# Patient Record
Sex: MALE | Race: WHITE | NOT HISPANIC OR LATINO | Employment: FULL TIME | URBAN - METROPOLITAN AREA
[De-identification: names, ages, dates, MRNs, and addresses within clinical notes are randomized per-mention and may not be internally consistent; named-entity substitution may affect disease eponyms.]

---

## 2023-04-24 DIAGNOSIS — E78.00 HYPERCHOLESTEROLEMIA: ICD-10-CM

## 2023-04-24 RX ORDER — METOPROLOL TARTRATE 75 MG/1
1 TABLET, FILM COATED ORAL 2 TIMES DAILY
COMMUNITY
Start: 2010-12-02 | End: 2023-10-10 | Stop reason: DRUGHIGH

## 2023-04-24 RX ORDER — ALBUTEROL SULFATE 90 UG/1
2 AEROSOL, METERED RESPIRATORY (INHALATION) EVERY 4 HOURS PRN
COMMUNITY
Start: 2022-09-02

## 2023-04-24 RX ORDER — MULTIVITAMIN
1 TABLET ORAL DAILY
COMMUNITY
Start: 2014-04-01

## 2023-04-24 RX ORDER — ATORVASTATIN CALCIUM 20 MG/1
20 TABLET, FILM COATED ORAL NIGHTLY
Qty: 90 TABLET | Refills: 0 | Status: SHIPPED | OUTPATIENT
Start: 2023-04-24 | End: 2023-07-26

## 2023-04-24 RX ORDER — ASPIRIN 81 MG/1
1 TABLET ORAL DAILY
COMMUNITY
Start: 2014-04-01

## 2023-04-24 RX ORDER — FAMOTIDINE 20 MG/1
1 TABLET, FILM COATED ORAL 2 TIMES DAILY
COMMUNITY
Start: 2018-06-18

## 2023-04-24 RX ORDER — LISINOPRIL 10 MG/1
1 TABLET ORAL DAILY
COMMUNITY
Start: 2010-12-02 | End: 2023-08-28

## 2023-04-24 RX ORDER — MINERAL OIL
1 ENEMA (ML) RECTAL DAILY
COMMUNITY

## 2023-04-24 RX ORDER — DULOXETIN HYDROCHLORIDE 30 MG/1
1 CAPSULE, DELAYED RELEASE ORAL DAILY
COMMUNITY
Start: 2023-03-07 | End: 2023-12-04 | Stop reason: SDUPTHER

## 2023-04-24 RX ORDER — GABAPENTIN 300 MG/1
2 CAPSULE ORAL 2 TIMES DAILY
COMMUNITY
Start: 2023-01-20 | End: 2023-11-07 | Stop reason: SDUPTHER

## 2023-04-24 RX ORDER — ATORVASTATIN CALCIUM 20 MG/1
1 TABLET, FILM COATED ORAL NIGHTLY
COMMUNITY
Start: 2015-11-23 | End: 2023-04-24 | Stop reason: SDUPTHER

## 2023-04-24 RX ORDER — DIPHENHYDRAMINE HCL 25 MG
1 TABLET ORAL NIGHTLY PRN
COMMUNITY

## 2023-05-17 ENCOUNTER — HOSPITAL ENCOUNTER (OUTPATIENT)
Dept: DATA CONVERSION | Facility: HOSPITAL | Age: 68
End: 2023-05-17
Attending: PHYSICAL MEDICINE & REHABILITATION | Admitting: PHYSICAL MEDICINE & REHABILITATION
Payer: COMMERCIAL

## 2023-05-17 DIAGNOSIS — M54.16 RADICULOPATHY, LUMBAR REGION: ICD-10-CM

## 2023-05-25 DIAGNOSIS — I10 ESSENTIAL (PRIMARY) HYPERTENSION: ICD-10-CM

## 2023-05-25 RX ORDER — METOPROLOL TARTRATE 50 MG/1
TABLET ORAL
Qty: 180 TABLET | Refills: 0 | Status: SHIPPED | OUTPATIENT
Start: 2023-05-25 | End: 2023-08-21

## 2023-05-29 DIAGNOSIS — E78.00 PURE HYPERCHOLESTEROLEMIA, UNSPECIFIED: ICD-10-CM

## 2023-05-29 DIAGNOSIS — E78.00 HYPERCHOLESTEROLEMIA: ICD-10-CM

## 2023-05-30 RX ORDER — ATORVASTATIN CALCIUM 40 MG/1
TABLET, FILM COATED ORAL
Qty: 90 TABLET | Refills: 1 | OUTPATIENT
Start: 2023-05-30

## 2023-05-30 RX ORDER — ATORVASTATIN CALCIUM 20 MG/1
20 TABLET, FILM COATED ORAL NIGHTLY
Qty: 90 TABLET | Refills: 0 | OUTPATIENT
Start: 2023-05-30

## 2023-06-12 DIAGNOSIS — M54.9 DORSALGIA, UNSPECIFIED: ICD-10-CM

## 2023-06-12 RX ORDER — METHOCARBAMOL 750 MG/1
TABLET, FILM COATED ORAL
Qty: 270 TABLET | Refills: 0 | Status: SHIPPED | OUTPATIENT
Start: 2023-06-12 | End: 2023-09-14

## 2023-07-05 DIAGNOSIS — M79.2 NEURALGIA AND NEURITIS, UNSPECIFIED: ICD-10-CM

## 2023-07-05 RX ORDER — GABAPENTIN 100 MG/1
CAPSULE ORAL
Qty: 30 CAPSULE | Refills: 0 | OUTPATIENT
Start: 2023-07-05

## 2023-07-26 DIAGNOSIS — E78.00 HYPERCHOLESTEROLEMIA: ICD-10-CM

## 2023-07-26 RX ORDER — ATORVASTATIN CALCIUM 20 MG/1
20 TABLET, FILM COATED ORAL NIGHTLY
Qty: 90 TABLET | Refills: 0 | Status: SHIPPED | OUTPATIENT
Start: 2023-07-26 | End: 2023-10-23

## 2023-08-20 DIAGNOSIS — I10 ESSENTIAL (PRIMARY) HYPERTENSION: ICD-10-CM

## 2023-08-21 RX ORDER — METOPROLOL TARTRATE 50 MG/1
TABLET ORAL
Qty: 180 TABLET | Refills: 0 | Status: SHIPPED | OUTPATIENT
Start: 2023-08-21 | End: 2023-10-10 | Stop reason: SINTOL

## 2023-08-26 DIAGNOSIS — I10 ESSENTIAL (PRIMARY) HYPERTENSION: ICD-10-CM

## 2023-08-28 RX ORDER — LISINOPRIL 10 MG/1
10 TABLET ORAL DAILY
Qty: 90 TABLET | Refills: 1 | Status: SHIPPED | OUTPATIENT
Start: 2023-08-28 | End: 2024-03-06 | Stop reason: SDUPTHER

## 2023-08-30 LAB
ALBUMIN (G/DL) IN SER/PLAS: 4.3 G/DL (ref 3.4–5)
ANION GAP IN SER/PLAS: 11 MMOL/L (ref 10–20)
APPEARANCE, URINE: NORMAL
BACTERIA, URINE: NORMAL /HPF
BASOPHILS (10*3/UL) IN BLOOD BY AUTOMATED COUNT: 0.05 X10E9/L (ref 0–0.1)
BASOPHILS/100 LEUKOCYTES IN BLOOD BY AUTOMATED COUNT: 0.6 % (ref 0–2)
BILIRUBIN, URINE: NEGATIVE
BLOOD, URINE: NEGATIVE
BUDDING YEAST, URINE: NORMAL /HPF
CALCIUM (MG/DL) IN SER/PLAS: 9.4 MG/DL (ref 8.6–10.3)
CARBON DIOXIDE, TOTAL (MMOL/L) IN SER/PLAS: 27 MMOL/L (ref 21–32)
CHLORIDE (MMOL/L) IN SER/PLAS: 100 MMOL/L (ref 98–107)
COLOR, URINE: YELLOW
CREATININE (MG/DL) IN SER/PLAS: 1.43 MG/DL (ref 0.5–1.3)
EOSINOPHILS (10*3/UL) IN BLOOD BY AUTOMATED COUNT: 0.28 X10E9/L (ref 0–0.7)
EOSINOPHILS/100 LEUKOCYTES IN BLOOD BY AUTOMATED COUNT: 3.4 % (ref 0–6)
EPITHELIAL CASTS, URINE: NORMAL /LPF
ERYTHROCYTE DISTRIBUTION WIDTH (RATIO) BY AUTOMATED COUNT: 13.4 % (ref 11.5–14.5)
ERYTHROCYTE MEAN CORPUSCULAR HEMOGLOBIN CONCENTRATION (G/DL) BY AUTOMATED: 32.2 G/DL (ref 32–36)
ERYTHROCYTE MEAN CORPUSCULAR VOLUME (FL) BY AUTOMATED COUNT: 90 FL (ref 80–100)
ERYTHROCYTES (10*6/UL) IN BLOOD BY AUTOMATED COUNT: 4.4 X10E12/L (ref 4.5–5.9)
FAT, URINE: NORMAL /HPF
GFR MALE: 54 ML/MIN/1.73M2
GLUCOSE (MG/DL) IN SER/PLAS: 97 MG/DL (ref 74–99)
GLUCOSE, URINE: NEGATIVE MG/DL
HEMATOCRIT (%) IN BLOOD BY AUTOMATED COUNT: 39.7 % (ref 41–52)
HEMOGLOBIN (G/DL) IN BLOOD: 12.8 G/DL (ref 13.5–17.5)
HYALINE CASTS, URINE: NORMAL /LPF
IMMATURE GRANULOCYTES/100 LEUKOCYTES IN BLOOD BY AUTOMATED COUNT: 0.5 % (ref 0–0.9)
KETONES, URINE: NEGATIVE MG/DL
LEUKOCYTE ESTERASE, URINE: NEGATIVE
LEUKOCYTES (10*3/UL) IN BLOOD BY AUTOMATED COUNT: 8.2 X10E9/L (ref 4.4–11.3)
LYMPHOCYTES (10*3/UL) IN BLOOD BY AUTOMATED COUNT: 2.03 X10E9/L (ref 1.2–4.8)
LYMPHOCYTES/100 LEUKOCYTES IN BLOOD BY AUTOMATED COUNT: 24.8 % (ref 13–44)
MONOCYTES (10*3/UL) IN BLOOD BY AUTOMATED COUNT: 0.87 X10E9/L (ref 0.1–1)
MONOCYTES/100 LEUKOCYTES IN BLOOD BY AUTOMATED COUNT: 10.6 % (ref 2–10)
MUCUS, URINE: NORMAL /LPF
NEUTROPHILS (10*3/UL) IN BLOOD BY AUTOMATED COUNT: 4.9 X10E9/L (ref 1.2–7.7)
NEUTROPHILS/100 LEUKOCYTES IN BLOOD BY AUTOMATED COUNT: 60.1 % (ref 40–80)
NITRITE, URINE: NEGATIVE
OVAL FAT BODIES, URINE: NORMAL /HPF
PARATHYRIN INTACT (PG/ML) IN SER/PLAS: 71.8 PG/ML (ref 18.5–88)
PH, URINE: 5 (ref 5–8)
PHOSPHATE (MG/DL) IN SER/PLAS: 3.5 MG/DL (ref 2.5–4.9)
PLATELETS (10*3/UL) IN BLOOD AUTOMATED COUNT: 276 X10E9/L (ref 150–450)
POTASSIUM (MMOL/L) IN SER/PLAS: 4.6 MMOL/L (ref 3.5–5.3)
PROTEIN, URINE: NEGATIVE MG/DL
RBC CASTS, URINE: NORMAL /LPF
RBC CLUMPS, URINE: NORMAL /HPF
RBC, URINE: NORMAL /HPF (ref 0–5)
RENAL EPITHELIAL CELLS, URINE: NEGATIVE /HPF
SODIUM (MMOL/L) IN SER/PLAS: 133 MMOL/L (ref 136–145)
SPECIFIC GRAVITY, URINE: 1.01 (ref 1–1.03)
SPERMATOZOA, URINE: NORMAL /HPF
TRANSITIONAL EPITHELIAL CELLS, URINE: NEGATIVE /HPF
TRICHOMONAS, URINE: NORMAL /HPF
UREA NITROGEN (MG/DL) IN SER/PLAS: 16 MG/DL (ref 6–23)
UROBILINOGEN, URINE: <2 MG/DL (ref 0–1.9)
WBC CASTS, URINE: NORMAL /LPF
WBC CLUMPS, URINE: NORMAL /HPF
WBC, URINE: NORMAL /HPF (ref 0–5)
YEAST HYPHAE, URINE: NORMAL /HPF

## 2023-08-31 LAB
ALBUMIN (MG/L) IN URINE: <7 MG/L
ALBUMIN/CREATININE (UG/MG) IN URINE: NORMAL UG/MG CRT (ref 0–30)
CREATININE (MG/DL) IN URINE: 78 MG/DL (ref 20–370)

## 2023-09-07 VITALS
HEART RATE: 55 BPM | DIASTOLIC BLOOD PRESSURE: 82 MMHG | SYSTOLIC BLOOD PRESSURE: 144 MMHG | RESPIRATION RATE: 16 BRPM | TEMPERATURE: 96.8 F

## 2023-09-14 DIAGNOSIS — M54.9 DORSALGIA, UNSPECIFIED: ICD-10-CM

## 2023-09-14 RX ORDER — METHOCARBAMOL 750 MG/1
TABLET, FILM COATED ORAL
Qty: 45 TABLET | Refills: 0 | Status: SHIPPED | OUTPATIENT
Start: 2023-09-14 | End: 2023-10-10 | Stop reason: ALTCHOICE

## 2023-09-30 NOTE — H&P
History of Present Illness:   History Present Illness:  Reason for surgery: back pain   HPI:    Mr. Jordan is a 66 yo M with PMH of Right lumbar neuritis who presents for follow up visit. He had an L5-S1 right sided interlaminar JENNIFER. States he only  had about 50% pain relief from the injection for about 10 days. He still has the low back pain that radiates into R LE accompanied by numbness and tingling. He started gabapentin since last visit however, he was titrating up to 600mg BID and only achieved  this dose as of yesterday so he is unsure if the medication is helping. He states the medication makes him sleepy but has not noticed any other side effects. He is scheduled for left shoulder replacement next week and will be unable to have full ROM for  approximately 6-8 weeks postoperatively. The patient denies fever, chills, night sweats, headache, weight loss, change in bowel/bladder function.     Allergies:        Intolerances:  ·  codeine : Insomnia, Mood Alteration  ·  Alcohol : Unknown    Home Medication Review:   Home Medications Reviewed: yes     Impression/Procedure:   ·  Impression and Planned Procedure: lumbar transforaminal epidural       ERAS (Enhanced Recovery After Surgery):  ·  ERAS Patient: no       Vital Signs:  Temperature C: 36 degrees C   Temperature F: 96.8 degrees F   Heart Rate: 55 beats per minute   Respiratory Rate: 16 breath per minute   Blood Pressure Systolic: 144 mm/Hg   Blood Pressure Diastolic: 82 mm/Hg     Physical Exam by System:    Constitutional: Well developed, awake/alert/oriented  x3, no distress, alert and cooperative   Eyes: PERRL, EOMI, clear sclera   ENMT: mucous membranes moist, no apparent injury,  no lesions seen   Head/Neck: Neck supple, no apparent injury, thyroid  without mass or tenderness, No JVD, trachea midline, no bruits   Respiratory/Thorax: Patent airways, CTAB, normal  breath sounds with good chest expansion, thorax symmetric   Cardiovascular: Regular,  rate and rhythm, no murmurs,  2+ equal pulses of the extremities, normal S 1and S 2   Gastrointestinal: Nondistended, soft, non-tender,  no rebound tenderness or guarding, no masses palpable, no organomegaly, +BS, no bruits   Extremities: normal extremities, no cyanosis edema,  contusions or wounds, no clubbing   Neurological: alert and oriented x3, intact senses,  motor, response and reflexes, normal strength   Skin: Warm and dry, no lesions, no rashes     Consent:   COVID-19 Consent:  ·  COVID-19 Risk Consent Surgeon has reviewed key risks related to the risk of gerardo COVID-19 and if they contract COVID-19 what the risks are.     Attestation:   Note Completion:  I am a:  Resident/Fellow   Attending Attestation I saw and evaluated the patient.  I personally obtained the key and critical portions of the history and physical exam or was physically present for key and  critical portions performed by the resident/fellow. I reviewed the resident/fellow?s documentation and discussed the patient with the resident/fellow.  I agree with the resident/fellow?s medical decision making as documented in the note.   I personally evaluated the patient on 17-May-2023         Electronic Signatures:  John Avila (Fellow))  (Signed 17-May-2023 11:55)   Authored: History of Present Illness, Allergies, Home  Medication Review, Impression/Procedure, ERAS, Physical Exam, Consent, Note Completion  Angel Perez)  (Signed 17-May-2023 12:02)   Authored: Note Completion   Co-Signer: History of Present Illness, Allergies, Home Medication Review, Impression/Procedure, ERAS, Physical Exam, Consent, Note Completion      Last Updated: 17-May-2023 12:02 by Angel Perez)

## 2023-10-02 NOTE — OP NOTE
Post Operative Note:     PreOp Diagnosis: Lumbar neuritis   Post-Procedure Diagnosis: Lumbar neuritis   Procedure: 1.  Right S1 transforaminal JENNIFER  2.  Moderate sedation  3.   4.   5.   Surgeon: Angel Perez MD   Resident/Fellow/Other Assistant: John Avila MD   Anesthesia: Local and 1 mg midazolam   Estimated Blood Loss (mL): none   Specimen: no   Complications: none apparent   Findings: Patient had a significant amount of disc  height loss and bony overgrowth at the L5-S1 foramen on the right and the decision was made to only do the S1 transforaminal     Operative Report Dictated:  Dictation: not applicable - note contains Operative  Report   Operative Report:    Patient had a significant amount of disc height loss and bony overgrowth at the L5-S1 foramen on the right and the decision was made to only do the S1 transforaminal    The patient was identified in the preoperative area and informed consent was obtained.  Patient was brought to the procedure room and placed in the prone position.  Using fluoroscopic guidance, the skin and subcutaneous tissue overlying needle trajectories  to the below neuroforamina were anesthetized with a total of 5 cc of Lidocaine.  22-gauge pencil point needles were then advanced under fluoroscopic guidance a combination of oblique, lateral and AP views to the epidural space at the inferior pedicle  in the proximity of the neuroforamina.  Needle positions were confirmed in AP and lateral views.  Blood and CSF was not aspirated.  Injection of iohexol contrast under live fluoroscopy revealed appropriate epidural spread without evidence of vascular  uptake.  Thereafter the below medication was  injected into each needle tip and the needles removed.  Patient was then transferred to the recovery room in stable condition and will update us on outpatient basis on the response to the procedure.    Level(s): S1  Laterality right    Medication(s): [10mg Dexamethasone] [3 mL 0.5%  lidocaine] divided between site(s)    Attestation:   Note Completion:  Attending Attestation I was present for the entire procedure         Electronic Signatures:  Angel Perez (MD)  (Signed 17-May-2023 12:38)   Authored: Post Operative Note, Note Completion      Last Updated: 17-May-2023 12:38 by Angel Perez)

## 2023-10-10 ENCOUNTER — OFFICE VISIT (OUTPATIENT)
Dept: PRIMARY CARE | Facility: CLINIC | Age: 68
End: 2023-10-10
Payer: COMMERCIAL

## 2023-10-10 VITALS
HEART RATE: 47 BPM | BODY MASS INDEX: 29 KG/M2 | WEIGHT: 184.8 LBS | RESPIRATION RATE: 18 BRPM | HEIGHT: 67 IN | OXYGEN SATURATION: 97 % | SYSTOLIC BLOOD PRESSURE: 140 MMHG | DIASTOLIC BLOOD PRESSURE: 79 MMHG

## 2023-10-10 DIAGNOSIS — J01.90 SUBACUTE SINUSITIS, UNSPECIFIED LOCATION: ICD-10-CM

## 2023-10-10 DIAGNOSIS — R00.1 BRADYCARDIA: ICD-10-CM

## 2023-10-10 DIAGNOSIS — I10 BENIGN HYPERTENSION: ICD-10-CM

## 2023-10-10 PROBLEM — J01.00 ACUTE MAXILLARY SINUSITIS: Status: RESOLVED | Noted: 2023-10-10 | Resolved: 2023-10-10

## 2023-10-10 PROBLEM — M43.10 ISTHMIC SPONDYLOLISTHESIS: Status: ACTIVE | Noted: 2023-10-10

## 2023-10-10 PROBLEM — K52.9 ACUTE GASTROENTERITIS: Status: RESOLVED | Noted: 2023-10-10 | Resolved: 2023-10-10

## 2023-10-10 PROBLEM — M47.816 SPONDYLOSIS OF LUMBAR SPINE: Status: ACTIVE | Noted: 2023-10-10

## 2023-10-10 PROBLEM — N18.9 CHRONIC KIDNEY DISEASE: Status: ACTIVE | Noted: 2023-10-10

## 2023-10-10 PROBLEM — J06.9 ACUTE URI: Status: RESOLVED | Noted: 2023-10-10 | Resolved: 2023-10-10

## 2023-10-10 PROBLEM — M79.2 NEUROPATHIC PAIN: Status: ACTIVE | Noted: 2023-10-10

## 2023-10-10 PROBLEM — M54.9 BACK PAIN: Status: ACTIVE | Noted: 2023-10-10

## 2023-10-10 PROBLEM — M19.019 PRIMARY LOCALIZED OSTEOARTHROSIS OF SHOULDER REGION: Status: ACTIVE | Noted: 2023-10-10

## 2023-10-10 PROBLEM — N18.31 STAGE 3A CHRONIC KIDNEY DISEASE (MULTI): Status: ACTIVE | Noted: 2023-10-10

## 2023-10-10 PROBLEM — R94.31 ABNORMAL EKG: Status: RESOLVED | Noted: 2023-10-10 | Resolved: 2023-10-10

## 2023-10-10 PROBLEM — E78.00 HYPERCHOLESTEROLEMIA: Status: ACTIVE | Noted: 2023-10-10

## 2023-10-10 PROBLEM — R20.0 NUMBNESS: Status: RESOLVED | Noted: 2023-10-10 | Resolved: 2023-10-10

## 2023-10-10 PROBLEM — E86.0 DEHYDRATION, MILD: Status: RESOLVED | Noted: 2023-10-10 | Resolved: 2023-10-10

## 2023-10-10 PROBLEM — R51.9 HEADACHE: Status: RESOLVED | Noted: 2023-10-10 | Resolved: 2023-10-10

## 2023-10-10 PROBLEM — R73.9 HYPERGLYCEMIA: Status: ACTIVE | Noted: 2023-10-10

## 2023-10-10 PROBLEM — M17.12 LEFT KNEE DJD: Status: RESOLVED | Noted: 2023-10-10 | Resolved: 2023-10-10

## 2023-10-10 PROBLEM — K21.9 GERD (GASTROESOPHAGEAL REFLUX DISEASE): Status: ACTIVE | Noted: 2023-10-10

## 2023-10-10 PROBLEM — F41.1 GAD (GENERALIZED ANXIETY DISORDER): Status: ACTIVE | Noted: 2023-10-10

## 2023-10-10 PROBLEM — M67.919 DISORDER OF ROTATOR CUFF: Status: RESOLVED | Noted: 2023-10-10 | Resolved: 2023-10-10

## 2023-10-10 PROBLEM — E66.9 OBESITY, CLASS I, BMI 30.0-34.9 (SEE ACTUAL BMI): Status: ACTIVE | Noted: 2023-10-10

## 2023-10-10 PROBLEM — U07.1 COVID-19: Status: RESOLVED | Noted: 2023-10-10 | Resolved: 2023-10-10

## 2023-10-10 PROBLEM — N39.0 UTI (URINARY TRACT INFECTION): Status: RESOLVED | Noted: 2023-10-10 | Resolved: 2023-10-10

## 2023-10-10 PROBLEM — F41.9 ANXIETY: Status: ACTIVE | Noted: 2023-10-10

## 2023-10-10 PROBLEM — D64.9 LOW HEMOGLOBIN: Status: ACTIVE | Noted: 2023-10-10

## 2023-10-10 PROBLEM — M10.9 ACUTE GOUT: Status: RESOLVED | Noted: 2023-10-10 | Resolved: 2023-10-10

## 2023-10-10 PROBLEM — R07.89 CHEST TIGHTNESS: Status: RESOLVED | Noted: 2023-10-10 | Resolved: 2023-10-10

## 2023-10-10 PROBLEM — E66.811 OBESITY, CLASS I, BMI 30.0-34.9 (SEE ACTUAL BMI): Status: ACTIVE | Noted: 2023-10-10

## 2023-10-10 PROBLEM — M79.601 PAIN OF RIGHT UPPER EXTREMITY: Status: RESOLVED | Noted: 2023-10-10 | Resolved: 2023-10-10

## 2023-10-10 PROBLEM — M70.30 BURSITIS OF ELBOW: Status: ACTIVE | Noted: 2023-10-10

## 2023-10-10 PROBLEM — R39.9 ABNORMAL FINDING OF KIDNEY: Status: RESOLVED | Noted: 2023-10-10 | Resolved: 2023-10-10

## 2023-10-10 PROBLEM — H66.91 OTITIS MEDIA, RIGHT: Status: RESOLVED | Noted: 2023-10-10 | Resolved: 2023-10-10

## 2023-10-10 PROBLEM — I12.9 HYPERTENSIVE KIDNEY DISEASE: Status: ACTIVE | Noted: 2023-10-10

## 2023-10-10 PROBLEM — M43.00 SPONDYLOLYSIS: Status: ACTIVE | Noted: 2023-10-10

## 2023-10-10 PROBLEM — R73.09 ABNORMAL GLUCOSE: Status: RESOLVED | Noted: 2023-10-10 | Resolved: 2023-10-10

## 2023-10-10 PROBLEM — M54.16 RIGHT LUMBAR RADICULOPATHY: Status: ACTIVE | Noted: 2023-10-10

## 2023-10-10 PROBLEM — M19.90 ARTHRITIS: Status: ACTIVE | Noted: 2023-10-10

## 2023-10-10 PROBLEM — J06.9 URI (UPPER RESPIRATORY INFECTION): Status: RESOLVED | Noted: 2023-10-10 | Resolved: 2023-10-10

## 2023-10-10 PROBLEM — J18.9 PNEUMONIA: Status: RESOLVED | Noted: 2023-10-10 | Resolved: 2023-10-10

## 2023-10-10 PROBLEM — N40.0 ENLARGED PROSTATE: Status: ACTIVE | Noted: 2023-10-10

## 2023-10-10 PROBLEM — R11.2 NAUSEA AND VOMITING: Status: RESOLVED | Noted: 2023-10-10 | Resolved: 2023-10-10

## 2023-10-10 PROBLEM — B35.3 ATHLETE'S FOOT: Status: RESOLVED | Noted: 2023-10-10 | Resolved: 2023-10-10

## 2023-10-10 PROBLEM — M17.10 ARTHRITIS OF KNEE: Status: ACTIVE | Noted: 2023-10-10

## 2023-10-10 PROBLEM — J20.9 ACUTE BRONCHITIS: Status: RESOLVED | Noted: 2023-10-10 | Resolved: 2023-10-10

## 2023-10-10 PROBLEM — K40.90 INGUINAL HERNIA: Status: RESOLVED | Noted: 2023-10-10 | Resolved: 2023-10-10

## 2023-10-10 PROCEDURE — 1036F TOBACCO NON-USER: CPT | Performed by: INTERNAL MEDICINE

## 2023-10-10 PROCEDURE — 1159F MED LIST DOCD IN RCRD: CPT | Performed by: INTERNAL MEDICINE

## 2023-10-10 PROCEDURE — 1160F RVW MEDS BY RX/DR IN RCRD: CPT | Performed by: INTERNAL MEDICINE

## 2023-10-10 PROCEDURE — 3078F DIAST BP <80 MM HG: CPT | Performed by: INTERNAL MEDICINE

## 2023-10-10 PROCEDURE — 3077F SYST BP >= 140 MM HG: CPT | Performed by: INTERNAL MEDICINE

## 2023-10-10 PROCEDURE — 1126F AMNT PAIN NOTED NONE PRSNT: CPT | Performed by: INTERNAL MEDICINE

## 2023-10-10 PROCEDURE — 99214 OFFICE O/P EST MOD 30 MIN: CPT | Performed by: INTERNAL MEDICINE

## 2023-10-10 RX ORDER — GUAIFENESIN 600 MG/1
600 TABLET, EXTENDED RELEASE ORAL 2 TIMES DAILY
Qty: 14 TABLET | Refills: 0 | Status: SHIPPED | OUTPATIENT
Start: 2023-10-10 | End: 2023-10-17

## 2023-10-10 RX ORDER — FLUTICASONE PROPIONATE 50 MCG
1 SPRAY, SUSPENSION (ML) NASAL 2 TIMES DAILY
Qty: 16 G | Refills: 0 | Status: SHIPPED | OUTPATIENT
Start: 2023-10-10 | End: 2023-11-02

## 2023-10-10 RX ORDER — METOPROLOL SUCCINATE 25 MG/1
25 TABLET, EXTENDED RELEASE ORAL DAILY
Qty: 90 TABLET | Refills: 1 | Status: SHIPPED | OUTPATIENT
Start: 2023-10-10 | End: 2024-04-02 | Stop reason: SDUPTHER

## 2023-10-10 RX ORDER — MULTIVITAMIN/IRON/FOLIC ACID 18MG-0.4MG
1 TABLET ORAL DAILY
COMMUNITY

## 2023-10-10 ASSESSMENT — ENCOUNTER SYMPTOMS: RHINORRHEA: 1

## 2023-10-10 ASSESSMENT — PATIENT HEALTH QUESTIONNAIRE - PHQ9
1. LITTLE INTEREST OR PLEASURE IN DOING THINGS: NOT AT ALL
2. FEELING DOWN, DEPRESSED OR HOPELESS: NOT AT ALL
SUM OF ALL RESPONSES TO PHQ9 QUESTIONS 1 AND 2: 0

## 2023-10-10 ASSESSMENT — PAIN SCALES - GENERAL: PAINLEVEL: 0-NO PAIN

## 2023-10-10 NOTE — PROGRESS NOTES
Patient ID:   Angel Jordan is a 68 y.o. male with PMH remarkable for HTN, HLD, CKD3 who presents to the office today for Ear Fullness (Popping when he blows his nose, sometimes sounds like fluid).    Will start on mucinex 600mg BID x7d  Will start on flonase 1 spray each nostril BID x5d  Let me know if the drainage changes or you develop a fever/chills. There is no s/sx of infection at this time.  Will refer to ENT regarding nasal polyp after his back surgery per his request.   Has back surgery coming up with Dr Bud gibbs (L5-S1 fusion) in Nov 2023.    Earache   There is pain in both ears. This is a recurrent problem. The current episode started 1 to 4 weeks ago. The problem has been waxing and waning. There has been no fever. Associated symptoms include rhinorrhea. He has tried nothing for the symptoms.     REVIEW OF SYSTEMS:  Review of Systems   HENT:  Positive for ear pain and rhinorrhea.    All other systems reviewed and are negative.    VITAL SIGNS:  Vitals:    10/10/23 0937   BP: 140/79   Pulse: (!) 47   Resp: 18   SpO2: 97%     ALLERGIES:  Allergies   Allergen Reactions    Alcohol Other    Codeine Other    Meperidine Headache      Physical Exam  Vitals reviewed.   Constitutional:       General: He is not in acute distress.     Appearance: Normal appearance. He is not ill-appearing.   HENT:      Head: Normocephalic and atraumatic.      Right Ear: External ear normal. A middle ear effusion is present.      Left Ear: Tympanic membrane and external ear normal.      Nose: Nose normal.      Comments: Polyp on right side     Mouth/Throat:      Mouth: Mucous membranes are moist.      Pharynx: Oropharynx is clear.   Eyes:      Conjunctiva/sclera: Conjunctivae normal.      Pupils: Pupils are equal, round, and reactive to light.   Cardiovascular:      Rate and Rhythm: Normal rate and regular rhythm.      Heart sounds: Normal heart sounds. No murmur heard.  Pulmonary:      Effort: Pulmonary effort is normal. No  respiratory distress.      Breath sounds: Normal breath sounds. No wheezing.   Abdominal:      General: There is no distension.      Palpations: Abdomen is soft. There is no mass.      Tenderness: There is no abdominal tenderness.   Musculoskeletal:         General: Normal range of motion.      Cervical back: Normal range of motion and neck supple.   Skin:     General: Skin is warm and dry.   Neurological:      General: No focal deficit present.      Mental Status: He is alert and oriented to person, place, and time.      Sensory: No sensory deficit.      Motor: No weakness.      Coordination: Coordination normal.      Gait: Gait normal.   Psychiatric:         Mood and Affect: Mood normal.         Behavior: Behavior normal.       MEDICATIONS:  Current Outpatient Medications on File Prior to Visit   Medication Sig Dispense Refill    albuterol 90 mcg/actuation inhaler Inhale 2 puffs every 4 hours if needed.      aspirin 81 mg EC tablet Take 1 tablet (81 mg) by mouth once daily.      atorvastatin (Lipitor) 20 mg tablet TAKE 1 TABLET (20 MG) BY MOUTH ONCE DAILY AT BEDTIME. 90 tablet 0    b complex 0.4 mg tablet Take 1 tablet by mouth once daily.      diphenhydrAMINE (Benadryl Allergy) 25 mg tablet Take by mouth.      DULoxetine (Cymbalta) 30 mg DR capsule Take 1 capsule (30 mg) by mouth once daily.      famotidine (Pepcid) 20 mg tablet Take 1 tablet (20 mg) by mouth 2 times a day.      fexofenadine (Allegra Allergy) 180 mg tablet Take 1 tablet (180 mg) by mouth once daily.      gabapentin (Neurontin) 300 mg capsule Take 2 capsules (600 mg) by mouth 2 times a day.      lisinopril 10 mg tablet TAKE 1 TABLET BY MOUTH EVERY DAY 90 tablet 1    multivitamin tablet Take 1 tablet by mouth once daily.      [DISCONTINUED] metoprolol tartrate (Lopressor) 50 mg tablet TAKE 1 TABLET BY MOUTH EVERY 12 HOURS 180 tablet 0    [DISCONTINUED] methocarbamol (Robaxin) 750 mg tablet TAKE 1 TABLET BY MOUTH THREE TIMES A DAY 45 tablet 0     [DISCONTINUED] metoprolol tartrate (Lopressor) 75 mg tablet Take 1 tablet (75 mg) by mouth in the morning and 1 tablet (75 mg) before bedtime.       No current facility-administered medications on file prior to visit.        Your medication list            Accurate as of October 10, 2023 11:59 PM. If you have any questions, ask your nurse or doctor.                START taking these medications        Instructions Last Dose Given Next Dose Due   fluticasone 50 mcg/actuation nasal spray  Commonly known as: Flonase  Started by: Criss Garcia MD      Administer 1 spray into each nostril 2 times a day for 7 days. Shake gently. Before first use, prime pump. After use, clean tip and replace cap.       guaiFENesin 600 mg 12 hr tablet  Commonly known as: Mucinex  Started by: Criss Garcia MD      Take 1 tablet (600 mg) by mouth 2 times a day for 7 days. Do not crush, chew, or split.       metoprolol succinate XL 25 mg 24 hr tablet  Commonly known as: Toprol-XL  Started by: Criss Garcia MD      Take 1 tablet (25 mg) by mouth once daily. Do not crush or chew.              CONTINUE taking these medications        Instructions Last Dose Given Next Dose Due   albuterol 90 mcg/actuation inhaler           Allegra Allergy 180 mg tablet  Generic drug: fexofenadine           aspirin 81 mg EC tablet           atorvastatin 20 mg tablet  Commonly known as: Lipitor      TAKE 1 TABLET (20 MG) BY MOUTH ONCE DAILY AT BEDTIME.       b complex 0.4 mg tablet           Benadryl Allergy 25 mg tablet  Generic drug: diphenhydrAMINE           DULoxetine 30 mg DR capsule  Commonly known as: Cymbalta           famotidine 20 mg tablet  Commonly known as: Pepcid           gabapentin 300 mg capsule  Commonly known as: Neurontin           lisinopril 10 mg tablet      TAKE 1 TABLET BY MOUTH EVERY DAY       multivitamin tablet                  STOP taking these medications      methocarbamol 750 mg tablet  Commonly known as: Robaxin  Stopped by:  Criss Garcia MD        metoprolol tartrate 50 mg tablet  Commonly known as: Lopressor  Stopped by: Criss Garcia MD        metoprolol tartrate 75 mg tablet  Commonly known as: Lopressor  Stopped by: Criss Garcia MD                  Where to Get Your Medications        These medications were sent to [id number] - [facility] [address] AT [facility]  [address], [facility] [address]      Phone: [phone]   fluticasone 50 mcg/actuation nasal spray  guaiFENesin 600 mg 12 hr tablet  metoprolol succinate XL 25 mg 24 hr tablet       RECENT LABS:  Lab Results   Component Value Date    WBC 8.2 08/30/2023    HGB 12.8 (L) 08/30/2023    HCT 39.7 (L) 08/30/2023     08/30/2023    CHOL 117 12/16/2022    TRIG 220 (H) 12/16/2022    HDL 32.0 (A) 12/16/2022    ALT 22 08/18/2022    AST 22 08/18/2022     (L) 08/30/2023    K 4.6 08/30/2023     08/30/2023    CREATININE 1.43 (H) 08/30/2023    BUN 16 08/30/2023    CO2 27 08/30/2023    TSH 1.15 11/09/2018    HGBA1C 5.3 05/15/2019     ASSESSMENT AND PLAN:  Assessment/Plan   Diagnoses and all orders for this visit:  Benign hypertension  -     metoprolol succinate XL (Toprol-XL) 25 mg 24 hr tablet; Take 1 tablet (25 mg) by mouth once daily. Do not crush or chew.  Subacute sinusitis, unspecified location  -     fluticasone (Flonase) 50 mcg/actuation nasal spray; Administer 1 spray into each nostril 2 times a day for 7 days. Shake gently. Before first use, prime pump. After use, clean tip and replace cap.  -     guaiFENesin (Mucinex) 600 mg 12 hr tablet; Take 1 tablet (600 mg) by mouth 2 times a day for 7 days. Do not crush, chew, or split.  Bradycardia  Comments:  - will decrease metoprolol XL to 25mg QD  - monitor for dizziness, lightheadedness    ------  Written by Loly De Oliveira RN, acting as a scribe for Dr. Bryant. This note accurately reflects the work and decisions made by Dr. Bryant.     I, Dr. Bryant, attest all  medical record entries made by the scribe were under my direction and were personally dictated by me. I have reviewed the chart and agree that the record accurately reflects my performance of the history, physical exam, and assessment and plan.

## 2023-10-18 PROBLEM — M47.816 LUMBAR SPONDYLOSIS: Status: ACTIVE | Noted: 2023-10-18

## 2023-10-18 PROBLEM — Z87.39 PERSONAL HISTORY OF ARTHRITIS: Status: ACTIVE | Noted: 2023-10-18

## 2023-10-18 PROBLEM — E87.1 HYPONATREMIA: Status: ACTIVE | Noted: 2023-10-18

## 2023-10-18 PROBLEM — K21.9 GASTROESOPHAGEAL REFLUX DISEASE WITHOUT ESOPHAGITIS: Status: ACTIVE | Noted: 2022-01-25

## 2023-10-18 PROBLEM — Z96.659 HISTORY OF ARTHROPLASTY OF KNEE: Status: ACTIVE | Noted: 2023-10-18

## 2023-10-18 PROBLEM — G47.30 SLEEP APNEA: Status: ACTIVE | Noted: 2022-01-25

## 2023-10-18 PROBLEM — M10.9 GOUT: Status: ACTIVE | Noted: 2023-10-18

## 2023-10-18 PROBLEM — M47.816 DJD (DEGENERATIVE JOINT DISEASE), LUMBAR: Status: ACTIVE | Noted: 2023-10-18

## 2023-10-18 PROBLEM — Z96.653 ARTIFICIAL KNEE JOINT PRESENT, BILATERAL: Status: ACTIVE | Noted: 2022-01-25

## 2023-10-18 PROBLEM — E78.5 HYPERLIPIDEMIA, UNSPECIFIED: Status: ACTIVE | Noted: 2022-01-25

## 2023-10-18 PROBLEM — Z96.651 HISTORY OF TOTAL RIGHT KNEE REPLACEMENT: Status: ACTIVE | Noted: 2023-10-18

## 2023-10-18 RX ORDER — ATENOLOL 100 MG/1
100 TABLET ORAL DAILY
COMMUNITY
Start: 2003-06-26 | End: 2023-10-19 | Stop reason: WASHOUT

## 2023-10-18 RX ORDER — DISULFIRAM 250 MG/1
250 TABLET ORAL DAILY
COMMUNITY
Start: 2003-06-26 | End: 2023-10-19 | Stop reason: WASHOUT

## 2023-10-18 RX ORDER — TRIAMCINOLONE ACETONIDE 55 UG/1
2 SPRAY, METERED NASAL
COMMUNITY
Start: 2003-06-26 | End: 2023-10-19 | Stop reason: WASHOUT

## 2023-10-18 RX ORDER — METOPROLOL TARTRATE 75 MG/1
75 TABLET, FILM COATED ORAL 2 TIMES DAILY
COMMUNITY
End: 2023-10-19 | Stop reason: WASHOUT

## 2023-10-19 ENCOUNTER — APPOINTMENT (OUTPATIENT)
Dept: PREADMISSION TESTING | Facility: HOSPITAL | Age: 68
End: 2023-10-19
Payer: COMMERCIAL

## 2023-10-22 DIAGNOSIS — E78.00 HYPERCHOLESTEROLEMIA: ICD-10-CM

## 2023-10-23 RX ORDER — ATORVASTATIN CALCIUM 20 MG/1
20 TABLET, FILM COATED ORAL NIGHTLY
Qty: 90 TABLET | Refills: 0 | Status: SHIPPED | OUTPATIENT
Start: 2023-10-23 | End: 2024-01-19

## 2023-10-26 ENCOUNTER — PRE-ADMISSION TESTING (OUTPATIENT)
Dept: PREADMISSION TESTING | Facility: HOSPITAL | Age: 68
End: 2023-10-26
Payer: COMMERCIAL

## 2023-10-26 ENCOUNTER — LAB (OUTPATIENT)
Dept: LAB | Facility: LAB | Age: 68
End: 2023-10-26
Payer: COMMERCIAL

## 2023-10-26 VITALS
BODY MASS INDEX: 27.54 KG/M2 | DIASTOLIC BLOOD PRESSURE: 84 MMHG | HEIGHT: 67 IN | HEART RATE: 61 BPM | RESPIRATION RATE: 18 BRPM | WEIGHT: 175.49 LBS | TEMPERATURE: 97.3 F | OXYGEN SATURATION: 100 % | SYSTOLIC BLOOD PRESSURE: 142 MMHG

## 2023-10-26 DIAGNOSIS — R73.9 HYPERGLYCEMIA: ICD-10-CM

## 2023-10-26 DIAGNOSIS — I10 BENIGN HYPERTENSION: Primary | ICD-10-CM

## 2023-10-26 DIAGNOSIS — D64.9 LOW HEMOGLOBIN: ICD-10-CM

## 2023-10-26 DIAGNOSIS — Z01.818 PREPROCEDURAL EXAMINATION: ICD-10-CM

## 2023-10-26 LAB
ABO GROUP (TYPE) IN BLOOD: NORMAL
ALBUMIN SERPL BCP-MCNC: 4.3 G/DL (ref 3.4–5)
ALP SERPL-CCNC: 69 U/L (ref 33–136)
ALT SERPL W P-5'-P-CCNC: 14 U/L (ref 10–52)
ANION GAP SERPL CALC-SCNC: 11 MMOL/L (ref 10–20)
ANTIBODY SCREEN: NORMAL
AST SERPL W P-5'-P-CCNC: 18 U/L (ref 9–39)
BASOPHILS # BLD AUTO: 0.05 X10*3/UL (ref 0–0.1)
BASOPHILS NFR BLD AUTO: 0.6 %
BILIRUB SERPL-MCNC: 0.4 MG/DL (ref 0–1.2)
BUN SERPL-MCNC: 17 MG/DL (ref 6–23)
CALCIUM SERPL-MCNC: 9.2 MG/DL (ref 8.6–10.3)
CHLORIDE SERPL-SCNC: 103 MMOL/L (ref 98–107)
CO2 SERPL-SCNC: 27 MMOL/L (ref 21–32)
CREAT SERPL-MCNC: 1.37 MG/DL (ref 0.5–1.3)
EOSINOPHIL # BLD AUTO: 0.24 X10*3/UL (ref 0–0.7)
EOSINOPHIL NFR BLD AUTO: 3.1 %
ERYTHROCYTE [DISTWIDTH] IN BLOOD BY AUTOMATED COUNT: 13.6 % (ref 11.5–14.5)
GFR SERPL CREATININE-BSD FRML MDRD: 57 ML/MIN/1.73M*2
GLUCOSE SERPL-MCNC: 87 MG/DL (ref 74–99)
HCT VFR BLD AUTO: 37.5 % (ref 41–52)
HGB BLD-MCNC: 12.3 G/DL (ref 13.5–17.5)
IMM GRANULOCYTES # BLD AUTO: 0.03 X10*3/UL (ref 0–0.7)
IMM GRANULOCYTES NFR BLD AUTO: 0.4 % (ref 0–0.9)
INR PPP: 1 (ref 0.9–1.1)
LYMPHOCYTES # BLD AUTO: 1.52 X10*3/UL (ref 1.2–4.8)
LYMPHOCYTES NFR BLD AUTO: 19.4 %
MCH RBC QN AUTO: 28.9 PG (ref 26–34)
MCHC RBC AUTO-ENTMCNC: 32.8 G/DL (ref 32–36)
MCV RBC AUTO: 88 FL (ref 80–100)
MONOCYTES # BLD AUTO: 0.82 X10*3/UL (ref 0.1–1)
MONOCYTES NFR BLD AUTO: 10.5 %
NEUTROPHILS # BLD AUTO: 5.17 X10*3/UL (ref 1.2–7.7)
NEUTROPHILS NFR BLD AUTO: 66 %
NRBC BLD-RTO: 0 /100 WBCS (ref 0–0)
PLATELET # BLD AUTO: 219 X10*3/UL (ref 150–450)
PMV BLD AUTO: 8.4 FL (ref 7.5–11.5)
POTASSIUM SERPL-SCNC: 4.4 MMOL/L (ref 3.5–5.3)
PROT SERPL-MCNC: 6.4 G/DL (ref 6.4–8.2)
PROTHROMBIN TIME: 11.1 SECONDS (ref 9.8–12.8)
RBC # BLD AUTO: 4.26 X10*6/UL (ref 4.5–5.9)
RH FACTOR (ANTIGEN D): NORMAL
SODIUM SERPL-SCNC: 137 MMOL/L (ref 136–145)
WBC # BLD AUTO: 7.8 X10*3/UL (ref 4.4–11.3)

## 2023-10-26 PROCEDURE — 99204 OFFICE O/P NEW MOD 45 MIN: CPT | Performed by: NURSE PRACTITIONER

## 2023-10-26 PROCEDURE — 80053 COMPREHEN METABOLIC PANEL: CPT

## 2023-10-26 PROCEDURE — 85610 PROTHROMBIN TIME: CPT

## 2023-10-26 PROCEDURE — 36415 COLL VENOUS BLD VENIPUNCTURE: CPT

## 2023-10-26 PROCEDURE — 86850 RBC ANTIBODY SCREEN: CPT

## 2023-10-26 PROCEDURE — 87081 CULTURE SCREEN ONLY: CPT | Mod: AHULAB | Performed by: NURSE PRACTITIONER

## 2023-10-26 PROCEDURE — 86901 BLOOD TYPING SEROLOGIC RH(D): CPT

## 2023-10-26 PROCEDURE — 86900 BLOOD TYPING SEROLOGIC ABO: CPT

## 2023-10-26 PROCEDURE — 85025 COMPLETE CBC W/AUTO DIFF WBC: CPT

## 2023-10-26 RX ORDER — CHLORHEXIDINE GLUCONATE ORAL RINSE 1.2 MG/ML
15 SOLUTION DENTAL DAILY
Qty: 30 ML | Refills: 0 | Status: SHIPPED | OUTPATIENT
Start: 2023-11-01 | End: 2023-11-03 | Stop reason: HOSPADM

## 2023-10-26 ASSESSMENT — ENCOUNTER SYMPTOMS: ARTHRALGIAS: 1

## 2023-10-26 NOTE — CPM/PAT H&P
CPM/PAT Evaluation       Name: Angel Jordan (Angel Jordan)  /Age: 1955/68 y.o.     SURGEON :DR MISHEL FARRELL  Surgery, Date, and Length:  L5-S1 Anterior Lumbar Fusion; Posterior Instrumentation , 23, 3 hrs   HPI:  This a 68y.o. male who presents for presurgical evaluation for  L5-S1 Anterior Lumbar Fusion; Posterior Instrumentation . Pt reports foot drop and a history of lumbar stenosis .After discussion of the risks and benefits with Dr. Farrell the patient elects to proceed with the planned procedure.       Past Medical History:   Diagnosis Date    CKD (chronic kidney disease)     23: creatinine 1.43, GFR 54    Difficult intravenous access     requiring use of ultrasound prior to surgery to obtain IV access    GERD (gastroesophageal reflux disease)     Gout     Hyperlipidemia     Hypertension     Hyponatremia     23: Na 133    Nasal polyp     Skin cancer     s/p excision       Past Surgical History:   Procedure Laterality Date    CARPAL TUNNEL RELEASE Right 2016    COLONOSCOPY  2011    HERNIA REPAIR Right 2014    inguinal    KNEE SURGERY Left 2010    Knee Surgery-MENISCUS    LASIK Bilateral         ROTATOR CUFF REPAIR Left     Rotator Cuff Repair    TONSILLECTOMY      TOTAL KNEE ARTHROPLASTY Left     2017    TOTAL KNEE ARTHROPLASTY Right     TOTAL SHOULDER ARTHROPLASTY Left 2023     Anesthesia History  Pt denies any past history of anesthetic complications such as PONV, awareness, prolonged sedation, dental damage, aspiration, cardiac arrest, difficult intubation, difficult I.V. access or unexpected hospital admissions.  NO malignant hyperthermia and or pseudo cholinesterase deficiency.    The patient is not  a Druze and will accept blood and blood products if medically indicated.   No history of blood transfusions .Type and screen not sent.       Family History   Problem Relation Name Age of Onset    Coronary artery disease Mother      Cancer Mother       Stroke Mother      Hypertension Mother      Coronary artery disease Father      Cancer Brother      Other (coagulation defects) Other family history     Hearing loss Other family history        Allergies   Allergen Reactions    Codeine Other    Meperidine Headache       Prior to Admission medications    Medication Sig Start Date End Date Taking? Authorizing Provider   albuterol 90 mcg/actuation inhaler Inhale 2 puffs every 4 hours if needed. 9/2/22   Historical Provider, MD   aspirin 81 mg EC tablet Take 1 tablet (81 mg) by mouth once daily. 4/1/14   Historical Provider, MD   atorvastatin (Lipitor) 20 mg tablet TAKE 1 TABLET (20 MG) BY MOUTH ONCE DAILY AT BEDTIME. 10/23/23   Criss Garcia MD   b complex 0.4 mg tablet Take 1 tablet by mouth once daily.    Historical Provider, MD   diphenhydrAMINE (Benadryl Allergy) 25 mg tablet Take 1 tablet (25 mg) by mouth as needed at bedtime.    Historical Provider, MD   DULoxetine (Cymbalta) 30 mg DR capsule Take 1 capsule (30 mg) by mouth once daily. 3/7/23   Historical Provider, MD   famotidine (Pepcid) 20 mg tablet Take 1 tablet (20 mg) by mouth 2 times a day. 6/18/18   Historical Provider, MD   fexofenadine (Allegra Allergy) 180 mg tablet Take 1 tablet (180 mg) by mouth once daily.    Historical Provider, MD   fluticasone (Flonase) 50 mcg/actuation nasal spray Administer 1 spray into each nostril 2 times a day for 7 days. Shake gently. Before first use, prime pump. After use, clean tip and replace cap.  Patient taking differently: Administer 1 spray into each nostril once daily as needed. Shake gently. Before first use, prime pump. After use, clean tip and replace cap. 10/10/23 10/17/23  Criss Garcia MD   gabapentin (Neurontin) 300 mg capsule Take 2 capsules (600 mg) by mouth 2 times a day. 1/20/23   Historical Provider, MD   lisinopril 10 mg tablet TAKE 1 TABLET BY MOUTH EVERY DAY 8/28/23   Vasiliy Morris PA-C   metoprolol succinate XL (Toprol-XL) 25 mg 24 hr  "tablet Take 1 tablet (25 mg) by mouth once daily. Do not crush or chew. 10/10/23   Criss Garcia MD   multivitamin tablet Take 1 tablet by mouth once daily. 4/1/14   Historical Provider, MD   atorvastatin (Lipitor) 20 mg tablet TAKE 1 TABLET (20 MG) BY MOUTH ONCE DAILY AT BEDTIME. 7/26/23 10/23/23  Vasiliy Morris PA-C        PAT ROS:   Constitutional:   Neuro/Psych:   Eyes:   Ears:   Nose:   Mouth:   Throat:   Neck:   Cardio:   Respiratory:   Endocrine:   GI:   :   Musculoskeletal:    arthralgias  Hematologic:   Skin:      Physical Exam  Vitals reviewed.   Constitutional:       Appearance: Normal appearance.   HENT:      Head: Normocephalic and atraumatic.      Mouth/Throat:      Mouth: Mucous membranes are moist.   Eyes:      Extraocular Movements: Extraocular movements intact.      Pupils: Pupils are equal, round, and reactive to light.   Cardiovascular:      Rate and Rhythm: Normal rate and regular rhythm.      Pulses: Normal pulses.      Heart sounds: Normal heart sounds.   Pulmonary:      Effort: Pulmonary effort is normal.      Breath sounds: Normal breath sounds.   Musculoskeletal:         General: Normal range of motion.      Cervical back: Normal range of motion.   Skin:     General: Skin is warm and dry.   Neurological:      Mental Status: He is alert and oriented to person, place, and time.   Psychiatric:         Mood and Affect: Mood normal.         Behavior: Behavior normal.          PAT AIRWAY:   Airway:     Mallampati::  II   Denies loose teeth    /84   Pulse 61   Temp 36.3 °C (97.3 °F)   Resp 18   Ht 1.702 m (5' 7\")   Wt 79.6 kg (175 lb 7.8 oz)   SpO2 100%   BMI 27.49 kg/m²      EKG SCANNED  Lab Results   Component Value Date    WBC 7.8 10/26/2023    HGB 12.3 (L) 10/26/2023    HCT 37.5 (L) 10/26/2023    MCV 88 10/26/2023     10/26/2023     Results from last 7 days   Lab Units 10/26/23  0833   SODIUM mmol/L 137   POTASSIUM mmol/L 4.4   CHLORIDE mmol/L 103   CO2 mmol/L 27   BUN " mg/dL 17   CREATININE mg/dL 1.37*   GLUCOSE mg/dL 87   CALCIUM mg/dL 9.2       ASSESSMENT/PLAN    Patient is a year-old  scheduled for L5-S1 Anterior Lumbar Fusion; Posterior Instrumentation  with Dr. Farrell   on  11/2/23 .  CARDIOVASCULAR:  RCRI score / Risk: The patients score is 0 based on history . Per ACC/AHA guidelines this places him  at  3.9% risk for MACE undergoing a intermediate  risk procedure . The patient has the following risk factors:  Functional Capacity: The patients exercise tolerance is  4  METS. This is based on the patients limited due to back pain . Patient denies  active cardiac symptoms or anginal equivalents .      PULMONARY:  The patient has the following factors that place them at increased risk of perioperative pulmonary complications;age greater than 65/BMI greater than 27/greater than 2.5 hour procedure.  Postoperatively the patient would benefit from early pulmonary toilet/incentive spirometry q 1-2 hours while awake/pulse oximetry/cautious use of respiratory depressant medications such as opioids/elevate the HOB/oral hygiene.    CKD:  The patient has had chronic kidney disease f is currently at uorla6H,with creatinine levels that (have been stable/have been rising) for the last*years/months.    Recommendations: Avoid intraoperative hypotension. Avoid nephrotoxic drugs and radicontrast dyes.  Recheck BMP periodically in the post op period.   DVT:  CAPRINI SCORE=9  The patient has the following factors that increase his  Risk for thrombus formation ; Virchow's triad , age >65, bmi>25, major spine sx , Surgical procedure >2 hrs  procedure .    Recommendations: DVT prophylaxis  per Dr. Farrell  protocol . SCD's, GOMEZ's, and early ambulation are recommended. Heparin or LMWH is recommended for the very high risk .      Risk assessment complete.  Patient is scheduled for  intermediate surgical risk procedure.  Patient is considered an acceptable  risk to proceed with the planned  procedure.      Preoperative medication instructions were provided and reviewed with the patient.  Any additional testing or evaluation was explained to the patient.  Nothing by mouth instructions were discussed and patient's questions were answered prior to conclusion to this encounter.  Patient verbalized understanding of preoperative instructions given in preadmission testing; discharge instructions available in EMR.

## 2023-10-26 NOTE — H&P (VIEW-ONLY)
CPM/PAT Evaluation       Name: Angel Jordan (Angel Jordan)  /Age: 1955/68 y.o.     SURGEON :DR MISHEL FARRELL  Surgery, Date, and Length:  L5-S1 Anterior Lumbar Fusion; Posterior Instrumentation , 23, 3 hrs   HPI:  This a 68y.o. male who presents for presurgical evaluation for  L5-S1 Anterior Lumbar Fusion; Posterior Instrumentation . Pt reports foot drop and a history of lumbar stenosis .After discussion of the risks and benefits with Dr. Farrell the patient elects to proceed with the planned procedure.       Past Medical History:   Diagnosis Date    CKD (chronic kidney disease)     23: creatinine 1.43, GFR 54    Difficult intravenous access     requiring use of ultrasound prior to surgery to obtain IV access    GERD (gastroesophageal reflux disease)     Gout     Hyperlipidemia     Hypertension     Hyponatremia     23: Na 133    Nasal polyp     Skin cancer     s/p excision       Past Surgical History:   Procedure Laterality Date    CARPAL TUNNEL RELEASE Right 2016    COLONOSCOPY  2011    HERNIA REPAIR Right 2014    inguinal    KNEE SURGERY Left 2010    Knee Surgery-MENISCUS    LASIK Bilateral         ROTATOR CUFF REPAIR Left     Rotator Cuff Repair    TONSILLECTOMY      TOTAL KNEE ARTHROPLASTY Left     2017    TOTAL KNEE ARTHROPLASTY Right     TOTAL SHOULDER ARTHROPLASTY Left 2023     Anesthesia History  Pt denies any past history of anesthetic complications such as PONV, awareness, prolonged sedation, dental damage, aspiration, cardiac arrest, difficult intubation, difficult I.V. access or unexpected hospital admissions.  NO malignant hyperthermia and or pseudo cholinesterase deficiency.    The patient is not  a [employer] and will accept blood and blood products if medically indicated.   No history of blood transfusions .Type and screen not sent.       Family History   Problem Relation Name Age of Onset    Coronary artery disease Mother      Cancer Mother       Stroke Mother      Hypertension Mother      Coronary artery disease Father      Cancer Brother      Other (coagulation defects) Other family history     Hearing loss Other family history        Allergies   Allergen Reactions    Codeine Other    Meperidine Headache       Prior to Admission medications    Medication Sig Start Date End Date Taking? Authorizing Provider   albuterol 90 mcg/actuation inhaler Inhale 2 puffs every 4 hours if needed. 9/2/22   Historical Provider, MD   aspirin 81 mg EC tablet Take 1 tablet (81 mg) by mouth once daily. 4/1/14   Historical Provider, MD   atorvastatin (Lipitor) 20 mg tablet TAKE 1 TABLET (20 MG) BY MOUTH ONCE DAILY AT BEDTIME. 10/23/23   Criss Garcia MD   b complex 0.4 mg tablet Take 1 tablet by mouth once daily.    Historical Provider, MD   diphenhydrAMINE (Benadryl Allergy) 25 mg tablet Take 1 tablet (25 mg) by mouth as needed at bedtime.    Historical Provider, MD   DULoxetine (Cymbalta) 30 mg DR capsule Take 1 capsule (30 mg) by mouth once daily. 3/7/23   Historical Provider, MD   famotidine (Pepcid) 20 mg tablet Take 1 tablet (20 mg) by mouth 2 times a day. 6/18/18   Historical Provider, MD   fexofenadine (Allegra Allergy) 180 mg tablet Take 1 tablet (180 mg) by mouth once daily.    Historical Provider, MD   fluticasone (Flonase) 50 mcg/actuation nasal spray Administer 1 spray into each nostril 2 times a day for 7 days. Shake gently. Before first use, prime pump. After use, clean tip and replace cap.  Patient taking differently: Administer 1 spray into each nostril once daily as needed. Shake gently. Before first use, prime pump. After use, clean tip and replace cap. 10/10/23 10/17/23  Criss Garcia MD   gabapentin (Neurontin) 300 mg capsule Take 2 capsules (600 mg) by mouth 2 times a day. 1/20/23   Historical Provider, MD   lisinopril 10 mg tablet TAKE 1 TABLET BY MOUTH EVERY DAY 8/28/23   Vasiliy Morris PA-C   metoprolol succinate XL (Toprol-XL) 25 mg 24 hr  "tablet Take 1 tablet (25 mg) by mouth once daily. Do not crush or chew. 10/10/23   Criss Garcia MD   multivitamin tablet Take 1 tablet by mouth once daily. 4/1/14   Historical Provider, MD   atorvastatin (Lipitor) 20 mg tablet TAKE 1 TABLET (20 MG) BY MOUTH ONCE DAILY AT BEDTIME. 7/26/23 10/23/23  Vasiliy Morris PA-C        PAT ROS:   Constitutional:   Neuro/Psych:   Eyes:   Ears:   Nose:   Mouth:   Throat:   Neck:   Cardio:   Respiratory:   Endocrine:   GI:   :   Musculoskeletal:    arthralgias  Hematologic:   Skin:      Physical Exam  Vitals reviewed.   Constitutional:       Appearance: Normal appearance.   HENT:      Head: Normocephalic and atraumatic.      Mouth/Throat:      Mouth: Mucous membranes are moist.   Eyes:      Extraocular Movements: Extraocular movements intact.      Pupils: Pupils are equal, round, and reactive to light.   Cardiovascular:      Rate and Rhythm: Normal rate and regular rhythm.      Pulses: Normal pulses.      Heart sounds: Normal heart sounds.   Pulmonary:      Effort: Pulmonary effort is normal.      Breath sounds: Normal breath sounds.   Musculoskeletal:         General: Normal range of motion.      Cervical back: Normal range of motion.   Skin:     General: Skin is warm and dry.   Neurological:      Mental Status: He is alert and oriented to person, place, and time.   Psychiatric:         Mood and Affect: Mood normal.         Behavior: Behavior normal.          PAT AIRWAY:   Airway:     Mallampati::  II   Denies loose teeth    /84   Pulse 61   Temp 36.3 °C (97.3 °F)   Resp 18   Ht 1.702 m (5' 7\")   Wt 79.6 kg (175 lb 7.8 oz)   SpO2 100%   BMI 27.49 kg/m²      EKG SCANNED  Lab Results   Component Value Date    WBC 7.8 10/26/2023    HGB 12.3 (L) 10/26/2023    HCT 37.5 (L) 10/26/2023    MCV 88 10/26/2023     10/26/2023     Results from last 7 days   Lab Units 10/26/23  0833   SODIUM mmol/L 137   POTASSIUM mmol/L 4.4   CHLORIDE mmol/L 103   CO2 mmol/L 27   BUN " mg/dL 17   CREATININE mg/dL 1.37*   GLUCOSE mg/dL 87   CALCIUM mg/dL 9.2       ASSESSMENT/PLAN    Patient is a year-old  scheduled for L5-S1 Anterior Lumbar Fusion; Posterior Instrumentation  with Dr. Farrell   on  11/2/23 .  CARDIOVASCULAR:  RCRI score / Risk: The patients score is 0 based on history . Per ACC/AHA guidelines this places him  at  3.9% risk for MACE undergoing a intermediate  risk procedure . The patient has the following risk factors:  Functional Capacity: The patients exercise tolerance is  4  METS. This is based on the patients limited due to back pain . Patient denies  active cardiac symptoms or anginal equivalents .      PULMONARY:  The patient has the following factors that place them at increased risk of perioperative pulmonary complications;age greater than 65/BMI greater than 27/greater than 2.5 hour procedure.  Postoperatively the patient would benefit from early pulmonary toilet/incentive spirometry q 1-2 hours while awake/pulse oximetry/cautious use of respiratory depressant medications such as opioids/elevate the HOB/oral hygiene.    CKD:  The patient has had chronic kidney disease f is currently at [id number],with creatinine levels that (have been stable/have been rising) for the last*years/months.    Recommendations: Avoid intraoperative hypotension. Avoid nephrotoxic drugs and radicontrast dyes.  Recheck BMP periodically in the post op period.   DVT:  CAPRINI SCORE=9  The patient has the following factors that increase his  Risk for thrombus formation ; Virchow's triad , age >65, bmi>25, major spine sx , Surgical procedure >2 hrs  procedure .    Recommendations: DVT prophylaxis  per Dr. Farrell  protocol . SCD's, GOMEZ's, and early ambulation are recommended. Heparin or LMWH is recommended for the very high risk .      Risk assessment complete.  Patient is scheduled for  intermediate surgical risk procedure.  Patient is considered an acceptable  risk to proceed with the planned  procedure.      Preoperative medication instructions were provided and reviewed with the patient.  Any additional testing or evaluation was explained to the patient.  Nothing by mouth instructions were discussed and patient's questions were answered prior to conclusion to this encounter.  Patient verbalized understanding of preoperative instructions given in preadmission testing; discharge instructions available in EMR.

## 2023-10-26 NOTE — PREPROCEDURE INSTRUCTIONS
Medication List            Accurate as of October 26, 2023  7:45 AM. Always use your most recent med list.                albuterol 90 mcg/actuation inhaler  Medication Adjustments for Surgery: Take morning of surgery with sip of water, no other fluids     Allegra Allergy 180 mg tablet  Generic drug: fexofenadine  Medication Adjustments for Surgery: Take morning of surgery with sip of water, no other fluids     aspirin 81 mg EC tablet  Medication Adjustments for Surgery: Take morning of surgery with sip of water, no other fluids     atorvastatin 20 mg tablet  Commonly known as: Lipitor  TAKE 1 TABLET (20 MG) BY MOUTH ONCE DAILY AT BEDTIME.  Medication Adjustments for Surgery: Take morning of surgery with sip of water, no other fluids     b complex 0.4 mg tablet  Medication Adjustments for Surgery: Stop 7 days before surgery     Benadryl Allergy 25 mg tablet  Generic drug: diphenhydrAMINE  Medication Adjustments for Surgery: Stop 1 day before surgery     chlorhexidine 0.12 % solution  Commonly known as: Peridex  Use 15 mL in the mouth or throat once daily for 2 days. 15 ml swish and spit pm of night before  surgery .15 ml swish and spit am of surgery . Do not start before November 1, 2023.  Start taking on: November 1, 2023  Notes to patient: Use as directed , discard remainder after use .     DULoxetine 30 mg DR capsule  Commonly known as: Cymbalta  Medication Adjustments for Surgery: Take morning of surgery with sip of water, no other fluids     famotidine 20 mg tablet  Commonly known as: Pepcid  Medication Adjustments for Surgery: Take morning of surgery with sip of water, no other fluids     fluticasone 50 mcg/actuation nasal spray  Commonly known as: Flonase  Administer 1 spray into each nostril 2 times a day for 7 days. Shake gently. Before first use, prime pump. After use, clean tip and replace cap.  Medication Adjustments for Surgery: Take morning of surgery with sip of water, no other fluids      gabapentin 300 mg capsule  Commonly known as: Neurontin  Medication Adjustments for Surgery: Take morning of surgery with sip of water, no other fluids     lisinopril 10 mg tablet  TAKE 1 TABLET BY MOUTH EVERY DAY  Medication Adjustments for Surgery: Continue until night before surgery     metoprolol succinate XL 25 mg 24 hr tablet  Commonly known as: Toprol-XL  Take 1 tablet (25 mg) by mouth once daily. Do not crush or chew.  Medication Adjustments for Surgery: Take morning of surgery with sip of water, no other fluids     multivitamin tablet  Medication Adjustments for Surgery: Stop 7 days before surgery                      CONTACT SURGEON'S OFFICE IF YOU DEVELOP:  * Fever = 100.4 F   * New respiratory symptoms (e.g. cough, shortness of breath, respiratory distress, sore throat)  * Recent loss of taste or smell  *Flu like symptoms such as headache, fatigue or gastrointestinal symptoms  * You develop any open sores, shingles, burning or painful urination   AND/OR:  * You no longer wish to have the surgery.  * Any other personal circumstances change that may lead to the need to cancel or defer this surgery.  *You were admitted to any hospital within one week of your planned procedure.    SMOKING:  *Quitting smoking can make a huge difference to your health and recovery from surgery.    *If you need help with quitting, call 0-627-QUIT-NOW.    THE DAY BEFORE SURGERY:  *Do not eat any food after midnight the night before surgery.   *You are permitted to drink clear liquids (i.e. water, black coffee, tea, clear broth, apple juice) up to 2 hours before your surgery.  DIABETICS:  Please check fasting blood sugar  upon waking up.  If fasting sugar is <80 mg/dl, please drink 100ml/3oz of apple juice no later than 2 hours prior to surgery.      SURGICAL TIME  *You will be contacted between 2 p.m. and 6 p.m. the business day before your surgery with your arrival time.  *If you haven't received a call by 6pm, call  [phone].  *Scheduled surgery times may change and you will be notified if this occurs-check your personal voicemail for any updates.    ON THE MORNING OF SURGERY:  *Wear comfortable, loose fitting clothing.   *Do not use moisturizers, creams, lotions or perfume.  *All jewelry and valuables should be left at home.  *Prosthetic devices such as contact lenses, hearing aids, dentures, eyelash extensions, hairpins and body piercing must be removed before surgery.    BRING WITH YOU:  *Photo ID and insurance card  *Current list of medicines and allergies  *Pacemaker/Defibrillator/Heart stent cards  *CPAP machine and mask  *Slings/splints/crutches  *Copy of your complete Advanced Directive/DHPOA-if applicable  *Neurostimulator implant remote    PARKING AND ARRIVAL:  *Check in at the Main Entrance desk and let them know you are here for surgery.  *You will be directed to the 2nd floor surgical waiting area.    AFTER OUTPATIENT SURGERY:  *A responsible adult MUST accompany you at the time of discharge and stay with you for 24 hours after your surgery.  *You may NOT drive yourself home after surgery.  *You may use a taxi or ride sharing service ([employer], [employer]) to return home ONLY if you are accompanied by a friend or family member.  *Instructions for resuming your medications will be provided by your surgeon.          NPO Instructions:    Do not eat any food after midnight the night before your surgery/procedure.  You may have clear liquids until TWO hours before surgery/procedure. This includes water, black tea/coffee, (no milk or cream) apple juice and electrolyte drinks (Gatorade).  You may chew gum up to TWO hours before your surgery/procedure.    Additional Instructions:

## 2023-10-28 LAB — STAPHYLOCOCCUS SPEC CULT: ABNORMAL

## 2023-11-02 ENCOUNTER — ANESTHESIA (OUTPATIENT)
Dept: OPERATING ROOM | Facility: HOSPITAL | Age: 68
DRG: 455 | End: 2023-11-02
Payer: COMMERCIAL

## 2023-11-02 ENCOUNTER — APPOINTMENT (OUTPATIENT)
Dept: RADIOLOGY | Facility: HOSPITAL | Age: 68
DRG: 455 | End: 2023-11-02
Payer: COMMERCIAL

## 2023-11-02 ENCOUNTER — ANESTHESIA EVENT (OUTPATIENT)
Dept: OPERATING ROOM | Facility: HOSPITAL | Age: 68
DRG: 455 | End: 2023-11-02
Payer: COMMERCIAL

## 2023-11-02 ENCOUNTER — HOSPITAL ENCOUNTER (INPATIENT)
Facility: HOSPITAL | Age: 68
LOS: 1 days | Discharge: HOME HEALTH | DRG: 455 | End: 2023-11-03
Attending: ORTHOPAEDIC SURGERY | Admitting: ORTHOPAEDIC SURGERY
Payer: COMMERCIAL

## 2023-11-02 ENCOUNTER — PHARMACY VISIT (OUTPATIENT)
Dept: PHARMACY | Facility: CLINIC | Age: 68
End: 2023-11-02
Payer: MEDICARE

## 2023-11-02 DIAGNOSIS — M47.816 LUMBAR SPONDYLOSIS: ICD-10-CM

## 2023-11-02 DIAGNOSIS — M43.10 SPONDYLOLISTHESIS, SITE UNSPECIFIED: ICD-10-CM

## 2023-11-02 DIAGNOSIS — M54.16 RADICULOPATHY OF LUMBAR REGION: ICD-10-CM

## 2023-11-02 DIAGNOSIS — M43.10 ISTHMIC SPONDYLOLISTHESIS: Primary | ICD-10-CM

## 2023-11-02 DIAGNOSIS — Z98.1 STATUS POST LUMBAR SPINAL FUSION: ICD-10-CM

## 2023-11-02 PROCEDURE — 22840 INSERT SPINE FIXATION DEVICE: CPT

## 2023-11-02 PROCEDURE — 2500000001 HC RX 250 WO HCPCS SELF ADMINISTERED DRUGS (ALT 637 FOR MEDICARE OP)

## 2023-11-02 PROCEDURE — 97161 PT EVAL LOW COMPLEX 20 MIN: CPT | Mod: GP

## 2023-11-02 PROCEDURE — 20930 SP BONE ALGRFT MORSEL ADD-ON: CPT | Performed by: ORTHOPAEDIC SURGERY

## 2023-11-02 PROCEDURE — 3600000018 HC OR TIME - INITIAL BASE CHARGE - PROCEDURE LEVEL SIX: Performed by: ORTHOPAEDIC SURGERY

## 2023-11-02 PROCEDURE — 3600000017 HC OR TIME - EACH INCREMENTAL 1 MINUTE - PROCEDURE LEVEL SIX: Performed by: ORTHOPAEDIC SURGERY

## 2023-11-02 PROCEDURE — C1769 GUIDE WIRE: HCPCS | Performed by: ORTHOPAEDIC SURGERY

## 2023-11-02 PROCEDURE — 72131 CT LUMBAR SPINE W/O DYE: CPT | Mod: FOREIGN READ | Performed by: RADIOLOGY

## 2023-11-02 PROCEDURE — C1821 INTERSPINOUS IMPLANT: HCPCS | Performed by: ORTHOPAEDIC SURGERY

## 2023-11-02 PROCEDURE — 0SG30J1 FUSION OF LUMBOSACRAL JOINT WITH SYNTHETIC SUBSTITUTE, POSTERIOR APPROACH, POSTERIOR COLUMN, OPEN APPROACH: ICD-10-PCS | Performed by: ORTHOPAEDIC SURGERY

## 2023-11-02 PROCEDURE — 3700000001 HC GENERAL ANESTHESIA TIME - INITIAL BASE CHARGE: Performed by: ORTHOPAEDIC SURGERY

## 2023-11-02 PROCEDURE — 20931 SP BONE ALGRFT STRUCT ADD-ON: CPT | Performed by: ORTHOPAEDIC SURGERY

## 2023-11-02 PROCEDURE — 1100000001 HC PRIVATE ROOM DAILY

## 2023-11-02 PROCEDURE — 2500000001 HC RX 250 WO HCPCS SELF ADMINISTERED DRUGS (ALT 637 FOR MEDICARE OP): Performed by: ANESTHESIOLOGY

## 2023-11-02 PROCEDURE — 2500000004 HC RX 250 GENERAL PHARMACY W/ HCPCS (ALT 636 FOR OP/ED): Performed by: NURSE ANESTHETIST, CERTIFIED REGISTERED

## 2023-11-02 PROCEDURE — 76000 FLUOROSCOPY <1 HR PHYS/QHP: CPT

## 2023-11-02 PROCEDURE — 22558 ARTHRD ANT NTRBD MIN DSC LUM: CPT

## 2023-11-02 PROCEDURE — 2500000005 HC RX 250 GENERAL PHARMACY W/O HCPCS: Performed by: ORTHOPAEDIC SURGERY

## 2023-11-02 PROCEDURE — 2500000004 HC RX 250 GENERAL PHARMACY W/ HCPCS (ALT 636 FOR OP/ED)

## 2023-11-02 PROCEDURE — 2500000004 HC RX 250 GENERAL PHARMACY W/ HCPCS (ALT 636 FOR OP/ED): Performed by: ORTHOPAEDIC SURGERY

## 2023-11-02 PROCEDURE — A22558 PR ARTHRODESIS ANT INTERBODY MIN DISCECTOMY,LUMBAR: Performed by: ANESTHESIOLOGY

## 2023-11-02 PROCEDURE — 22558 ARTHRD ANT NTRBD MIN DSC LUM: CPT | Performed by: SURGERY

## 2023-11-02 PROCEDURE — 7100000001 HC RECOVERY ROOM TIME - INITIAL BASE CHARGE: Performed by: ORTHOPAEDIC SURGERY

## 2023-11-02 PROCEDURE — 94664 DEMO&/EVAL PT USE INHALER: CPT

## 2023-11-02 PROCEDURE — 2500000004 HC RX 250 GENERAL PHARMACY W/ HCPCS (ALT 636 FOR OP/ED): Performed by: ANESTHESIOLOGY

## 2023-11-02 PROCEDURE — 2500000005 HC RX 250 GENERAL PHARMACY W/O HCPCS: Performed by: NURSE ANESTHETIST, CERTIFIED REGISTERED

## 2023-11-02 PROCEDURE — C1713 ANCHOR/SCREW BN/BN,TIS/BN: HCPCS | Performed by: ORTHOPAEDIC SURGERY

## 2023-11-02 PROCEDURE — 3700000002 HC GENERAL ANESTHESIA TIME - EACH INCREMENTAL 1 MINUTE: Performed by: ORTHOPAEDIC SURGERY

## 2023-11-02 PROCEDURE — A22558 PR ARTHRODESIS ANT INTERBODY MIN DISCECTOMY,LUMBAR: Performed by: NURSE ANESTHETIST, CERTIFIED REGISTERED

## 2023-11-02 PROCEDURE — 22840 INSERT SPINE FIXATION DEVICE: CPT | Performed by: ORTHOPAEDIC SURGERY

## 2023-11-02 PROCEDURE — 7100000002 HC RECOVERY ROOM TIME - EACH INCREMENTAL 1 MINUTE: Performed by: ORTHOPAEDIC SURGERY

## 2023-11-02 PROCEDURE — A4217 STERILE WATER/SALINE, 500 ML: HCPCS | Performed by: ORTHOPAEDIC SURGERY

## 2023-11-02 PROCEDURE — 72131 CT LUMBAR SPINE W/O DYE: CPT | Mod: FR

## 2023-11-02 PROCEDURE — 97530 THERAPEUTIC ACTIVITIES: CPT | Mod: GP

## 2023-11-02 PROCEDURE — 22558 ARTHRD ANT NTRBD MIN DSC LUM: CPT | Performed by: ORTHOPAEDIC SURGERY

## 2023-11-02 PROCEDURE — RXMED WILLOW AMBULATORY MEDICATION CHARGE

## 2023-11-02 PROCEDURE — 2500000005 HC RX 250 GENERAL PHARMACY W/O HCPCS: Performed by: ANESTHESIOLOGY

## 2023-11-02 PROCEDURE — 2780000003 HC OR 278 NO HCPCS: Performed by: ORTHOPAEDIC SURGERY

## 2023-11-02 PROCEDURE — 0ST40ZZ RESECTION OF LUMBOSACRAL DISC, OPEN APPROACH: ICD-10-PCS | Performed by: ORTHOPAEDIC SURGERY

## 2023-11-02 PROCEDURE — 2720000007 HC OR 272 NO HCPCS: Performed by: ORTHOPAEDIC SURGERY

## 2023-11-02 PROCEDURE — 0SG30A0 FUSION OF LUMBOSACRAL JOINT WITH INTERBODY FUSION DEVICE, ANTERIOR APPROACH, ANTERIOR COLUMN, OPEN APPROACH: ICD-10-PCS | Performed by: ORTHOPAEDIC SURGERY

## 2023-11-02 DEVICE — INFUSE BMP SMALL: Type: IMPLANTABLE DEVICE | Site: SPINE LUMBAR | Status: FUNCTIONAL

## 2023-11-02 DEVICE — SCREW SET, SOLERA VOYAGER, 5.5/6.0: Type: IMPLANTABLE DEVICE | Site: SPINE LUMBAR | Status: FUNCTIONAL

## 2023-11-02 DEVICE — IMPLANTABLE DEVICE: Type: IMPLANTABLE DEVICE | Site: SPINE LUMBAR | Status: FUNCTIONAL

## 2023-11-02 DEVICE — ORTHOBLAST II PUTTY, 5CC - DERIVED FROM SELECTED DONATED HUMAN BONE TISSUE THAT HAS BEEN PROCESSED INTO PARTICLES. THE PARTICLES ARE SUBSEQUENTLY DEMINERALIZED USING A HYDROCHLORIC ACID PROCESS. THE DEMINERALIZED BONE MATRIX (DBM) IS COMBINED WITH A REVERSE PHASE CARRIER, CANCELLOUS CHIPS FROM THE SAME DONOR, AND THEN FORMULATED TO A PASTE OR PUTTY-LIKE CONSISTENCY.
Type: IMPLANTABLE DEVICE | Site: SPINE LUMBAR | Status: FUNCTIONAL
Brand: ORTHOBLAST II PUTTY

## 2023-11-02 DEVICE — BONE CHIPS, CANCELL 15CC 1-4MM: Type: IMPLANTABLE DEVICE | Site: SPINE LUMBAR | Status: FUNCTIONAL

## 2023-11-02 RX ORDER — DIAZEPAM 5 MG/1
5 TABLET ORAL EVERY 6 HOURS PRN
Status: DISCONTINUED | OUTPATIENT
Start: 2023-11-02 | End: 2023-11-03 | Stop reason: HOSPADM

## 2023-11-02 RX ORDER — SODIUM CHLORIDE, SODIUM LACTATE, POTASSIUM CHLORIDE, CALCIUM CHLORIDE 600; 310; 30; 20 MG/100ML; MG/100ML; MG/100ML; MG/100ML
100 INJECTION, SOLUTION INTRAVENOUS CONTINUOUS
Status: DISCONTINUED | OUTPATIENT
Start: 2023-11-02 | End: 2023-11-03 | Stop reason: HOSPADM

## 2023-11-02 RX ORDER — LIDOCAINE HYDROCHLORIDE 10 MG/ML
0.1 INJECTION, SOLUTION EPIDURAL; INFILTRATION; INTRACAUDAL; PERINEURAL ONCE
Status: DISCONTINUED | OUTPATIENT
Start: 2023-11-02 | End: 2023-11-02 | Stop reason: HOSPADM

## 2023-11-02 RX ORDER — METOPROLOL SUCCINATE 25 MG/1
25 TABLET, EXTENDED RELEASE ORAL DAILY
Status: DISCONTINUED | OUTPATIENT
Start: 2023-11-03 | End: 2023-11-03 | Stop reason: HOSPADM

## 2023-11-02 RX ORDER — MINERAL OIL
180 ENEMA (ML) RECTAL DAILY
Status: DISCONTINUED | OUTPATIENT
Start: 2023-11-02 | End: 2023-11-02

## 2023-11-02 RX ORDER — SODIUM CHLORIDE 0.9 G/100ML
IRRIGANT IRRIGATION AS NEEDED
Status: DISCONTINUED | OUTPATIENT
Start: 2023-11-02 | End: 2023-11-02 | Stop reason: HOSPADM

## 2023-11-02 RX ORDER — DIPHENHYDRAMINE HYDROCHLORIDE 50 MG/ML
12.5 INJECTION INTRAMUSCULAR; INTRAVENOUS EVERY 6 HOURS PRN
Status: DISCONTINUED | OUTPATIENT
Start: 2023-11-02 | End: 2023-11-02

## 2023-11-02 RX ORDER — METHOCARBAMOL 100 MG/ML
INJECTION, SOLUTION INTRAMUSCULAR; INTRAVENOUS AS NEEDED
Status: DISCONTINUED | OUTPATIENT
Start: 2023-11-02 | End: 2023-11-02

## 2023-11-02 RX ORDER — ALBUTEROL SULFATE 90 UG/1
2 AEROSOL, METERED RESPIRATORY (INHALATION) EVERY 4 HOURS PRN
Status: DISCONTINUED | OUTPATIENT
Start: 2023-11-02 | End: 2023-11-02

## 2023-11-02 RX ORDER — GENTAMICIN 40 MG/ML
INJECTION, SOLUTION INTRAMUSCULAR; INTRAVENOUS AS NEEDED
Status: DISCONTINUED | OUTPATIENT
Start: 2023-11-02 | End: 2023-11-02

## 2023-11-02 RX ORDER — NALOXONE HYDROCHLORIDE 0.4 MG/ML
0.2 INJECTION, SOLUTION INTRAMUSCULAR; INTRAVENOUS; SUBCUTANEOUS EVERY 5 MIN PRN
Status: DISCONTINUED | OUTPATIENT
Start: 2023-11-02 | End: 2023-11-03 | Stop reason: HOSPADM

## 2023-11-02 RX ORDER — LIDOCAINE HYDROCHLORIDE 20 MG/ML
INJECTION, SOLUTION INFILTRATION; PERINEURAL AS NEEDED
Status: DISCONTINUED | OUTPATIENT
Start: 2023-11-02 | End: 2023-11-02

## 2023-11-02 RX ORDER — LISINOPRIL 10 MG/1
10 TABLET ORAL DAILY
Status: DISCONTINUED | OUTPATIENT
Start: 2023-11-02 | End: 2023-11-03 | Stop reason: HOSPADM

## 2023-11-02 RX ORDER — CEFAZOLIN 1 G/1
INJECTION, POWDER, FOR SOLUTION INTRAVENOUS AS NEEDED
Status: DISCONTINUED | OUTPATIENT
Start: 2023-11-02 | End: 2023-11-02

## 2023-11-02 RX ORDER — ASPIRIN 81 MG/1
81 TABLET ORAL DAILY
Status: DISCONTINUED | OUTPATIENT
Start: 2023-11-02 | End: 2023-11-03 | Stop reason: HOSPADM

## 2023-11-02 RX ORDER — GABAPENTIN 300 MG/1
600 CAPSULE ORAL 2 TIMES DAILY
Status: DISCONTINUED | OUTPATIENT
Start: 2023-11-02 | End: 2023-11-03 | Stop reason: HOSPADM

## 2023-11-02 RX ORDER — ONDANSETRON HYDROCHLORIDE 2 MG/ML
INJECTION, SOLUTION INTRAVENOUS AS NEEDED
Status: DISCONTINUED | OUTPATIENT
Start: 2023-11-02 | End: 2023-11-02

## 2023-11-02 RX ORDER — DULOXETIN HYDROCHLORIDE 30 MG/1
30 CAPSULE, DELAYED RELEASE ORAL DAILY
Status: DISCONTINUED | OUTPATIENT
Start: 2023-11-03 | End: 2023-11-03 | Stop reason: HOSPADM

## 2023-11-02 RX ORDER — SODIUM CHLORIDE, SODIUM LACTATE, POTASSIUM CHLORIDE, CALCIUM CHLORIDE 600; 310; 30; 20 MG/100ML; MG/100ML; MG/100ML; MG/100ML
100 INJECTION, SOLUTION INTRAVENOUS CONTINUOUS
Status: DISCONTINUED | OUTPATIENT
Start: 2023-11-02 | End: 2023-11-02 | Stop reason: HOSPADM

## 2023-11-02 RX ORDER — MIDAZOLAM HYDROCHLORIDE 1 MG/ML
INJECTION, SOLUTION INTRAMUSCULAR; INTRAVENOUS AS NEEDED
Status: DISCONTINUED | OUTPATIENT
Start: 2023-11-02 | End: 2023-11-02

## 2023-11-02 RX ORDER — LIDOCAINE 560 MG/1
PATCH PERCUTANEOUS; TOPICAL; TRANSDERMAL AS NEEDED
Status: DISCONTINUED | OUTPATIENT
Start: 2023-11-02 | End: 2023-11-02 | Stop reason: HOSPADM

## 2023-11-02 RX ORDER — PHENYLEPHRINE HYDROCHLORIDE 10 MG/ML
INJECTION INTRAVENOUS AS NEEDED
Status: DISCONTINUED | OUTPATIENT
Start: 2023-11-02 | End: 2023-11-02

## 2023-11-02 RX ORDER — NORETHINDRONE AND ETHINYL ESTRADIOL 0.5-0.035
KIT ORAL AS NEEDED
Status: DISCONTINUED | OUTPATIENT
Start: 2023-11-02 | End: 2023-11-02

## 2023-11-02 RX ORDER — ACETAMINOPHEN 500 MG
1000 TABLET ORAL EVERY 6 HOURS PRN
Qty: 56 TABLET | Refills: 0 | Status: SHIPPED | OUTPATIENT
Start: 2023-11-02 | End: 2023-11-10

## 2023-11-02 RX ORDER — FENTANYL CITRATE 50 UG/ML
INJECTION, SOLUTION INTRAMUSCULAR; INTRAVENOUS AS NEEDED
Status: DISCONTINUED | OUTPATIENT
Start: 2023-11-02 | End: 2023-11-02

## 2023-11-02 RX ORDER — ONDANSETRON HYDROCHLORIDE 2 MG/ML
4 INJECTION, SOLUTION INTRAVENOUS EVERY 8 HOURS PRN
Status: DISCONTINUED | OUTPATIENT
Start: 2023-11-02 | End: 2023-11-03 | Stop reason: HOSPADM

## 2023-11-02 RX ORDER — KETOROLAC TROMETHAMINE 30 MG/ML
15 INJECTION, SOLUTION INTRAMUSCULAR; INTRAVENOUS EVERY 6 HOURS PRN
Status: DISCONTINUED | OUTPATIENT
Start: 2023-11-02 | End: 2023-11-03 | Stop reason: HOSPADM

## 2023-11-02 RX ORDER — FAMOTIDINE 20 MG/1
20 TABLET, FILM COATED ORAL 2 TIMES DAILY
Status: DISCONTINUED | OUTPATIENT
Start: 2023-11-02 | End: 2023-11-03 | Stop reason: HOSPADM

## 2023-11-02 RX ORDER — OXYCODONE HYDROCHLORIDE 5 MG/1
5 TABLET ORAL EVERY 6 HOURS PRN
Qty: 28 TABLET | Refills: 0 | Status: SHIPPED | OUTPATIENT
Start: 2023-11-02 | End: 2023-11-09 | Stop reason: SDUPTHER

## 2023-11-02 RX ORDER — ALBUTEROL SULFATE 0.83 MG/ML
2.5 SOLUTION RESPIRATORY (INHALATION) EVERY 4 HOURS PRN
Status: DISCONTINUED | OUTPATIENT
Start: 2023-11-02 | End: 2023-11-03 | Stop reason: HOSPADM

## 2023-11-02 RX ORDER — OXYCODONE HYDROCHLORIDE 5 MG/1
5 TABLET ORAL EVERY 4 HOURS PRN
Status: DISCONTINUED | OUTPATIENT
Start: 2023-11-02 | End: 2023-11-03 | Stop reason: HOSPADM

## 2023-11-02 RX ORDER — PROPOFOL 10 MG/ML
INJECTION, EMULSION INTRAVENOUS AS NEEDED
Status: DISCONTINUED | OUTPATIENT
Start: 2023-11-02 | End: 2023-11-02

## 2023-11-02 RX ORDER — CEFAZOLIN SODIUM 2 G/100ML
2 INJECTION, SOLUTION INTRAVENOUS EVERY 8 HOURS
Status: COMPLETED | OUTPATIENT
Start: 2023-11-02 | End: 2023-11-03

## 2023-11-02 RX ORDER — SCOLOPAMINE TRANSDERMAL SYSTEM 1 MG/1
1 PATCH, EXTENDED RELEASE TRANSDERMAL
Status: DISCONTINUED | OUTPATIENT
Start: 2023-11-02 | End: 2023-11-03 | Stop reason: HOSPADM

## 2023-11-02 RX ORDER — ONDANSETRON HYDROCHLORIDE 2 MG/ML
4 INJECTION, SOLUTION INTRAVENOUS ONCE AS NEEDED
Status: DISCONTINUED | OUTPATIENT
Start: 2023-11-02 | End: 2023-11-02 | Stop reason: HOSPADM

## 2023-11-02 RX ORDER — ROCURONIUM BROMIDE 10 MG/ML
INJECTION, SOLUTION INTRAVENOUS AS NEEDED
Status: DISCONTINUED | OUTPATIENT
Start: 2023-11-02 | End: 2023-11-02

## 2023-11-02 RX ORDER — OXYCODONE HYDROCHLORIDE 5 MG/1
5 TABLET ORAL EVERY 4 HOURS PRN
Status: DISCONTINUED | OUTPATIENT
Start: 2023-11-02 | End: 2023-11-02 | Stop reason: HOSPADM

## 2023-11-02 RX ORDER — DOCUSATE SODIUM 100 MG/1
100 CAPSULE, LIQUID FILLED ORAL 3 TIMES DAILY PRN
Qty: 21 CAPSULE | Refills: 0 | Status: SHIPPED | OUTPATIENT
Start: 2023-11-02 | End: 2023-11-10

## 2023-11-02 RX ORDER — DIPHENHYDRAMINE HCL 25 MG
25 CAPSULE ORAL EVERY 6 HOURS PRN
Status: DISCONTINUED | OUTPATIENT
Start: 2023-11-02 | End: 2023-11-03 | Stop reason: HOSPADM

## 2023-11-02 RX ORDER — OXYCODONE HYDROCHLORIDE 5 MG/1
10 TABLET ORAL EVERY 4 HOURS PRN
Status: DISCONTINUED | OUTPATIENT
Start: 2023-11-02 | End: 2023-11-03 | Stop reason: HOSPADM

## 2023-11-02 RX ORDER — LORATADINE 10 MG/1
10 TABLET ORAL DAILY
Status: DISCONTINUED | OUTPATIENT
Start: 2023-11-02 | End: 2023-11-03 | Stop reason: HOSPADM

## 2023-11-02 RX ORDER — ONDANSETRON 4 MG/1
4 TABLET, FILM COATED ORAL EVERY 8 HOURS PRN
Status: DISCONTINUED | OUTPATIENT
Start: 2023-11-02 | End: 2023-11-03 | Stop reason: HOSPADM

## 2023-11-02 RX ORDER — DEXAMETHASONE SODIUM PHOSPHATE 100 MG/10ML
INJECTION INTRAMUSCULAR; INTRAVENOUS AS NEEDED
Status: DISCONTINUED | OUTPATIENT
Start: 2023-11-02 | End: 2023-11-02

## 2023-11-02 RX ORDER — CYCLOBENZAPRINE HCL 10 MG
10 TABLET ORAL 3 TIMES DAILY PRN
Qty: 30 TABLET | Refills: 0 | Status: SHIPPED | OUTPATIENT
Start: 2023-11-02 | End: 2023-11-14 | Stop reason: ALTCHOICE

## 2023-11-02 RX ORDER — DEXAMETHASONE SODIUM PHOSPHATE 10 MG/ML
10 INJECTION INTRAMUSCULAR; INTRAVENOUS EVERY 8 HOURS SCHEDULED
Status: COMPLETED | OUTPATIENT
Start: 2023-11-02 | End: 2023-11-03

## 2023-11-02 RX ORDER — POLYETHYLENE GLYCOL 3350 17 G/17G
17 POWDER, FOR SOLUTION ORAL DAILY
Status: DISCONTINUED | OUTPATIENT
Start: 2023-11-02 | End: 2023-11-03 | Stop reason: HOSPADM

## 2023-11-02 RX ORDER — BUPIVACAINE HCL/EPINEPHRINE 0.25-.0005
VIAL (ML) INJECTION AS NEEDED
Status: DISCONTINUED | OUTPATIENT
Start: 2023-11-02 | End: 2023-11-02 | Stop reason: HOSPADM

## 2023-11-02 RX ORDER — ATORVASTATIN CALCIUM 20 MG/1
20 TABLET, FILM COATED ORAL NIGHTLY
Status: DISCONTINUED | OUTPATIENT
Start: 2023-11-02 | End: 2023-11-03 | Stop reason: HOSPADM

## 2023-11-02 RX ADMIN — EPHEDRINE SULFATE 10 MG: 50 INJECTION, SOLUTION INTRAVENOUS at 10:10

## 2023-11-02 RX ADMIN — OXYCODONE HYDROCHLORIDE 10 MG: 5 TABLET ORAL at 22:16

## 2023-11-02 RX ADMIN — PROPOFOL 200 MG: 10 INJECTION, EMULSION INTRAVENOUS at 07:55

## 2023-11-02 RX ADMIN — GENTAMICIN SULFATE 80 MG: 40 INJECTION, SOLUTION INTRAMUSCULAR; INTRAVENOUS at 08:03

## 2023-11-02 RX ADMIN — EPHEDRINE SULFATE 10 MG: 50 INJECTION, SOLUTION INTRAVENOUS at 10:14

## 2023-11-02 RX ADMIN — Medication 6 L/MIN: at 10:59

## 2023-11-02 RX ADMIN — EPHEDRINE SULFATE 10 MG: 50 INJECTION, SOLUTION INTRAVENOUS at 09:06

## 2023-11-02 RX ADMIN — PHENYLEPHRINE HYDROCHLORIDE 200 MCG: 10 INJECTION INTRAVENOUS at 07:59

## 2023-11-02 RX ADMIN — FENTANYL CITRATE 100 MCG: 50 INJECTION, SOLUTION INTRAMUSCULAR; INTRAVENOUS at 07:44

## 2023-11-02 RX ADMIN — DEXAMETHASONE SODIUM PHOSPHATE 10 MG: 10 INJECTION, SOLUTION INTRAMUSCULAR; INTRAVENOUS at 17:48

## 2023-11-02 RX ADMIN — FAMOTIDINE 20 MG: 20 TABLET, FILM COATED ORAL at 22:17

## 2023-11-02 RX ADMIN — EPHEDRINE SULFATE 10 MG: 50 INJECTION, SOLUTION INTRAVENOUS at 08:14

## 2023-11-02 RX ADMIN — ONDANSETRON 4 MG: 2 INJECTION INTRAMUSCULAR; INTRAVENOUS at 07:46

## 2023-11-02 RX ADMIN — OXYCODONE HYDROCHLORIDE 5 MG: 5 TABLET ORAL at 12:32

## 2023-11-02 RX ADMIN — CEFAZOLIN SODIUM 2 G: 1 POWDER, FOR SOLUTION INTRAMUSCULAR; INTRAVENOUS at 07:59

## 2023-11-02 RX ADMIN — CEFAZOLIN SODIUM 2 G: 2 INJECTION, SOLUTION INTRAVENOUS at 17:48

## 2023-11-02 RX ADMIN — LIDOCAINE HYDROCHLORIDE 100 MG: 20 INJECTION, SOLUTION INFILTRATION; PERINEURAL at 07:55

## 2023-11-02 RX ADMIN — HYDROMORPHONE HYDROCHLORIDE 0.5 MG: 1 INJECTION, SOLUTION INTRAMUSCULAR; INTRAVENOUS; SUBCUTANEOUS at 11:12

## 2023-11-02 RX ADMIN — FENTANYL CITRATE 100 MCG: 50 INJECTION, SOLUTION INTRAMUSCULAR; INTRAVENOUS at 07:55

## 2023-11-02 RX ADMIN — Medication 2 L/MIN: at 11:34

## 2023-11-02 RX ADMIN — OXYCODONE HYDROCHLORIDE 10 MG: 5 TABLET ORAL at 16:58

## 2023-11-02 RX ADMIN — HYDROMORPHONE HYDROCHLORIDE 0.5 MG: 1 INJECTION, SOLUTION INTRAMUSCULAR; INTRAVENOUS; SUBCUTANEOUS at 11:19

## 2023-11-02 RX ADMIN — EPHEDRINE SULFATE 10 MG: 50 INJECTION, SOLUTION INTRAVENOUS at 10:07

## 2023-11-02 RX ADMIN — METHOCARBAMOL 1000 MG: 100 INJECTION INTRAMUSCULAR; INTRAVENOUS at 10:14

## 2023-11-02 RX ADMIN — HYDROMORPHONE HYDROCHLORIDE 0.5 MG: 1 INJECTION, SOLUTION INTRAMUSCULAR; INTRAVENOUS; SUBCUTANEOUS at 12:57

## 2023-11-02 RX ADMIN — MIDAZOLAM HYDROCHLORIDE 2 MG: 1 INJECTION, SOLUTION INTRAMUSCULAR; INTRAVENOUS at 07:44

## 2023-11-02 RX ADMIN — HYDROMORPHONE HYDROCHLORIDE 0.5 MG: 1 INJECTION, SOLUTION INTRAMUSCULAR; INTRAVENOUS; SUBCUTANEOUS at 13:54

## 2023-11-02 RX ADMIN — ASPIRIN 81 MG: 81 TABLET, COATED ORAL at 17:48

## 2023-11-02 RX ADMIN — DEXAMETHASONE SODIUM PHOSPHATE 10 MG: 10 INJECTION INTRAMUSCULAR; INTRAVENOUS at 07:57

## 2023-11-02 RX ADMIN — GABAPENTIN 600 MG: 300 CAPSULE ORAL at 22:17

## 2023-11-02 RX ADMIN — SODIUM CHLORIDE, SODIUM LACTATE, POTASSIUM CHLORIDE, AND CALCIUM CHLORIDE: 600; 310; 30; 20 INJECTION, SOLUTION INTRAVENOUS at 07:46

## 2023-11-02 RX ADMIN — ROCURONIUM BROMIDE 50 MG: 10 INJECTION, SOLUTION INTRAVENOUS at 07:55

## 2023-11-02 RX ADMIN — SODIUM CHLORIDE, POTASSIUM CHLORIDE, SODIUM LACTATE AND CALCIUM CHLORIDE 100 ML/HR: 600; 310; 30; 20 INJECTION, SOLUTION INTRAVENOUS at 11:14

## 2023-11-02 RX ADMIN — ROCURONIUM BROMIDE 30 MG: 10 INJECTION, SOLUTION INTRAVENOUS at 09:22

## 2023-11-02 RX ADMIN — HYDROMORPHONE HYDROCHLORIDE 0.5 MG: 1 INJECTION, SOLUTION INTRAMUSCULAR; INTRAVENOUS; SUBCUTANEOUS at 11:27

## 2023-11-02 RX ADMIN — DEXAMETHASONE SODIUM PHOSPHATE 10 MG: 10 INJECTION, SOLUTION INTRAMUSCULAR; INTRAVENOUS at 22:17

## 2023-11-02 RX ADMIN — ATORVASTATIN CALCIUM 20 MG: 20 TABLET, FILM COATED ORAL at 22:17

## 2023-11-02 RX ADMIN — LISINOPRIL 10 MG: 10 TABLET ORAL at 17:48

## 2023-11-02 SDOH — HEALTH STABILITY: MENTAL HEALTH: HOW MANY STANDARD DRINKS CONTAINING ALCOHOL DO YOU HAVE ON A TYPICAL DAY?: PATIENT DOES NOT DRINK

## 2023-11-02 SDOH — ECONOMIC STABILITY: TRANSPORTATION INSECURITY
IN THE PAST 12 MONTHS, HAS LACK OF TRANSPORTATION KEPT YOU FROM MEETINGS, WORK, OR FROM GETTING THINGS NEEDED FOR DAILY LIVING?: NO

## 2023-11-02 SDOH — SOCIAL STABILITY: SOCIAL INSECURITY: ARE YOU OR HAVE YOU BEEN THREATENED OR ABUSED PHYSICALLY, EMOTIONALLY, OR SEXUALLY BY ANYONE?: NO

## 2023-11-02 SDOH — ECONOMIC STABILITY: INCOME INSECURITY: IN THE PAST 12 MONTHS, HAS THE ELECTRIC, GAS, OIL, OR WATER COMPANY THREATENED TO SHUT OFF SERVICE IN YOUR HOME?: NO

## 2023-11-02 SDOH — SOCIAL STABILITY: SOCIAL INSECURITY: HAVE YOU HAD THOUGHTS OF HARMING ANYONE ELSE?: NO

## 2023-11-02 SDOH — ECONOMIC STABILITY: HOUSING INSECURITY
IN THE LAST 12 MONTHS, WAS THERE A TIME WHEN YOU DID NOT HAVE A STEADY PLACE TO SLEEP OR SLEPT IN A SHELTER (INCLUDING NOW)?: NO

## 2023-11-02 SDOH — HEALTH STABILITY: MENTAL HEALTH: HOW OFTEN DO YOU HAVE 6 OR MORE DRINKS ON ONE OCCASION?: NEVER

## 2023-11-02 SDOH — HEALTH STABILITY: MENTAL HEALTH: HOW OFTEN DO YOU HAVE A DRINK CONTAINING ALCOHOL?: NEVER

## 2023-11-02 SDOH — SOCIAL STABILITY: SOCIAL INSECURITY: DO YOU FEEL UNSAFE GOING BACK TO THE PLACE WHERE YOU ARE LIVING?: NO

## 2023-11-02 SDOH — SOCIAL STABILITY: SOCIAL INSECURITY: WERE YOU ABLE TO COMPLETE ALL THE BEHAVIORAL HEALTH SCREENINGS?: YES

## 2023-11-02 SDOH — ECONOMIC STABILITY: HOUSING INSECURITY: IN THE LAST 12 MONTHS, HOW MANY PLACES HAVE YOU LIVED?: 1

## 2023-11-02 SDOH — SOCIAL STABILITY: SOCIAL INSECURITY: HAS ANYONE EVER THREATENED TO HURT YOUR FAMILY OR YOUR PETS?: NO

## 2023-11-02 SDOH — ECONOMIC STABILITY: INCOME INSECURITY: HOW HARD IS IT FOR YOU TO PAY FOR THE VERY BASICS LIKE FOOD, HOUSING, MEDICAL CARE, AND HEATING?: NOT HARD AT ALL

## 2023-11-02 SDOH — SOCIAL STABILITY: SOCIAL INSECURITY: DO YOU FEEL ANYONE HAS EXPLOITED OR TAKEN ADVANTAGE OF YOU FINANCIALLY OR OF YOUR PERSONAL PROPERTY?: NO

## 2023-11-02 SDOH — SOCIAL STABILITY: SOCIAL INSECURITY: ARE THERE ANY APPARENT SIGNS OF INJURIES/BEHAVIORS THAT COULD BE RELATED TO ABUSE/NEGLECT?: NO

## 2023-11-02 SDOH — ECONOMIC STABILITY: INCOME INSECURITY: IN THE LAST 12 MONTHS, WAS THERE A TIME WHEN YOU WERE NOT ABLE TO PAY THE MORTGAGE OR RENT ON TIME?: NO

## 2023-11-02 SDOH — SOCIAL STABILITY: SOCIAL INSECURITY: ABUSE: ADULT

## 2023-11-02 SDOH — ECONOMIC STABILITY: TRANSPORTATION INSECURITY
IN THE PAST 12 MONTHS, HAS THE LACK OF TRANSPORTATION KEPT YOU FROM MEDICAL APPOINTMENTS OR FROM GETTING MEDICATIONS?: NO

## 2023-11-02 SDOH — SOCIAL STABILITY: SOCIAL INSECURITY: DOES ANYONE TRY TO KEEP YOU FROM HAVING/CONTACTING OTHER FRIENDS OR DOING THINGS OUTSIDE YOUR HOME?: NO

## 2023-11-02 ASSESSMENT — PAIN DESCRIPTION - DESCRIPTORS
DESCRIPTORS: CRAMPING;PINS AND NEEDLES
DESCRIPTORS: SHARP

## 2023-11-02 ASSESSMENT — PAIN SCALES - GENERAL
PAINLEVEL_OUTOF10: 0 - NO PAIN
PAINLEVEL_OUTOF10: 0 - NO PAIN
PAINLEVEL_OUTOF10: 9
PAINLEVEL_OUTOF10: 7
PAINLEVEL_OUTOF10: 7
PAINLEVEL_OUTOF10: 0 - NO PAIN
PAINLEVEL_OUTOF10: 5 - MODERATE PAIN
PAINLEVEL_OUTOF10: 9
PAINLEVEL_OUTOF10: 8
PAINLEVEL_OUTOF10: 5 - MODERATE PAIN
PAINLEVEL_OUTOF10: 8
PAINLEVEL_OUTOF10: 4
PAINLEVEL_OUTOF10: 9
PAINLEVEL_OUTOF10: 5 - MODERATE PAIN
PAINLEVEL_OUTOF10: 0 - NO PAIN
PAINLEVEL_OUTOF10: 8
PAINLEVEL_OUTOF10: 8
PAIN_LEVEL: 5
PAINLEVEL_OUTOF10: 7
PAINLEVEL_OUTOF10: 0 - NO PAIN
PAINLEVEL_OUTOF10: 9
PAINLEVEL_OUTOF10: 8
PAINLEVEL_OUTOF10: 8
PAINLEVEL_OUTOF10: 9

## 2023-11-02 ASSESSMENT — PAIN - FUNCTIONAL ASSESSMENT

## 2023-11-02 ASSESSMENT — COGNITIVE AND FUNCTIONAL STATUS - GENERAL
MOBILITY SCORE: 18
DRESSING REGULAR UPPER BODY CLOTHING: A LITTLE
CLIMB 3 TO 5 STEPS WITH RAILING: A LITTLE
STANDING UP FROM CHAIR USING ARMS: A LITTLE
MOVING TO AND FROM BED TO CHAIR: A LITTLE
MOBILITY SCORE: 16
HELP NEEDED FOR BATHING: A LITTLE
TOILETING: A LITTLE
MOVING TO AND FROM BED TO CHAIR: A LITTLE
MOBILITY SCORE: 18
DAILY ACTIVITIY SCORE: 19
TURNING FROM BACK TO SIDE WHILE IN FLAT BAD: A LITTLE
CLIMB 3 TO 5 STEPS WITH RAILING: A LOT
STANDING UP FROM CHAIR USING ARMS: A LITTLE
DRESSING REGULAR LOWER BODY CLOTHING: A LOT
MOVING FROM LYING ON BACK TO SITTING ON SIDE OF FLAT BED WITH BEDRAILS: A LITTLE
MOVING FROM LYING ON BACK TO SITTING ON SIDE OF FLAT BED WITH BEDRAILS: A LITTLE
WALKING IN HOSPITAL ROOM: A LITTLE
STANDING UP FROM CHAIR USING ARMS: A LITTLE
DRESSING REGULAR UPPER BODY CLOTHING: A LITTLE
CLIMB 3 TO 5 STEPS WITH RAILING: A LITTLE
MOVING FROM LYING ON BACK TO SITTING ON SIDE OF FLAT BED WITH BEDRAILS: A LITTLE
TURNING FROM BACK TO SIDE WHILE IN FLAT BAD: A LITTLE
DRESSING REGULAR LOWER BODY CLOTHING: A LOT
MOVING TO AND FROM BED TO CHAIR: A LITTLE
DAILY ACTIVITIY SCORE: 19
HELP NEEDED FOR BATHING: A LITTLE
PATIENT BASELINE BEDBOUND: NO
TURNING FROM BACK TO SIDE WHILE IN FLAT BAD: A LITTLE
WALKING IN HOSPITAL ROOM: A LOT
WALKING IN HOSPITAL ROOM: A LITTLE
TOILETING: A LITTLE

## 2023-11-02 ASSESSMENT — ACTIVITIES OF DAILY LIVING (ADL)
BATHING: INDEPENDENT
ADL_ASSISTANCE: INDEPENDENT
ADEQUATE_TO_COMPLETE_ADL: YES
PATIENT'S MEMORY ADEQUATE TO SAFELY COMPLETE DAILY ACTIVITIES?: YES
JUDGMENT_ADEQUATE_SAFELY_COMPLETE_DAILY_ACTIVITIES: YES
HEARING - LEFT EAR: FUNCTIONAL
DRESSING YOURSELF: INDEPENDENT
FEEDING YOURSELF: INDEPENDENT
WALKS IN HOME: NEEDS ASSISTANCE
TOILETING: INDEPENDENT
GROOMING: INDEPENDENT
HEARING - RIGHT EAR: FUNCTIONAL

## 2023-11-02 ASSESSMENT — LIFESTYLE VARIABLES
SKIP TO QUESTIONS 9-10: 1
AUDIT-C TOTAL SCORE: 0
HOW OFTEN DO YOU HAVE 6 OR MORE DRINKS ON ONE OCCASION: NEVER
AUDIT-C TOTAL SCORE: 0
AUDIT-C TOTAL SCORE: 0
HOW MANY STANDARD DRINKS CONTAINING ALCOHOL DO YOU HAVE ON A TYPICAL DAY: PATIENT DOES NOT DRINK
SKIP TO QUESTIONS 9-10: 1
HOW OFTEN DO YOU HAVE A DRINK CONTAINING ALCOHOL: NEVER

## 2023-11-02 ASSESSMENT — COLUMBIA-SUICIDE SEVERITY RATING SCALE - C-SSRS
6. HAVE YOU EVER DONE ANYTHING, STARTED TO DO ANYTHING, OR PREPARED TO DO ANYTHING TO END YOUR LIFE?: NO
2. HAVE YOU ACTUALLY HAD ANY THOUGHTS OF KILLING YOURSELF?: NO
1. IN THE PAST MONTH, HAVE YOU WISHED YOU WERE DEAD OR WISHED YOU COULD GO TO SLEEP AND NOT WAKE UP?: NO

## 2023-11-02 ASSESSMENT — PATIENT HEALTH QUESTIONNAIRE - PHQ9
1. LITTLE INTEREST OR PLEASURE IN DOING THINGS: NOT AT ALL
2. FEELING DOWN, DEPRESSED OR HOPELESS: NOT AT ALL
SUM OF ALL RESPONSES TO PHQ9 QUESTIONS 1 & 2: 0

## 2023-11-02 NOTE — PERIOPERATIVE NURSING NOTE
1059- Patient to PACU bay at this time in stable condition. Bedside monitors applied to patient upon arrival to PACU. Report received from OR Team at bedside.     1105- Patient on RA at this time    1108- Ice pack applied to patient's back     1110- Patient states he is having severe pain in his right leg in his thigh. Checked right femoral, popliteal, and pedal pulses. Pulses are normal. Right lower extremity is noted to be warm and pink and is consistent with his left lower extremity.     1112- Medicated patient at this time per orders for patient pain rating of 9/10. Verified allergies prior to admin    1119- Medicated patient at this time per orders for patient pain rating of 9/10. Verified allergies prior to admin    1127- Medicated patient at this time per orders for patient pain rating of 9/10. Verified allergies prior to admin    1128- Elevated patient's right leg at this time for pain relief. Patient states the repositioning/elevation helped.     1134- Patient placed on 2 L o2 via nasal cannula at this time for falling spo2; Patient asleep at this time     1144- Updated patient's family member at this time via patient text system     1207- Report to SARTHAK Perales

## 2023-11-02 NOTE — OP NOTE
L5-S1 Anterior Lumbar Fusion; Posterior Instrumentation Operative Note     Date: 2023  OR Location: [facility]    Name: Angel Jordan, : 1955, Age: 68 y.o., MRN: [id number], Sex: male    Diagnosis  Pre-op Diagnosis     * Spondylolisthesis, site unspecified [M43.10] Post-op Diagnosis     * Spondylolisthesis, site unspecified [M43.10]     Procedures    * L5-S1 Anterior Lumbar Fusion; Posterior Instrumentation    * Spinal Exposure    Surgeons   Panel 1:     * Jamie Farrell - Primary  Panel 2:     * Jamie Iverson - Primary    Resident/Fellow/Other Assistant:  Surgeon(s) and Role:    Procedure Summary  Anesthesia: General  ASA: III  Anesthesia Staff: Anesthesiologist: Alfonso Lawrence MD  CRNA: STACIE Gramajo-CRNA  Estimated Blood Loss: 25mL  Intra-op Medications:   Medication Name Total Dose   gelatin absorbable (Gelfoam) 100 sponge 1 each   thrombin (recombinant) (Recothrom) topical solution 10,000 Units   sodium chloride 0.9 % irrigation solution 1,000 mL   BUPivacaine-EPINEPHrine (Marcaine w/EPI) 0.25 %-1:200,000 injection 30 mL   lidocaine 4 % patch 1 patch   HYDROmorphone (Dilaudid) injection 0.5 mg 1.5 mg   lactated Ringer's infusion 26.67 mL   oxygen (O2) therapy 36 L              Anesthesia Record               Intraprocedure I/O Totals          Intake    LR 1700.00 mL    Propofol Drip 0.00 mL    The total shown is the total volume documented since Anesthesia Start was filed.    Total Intake 1700 mL          Specimen: No specimens collected     Staff:   Circulator: Gallito Richardson RN  Relief Circulator: Myriam Riley RN  Scrub Person: Prakash Hatch RN         Drains and/or Catheters:   [REMOVED] Urethral Catheter 16 Fr. (Removed)   Site Assessment Clean;Skin intact 23 1059   Collection Container Standard drainage bag 23 1059   Securement Method Securing device (Describe) 23 1059       Tourniquet Times:         Implants:  Implants       Type Name Action Serial No.      Graft  INFUSE BMP SMALL - SN/A - [id number] Implanted N/A     Graft BONE CHIPS, CANCELL 15CC 1-4MM - [id number] - [id number] Implanted [id number]     Spinal Hardware PUTTY, ORTHOBLAST II 5ML - [id number] - [id number] Implanted [id number]     Tissue FRA ANTERIOR, NARROW, LORDOTIC, 17MM Implanted [id number]     Screw WASHER, 13MM - SN/A - [id number] Implanted N/A     Screw 6.5X20 SCREW Implanted N/A      SCREW SET, SOLERA VOYAGER, 5.5/6.0 - SN/A - [id number] Implanted N/A     Screw 7.5X50MM SCREW Implanted N/A     Screw 7.5X45 SCREW Implanted N/A     Screw 35MM LAURA Implanted N/A     Screw 30MM LAURA Implanted N/A              Findings:      Indications: Angel Jordan is an 68 y.o. male who is having surgery for Spondylolisthesis, site unspecified [M43.10].      The patient was seen in the preoperative area. The risks, benefits, complications, treatment options, non-operative alternatives, expected recovery and outcomes were discussed with the patient. The possibilities of reaction to medication, pulmonary aspiration, injury to surrounding structures, bleeding, recurrent infection, the need for additional procedures, failure to diagnose a condition, and creating a complication requiring transfusion or operation were discussed with the patient. The patient concurred with the proposed plan, giving informed consent.  The site of surgery was properly noted/marked if necessary per policy. The patient has been actively warmed in preoperative area. Preoperative antibiotics have been ordered and given within 1 hours of incision. Venous thrombosis prophylaxis have been ordered including bilateral sequential compression devices    Procedure Details: Patient was brought to the OR suite placed in the supine position administered general anesthetic with endotracheal tube intubation.  Anterior abdominal wall is sterilely prepped and draped standard fashion.  Infraumbilical midline incision was made fascia was noted we entered the fascia entered the  retrorectus space with no difficulty.  The retroperitoneum.  Self-retaining retractor was placed his veins are noted and spared.  Arteries identified and ligated in standard fashion.  L5-S1 interspace seen is adequately exposed.  We proceeded with discectomy cage placement and BMP without event.  See orthopedic spine for specific implant details.  Dispersed procedure well without event.  Excellent hemostasis maintained.  At the completion of this portion of the procedure fascia was closed with #1 looped PDS.  Subcutaneous 2-0 Vicryl sutures were placed as well as 4-0 Monocryl subcuticular stitch.  Sterile dressing was applied patient tolerated procedure well and orthopedic spine procedure with posterior approach.  Complications:  None; patient tolerated the procedure well.    Disposition: PACU - hemodynamically stable.  Condition: stable         Additional Details:     Attending Attestation:     Jamie Farrell  Phone Number: [phone]

## 2023-11-02 NOTE — ANESTHESIA POSTPROCEDURE EVALUATION
Patient: Angel Jordan    Procedure Summary       Date: 11/02/23 Room / Location: [facility] 07 / [facility]    Anesthesia Start: 0746 Anesthesia Stop: 1122    Procedures:       L5-S1 Anterior Lumbar Fusion; Posterior Instrumentation (Back)      Spinal Exposure (Abdomen) Diagnosis:       Spondylolisthesis, site unspecified      (Spondylolisthesis, site unspecified [M43.10])    Surgeons: Jamie Farrell MD; Jamie Iverson DO Responsible Provider: Alfonso Lawrence MD    Anesthesia Type: general ASA Status: 3            Anesthesia Type: general    Vitals Value Taken Time   /67 11/02/23 1230   Temp 36.4 °C (97.5 °F) 11/02/23 1200   Pulse 88 11/02/23 1243   Resp 12 11/02/23 1230   SpO2 96 % 11/02/23 1243   Vitals shown include unvalidated device data.    Anesthesia Post Evaluation    Patient location during evaluation: floor  Patient participation: complete - patient participated  Level of consciousness: awake  Pain score: 5  Pain management: adequate  Airway patency: patent  Cardiovascular status: acceptable  Respiratory status: acceptable        There were no known notable events for this encounter.

## 2023-11-02 NOTE — OP NOTE
L5-S1 Anterior Lumbar Fusion; Posterior Instrumentation Operative Note     Date: 2023  OR Location: [facility] OR    Name: Angel Jordan, : 1955, Age: 68 y.o., MRN: [id number], Sex: male    Diagnosis  Pre-op Diagnosis     * Spondylolisthesis, site unspecified [M43.10] Post-op Diagnosis     * Spondylolisthesis, site unspecified [M43.10]     Procedures  L5-S1 anterior lumbar interbody fusion with femoral ring allograft, bone morphogenic protein, and allograft chips through a transabdominal direct anterior approach; percutaneous pedicle screw instrumentation L5-S1 with Voyager instrumentation    Surgeons   Panel 1:     * Jamie Farrell - Primary  Panel 2:     * Jamie Iverson - Primary    Resident/Fellow/Other Assistant:  Surgeon(s) and Role: Colten Otoole PA-C    Procedure Summary  Anesthesia: General  ASA: III  Anesthesia Staff: Anesthesiologist: Alfonso Lawrence MD  CRNA: STACIE Gramajo-CRNA  Estimated Blood Loss: 25mL  Intra-op Medications:   Medication Name Total Dose   gelatin absorbable (Gelfoam) 100 sponge 1 each   thrombin (recombinant) (Recothrom) topical solution 10,000 Units   BUPivacaine-EPINEPHrine (Marcaine w/EPI) 0.25 %-1:200,000 injection 10 mL   lidocaine 4 % patch 1 patch              Anesthesia Record               Intraprocedure I/O Totals          Intake    Propofol Drip 0.00 mL    The total shown is the total volume documented since Anesthesia Start was filed.    Total Intake 0 mL          Specimen: No specimens collected     Staff:   Circulator: Gallito Richardson RN  Relief Circulator: Myriam Riley RN  Scrub Person: Prakash Hatch RN         Drains and/or Catheters:   Urethral Catheter 16 Fr. (Active)       Tourniquet Times:         Implants:  Implants       Type Name Action Serial No.      Graft INFUSE BMP SMALL - SN/A - [id number] Implanted N/A     Graft BONE CHIPS, CANCELL 15CC 1-4MM - [id number] - [id number] Implanted [id number]     Spinal Hardware PUTTY, ORTHOBLAST II 5ML - [id number]  - [id number] Implanted [id number]     Tissue FRA ANTERIOR, NARROW, LORDOTIC, 17MM Implanted [id number]     Screw WASHER, 13MM - SN/A - [id number] Implanted N/A     Screw 6.5X20 SCREW Implanted N/A      SCREW SET, SOLERA LISAGER, 5.5/6.0 - SN/A - [id number] Implanted N/A     Screw 7.5X50MM SCREW Implanted N/A     Screw 7.5X45 SCREW Implanted N/A     Screw 35MM LAURA Implanted N/A      IMPLANT RECORD Implanted                   Indications: Angel Jordan is an 68 y.o. male who is having surgery for Spondylolisthesis, site unspecified [M43.10].     The patient was seen in the preoperative area. The risks, benefits, complications, treatment options, non-operative alternatives, expected recovery and outcomes were discussed with the patient. The possibilities of reaction to medication, pulmonary aspiration, injury to surrounding structures, bleeding, recurrent infection, the need for additional procedures, failure to diagnose a condition, and creating a complication requiring transfusion or operation were discussed with the patient. The patient concurred with the proposed plan, giving informed consent.  The site of surgery was properly noted/marked if necessary per policy. The patient has been actively warmed in preoperative area. Preoperative antibiotics have been ordered and given within 1 hours of incision. Venous thrombosis prophylaxis have been ordered including bilateral sequential compression devices    Procedure Details: Patient was taken to the operating room where a formal timeout was done according to hospital protocol.  Patient was intubated without difficulty.  Patient was given preoperative antibiotics.  Patient was placed supine on the Colten table.  His abdomen was then prepped and draped in usual fashion.  A direct anterior approach to the L5-S1 level was then performed by Dr. Jamie Iverson.  Please refer to his operative note for exposure details.  Once the L5-S1 level was identified, we then did a complete  discectomy.  We dilated open the disc base, until we are satisfied with a 17 mm femoral ring allograft which was packed with a small infuse sponge, allograft chips and demineralized bone matrix.  The allograft was placed to the appropriate position.  2 anterior blocking screws were then applied and the L5 and S1 vertebral bodies respectively.  X-ray confirmed satisfactory placement of the implants.  The wound was then closed in the usual fashion by vascular surgery.  Please refer to their operative dictation for details.    The patient was then rolled in the prone position on the Colten table.  Lumbar spine was prepped and draped in usual fashion.  C-arm fluoroscopy was used to place percutaneous pedicle screws at L5 and S1 bilaterally.  2 submuscular rods were passed.  Screws were tightened  specification.  X-ray confirmed satisfactory placement of the instrumentation.  Wounds were closed in usual fashion sterile dressing was applied.    I was present and scrubbed for the entire procedure.  The physician assistant was present, scrubbed and participated in all portions of the procedure, as no qualified surgeon physician and/or resident surgeon was available to assist in the case.      Jamie Farrell MD    Chief of Spine Surgery, [facility]  Director of Spine Service, [facility]  , Department of Orthopaedics  [facility] [employer]  [address] [place] [address]  [email or web address]  P: [phone]    This note was dictated with voice recognition software.  It has not been proofread for grammatical errors, typographical mistakes or other semantic inconsistencies.    Complications:  None; patient tolerated the procedure well.    Disposition: PACU - hemodynamically stable.  Condition: stable             Attending Attestation:     Jamie Farrell  Phone Number: [phone]

## 2023-11-02 NOTE — ANESTHESIA PREPROCEDURE EVALUATION
Patient: Angel Jordan    Procedure Information       Anesthesia Start Date/Time: 11/02/23 0746    Procedures:       L5-S1 Anterior Lumbar Fusion; Posterior Instrumentation (Back)      Spinal Exposure (Back)    Location: [facility] A OR  / [facility] OR    Surgeons: Jamie Farrell MD; Jamie Iverson, DO            Relevant Problems   Cardiovascular   (+) Benign hypertension   (+) Hypercholesterolemia   (+) Hyperlipidemia, unspecified      Endocrine   (+) Hyponatremia      GI   (+) GERD (gastroesophageal reflux disease)   (+) Gastroesophageal reflux disease without esophagitis      /Renal   (+) Chronic kidney disease   (+) Hypertensive kidney disease   (+) Stage 3a chronic kidney disease (CMS/HCC)      Neuro/Psych   (+) Anxiety   (+) GUANACO (generalized anxiety disorder)   (+) Right lumbar radiculopathy      Musculoskeletal   (+) DJD (degenerative joint disease), lumbar   (+) Lumbar spondylosis   (+) Primary localized osteoarthrosis of shoulder region   (+) Spondylosis of lumbar spine      Other   (+) Arthritis   (+) Arthritis of knee       Clinical information reviewed:   Tobacco  Allergies  Meds   Med Hx  Surg Hx   Fam Hx  Soc Hx        NPO Detail:  NPO/Void Status  Carbonhydrate Drink Given Prior to Surgery? : Y  Date of Last Liquid: 11/02/23  Time of Last Liquid: 0320  Date of Last Solid: 11/01/23  Time of Last Solid: 2130  Last Intake Type: Clear fluids; Carbohydrate drink  Time of Last Void: 0615         Physical Exam    Airway  Mallampati: I  TM distance: >3 FB  Neck ROM: full     Cardiovascular - normal exam     Dental - normal exam     Pulmonary - normal exam     Abdominal - normal exam             Anesthesia Plan    ASA 3     general     intravenous induction   Postoperative administration of opioids is intended.  Anesthetic plan and risks discussed with patient.  Use of blood products discussed with patient who.    Plan discussed with CRNA.

## 2023-11-02 NOTE — PROGRESS NOTES
Physical Therapy    Physical Therapy Evaluation & Treatment    Patient Name: Angel Jordan  MRN: [id number]  Today's Date: 11/2/2023   Time Calculation  Start Time: 1654  Stop Time: 1720  Time Calculation (min): 26 min    Assessment/Plan   PT Assessment  PT Assessment Results: Decreased strength, Decreased endurance, Impaired balance, Decreased mobility, Decreased safety awareness, Impaired sensation, Orthopedic restrictions, Pain  Rehab Prognosis: Good  Evaluation/Treatment Tolerance: Patient tolerated treatment well  Medical Staff Made Aware: Yes  Strengths: Ability to acquire knowledge, Attitude of self, Support of Caregivers, Rehab experience  Barriers to Participation: Insight into problems  End of Session Communication: Bedside nurse  Assessment Comment: PT eval complete. Needing no true hands on assist at this time. Cuing for form, sequencing, and breathing throughout, as well as education about precautions follow and anti-embolic therex. Needing continued PT for addressing functional deficits.  End of Session Patient Position: Bed, 3 rail up, Alarm on  IP OR SWING BED PT PLAN  Inpatient or Swing Bed: Inpatient  PT Plan  Treatment/Interventions: Bed mobility, Transfer training, Gait training, Stair training, Balance training, Neuromuscular re-education, Strengthening, Endurance training, Therapeutic exercise, Therapeutic activity, Home exercise program, Positioning  PT Plan: Skilled PT  PT Frequency: Daily  PT Discharge Recommendations: Low intensity level of continued care  PT Recommended Transfer Status: Stand by assist      Subjective     General Visit Information:  General  Reason for Referral: s/p Lumbar sx (L5-S1 Anterior Lumbar Fusion; Posterior Instrumentation, Spinal Exposure)  Referred By: Colten Otoole PA-C  Past Medical History Relevant to Rehab:   Past Medical History:   Diagnosis Date    CKD (chronic kidney disease)     8/30/23: creatinine 1.43, GFR 54    Difficult intravenous access      requiring use of ultrasound prior to surgery to obtain IV access    GERD (gastroesophageal reflux disease)     Gout     Hyperlipidemia     Hypertension     Hyponatremia     8/30/23: Na 133    Nasal polyp     Skin cancer     s/p excision     Past Surgical History:   Procedure Laterality Date    CARPAL TUNNEL RELEASE Right 2016    COLONOSCOPY  2011    HERNIA REPAIR Right 2014    inguinal    KNEE SURGERY Left 2010    Knee Surgery-MENISCUS    LASIK Bilateral     2001    ROTATOR CUFF REPAIR Left 2016    Rotator Cuff Repair    TONSILLECTOMY  1959    TOTAL KNEE ARTHROPLASTY Left     2017    TOTAL KNEE ARTHROPLASTY Right 2022    TOTAL SHOULDER ARTHROPLASTY Left 03/17/2023       Family/Caregiver Present: Yes  Caregiver Feedback: Spouse present, encouraging  Prior to Session Communication: Bedside nurse  Patient Position Received: Bed, 3 rail up, Alarm off, not on at start of session  Preferred Learning Style: verbal, visual, written, auditory  General Comment: Pt reports loving therapy, has had multiple TKR and TSR as well, very familiar with protocol, though novel to back sx.  Home Living:  Home Living  Type of Home: House  Lives With: Spouse  Home Adaptive Equipment: Walker rolling or standard, Cane  Home Layout: Able to live on main level with bedroom/bathroom, Stairs to alternate level with rails  Alternate Level Stairs-Rails:  (Single handrail)  Alternate Level Stairs-Number of Steps: 12 (to basement)  Home Access: Stairs to enter without rails  Entrance Stairs-Rails: None  Entrance Stairs-Number of Steps: 2 (Reports no steps to enter, but 2 from entrance to main level)  Bathroom Shower/Tub: Walk-in shower  Bathroom Toilet: Adaptive toilet seating  Bathroom Equipment: Shower chair with back, Raised toilet seat without rails (Vanity close by for propulsion assist)  Prior Level of Function:  Prior Function Per Pt/Caregiver Report  Level of Yell: Independent with ADLs and functional transfers  Receives Help From:  Family  ADL Assistance: Independent (but becoming more and more difficult)  Homemaking Assistance: Needs assistance (Spouse assists in IADLs)  Ambulatory Assistance: Independent (Reports ambulating /s device, last fall was down steps and almost a full year ago)  Hand Dominance: Right  Prior Function Comments: Reports MOD I with all ADLs, shares IADLs with spouse, and +  Precautions:  Precautions  Hearing/Visual Limitations: Reading glasses  Medical Precautions: Spinal precautions (Logroll)  Post-Surgical Precautions: Spinal precautions  Precautions Comment: Slightly impulsive, but does well with cuing and commands  Vital Signs:       Objective   Pain:  Pain Assessment  Pain Assessment: 0-10  Pain Score: 8  Pain Type: Surgical pain  Pain Location: Abdomen (as well as low back, and hypersensitivity to RLE)  Pain Descriptors: Cramping, Pins and needles  Pain Onset: Ongoing  Clinical Progression: Gradually worsening  Pain Interventions: Repositioned, Ambulation/increased activity, Distraction, Relaxation technique, Medication (See MAR)  Response to Interventions: Improved slightly, still waiting for pain meds to kick in, but less positional change  Cognition:  Cognition  Overall Cognitive Status: Within Functional Limits  Orientation Level: Oriented X4  Insight: Mild  Impulsive: Mildly    General Assessments:                Activity Tolerance  Endurance: Tolerates 10 - 20 min exercise with multiple rests    Sensation  Sensation Comment: Reports RLE has been numb for past year, now hypersensitive    Strength  Strength Comments: WFL for mobility           Coordination  Movements are Fluid and Coordinated: Yes    Postural Control  Postural Control: Within Functional Limits  Head Control: WFL  Trunk Control: WFL  Posture Comment: Very upright posture in sitting/standing, but noted very slight hip/knee flexion when standing for increased static time    Static Sitting Balance  Static Sitting-Balance Support: Feet  supported, No upper extremity supported  Static Sitting-Level of Assistance: Close supervision  Dynamic Sitting Balance  Dynamic Sitting-Balance Support: Feet supported, No upper extremity supported  Dynamic Sitting-Balance: Trunk control activities, Forward lean    Static Standing Balance  Static Standing-Balance Support: Bilateral upper extremity supported  Static Standing-Level of Assistance: Contact guard  Dynamic Standing Balance  Dynamic Standing-Balance Support: Bilateral upper extremity supported  Dynamic Standing-Balance: Turning  Functional Assessments:  Bed Mobility  Bed Mobility: Yes  Bed Mobility 1  Bed Mobility 1: Supine to sitting, Sitting to supine  Level of Assistance 1: Close supervision, Moderate verbal cues, Moderate tactile cues  Bed Mobility Comments 1: Logroll form /c good return demo once given clear verbal and tactile cuing    Transfers  Transfer: Yes  Transfer 1  Technique 1: Sit to stand, Stand to sit  Transfer Device 1: Walker  Transfer Level of Assistance 1: Contact guard, Moderate verbal cues, Moderate tactile cues  Trials/Comments 1: No true propulsive assist, no hands on assist needed. Tactile cuing for form and upright posture. Cues for breathing throughout as well as sequencing and form.    Ambulation/Gait Training  Ambulation/Gait Training Performed: Yes  Ambulation/Gait Training 1  Surface 1: Level tile  Device 1: Rolling walker  Assistance 1: Contact guard, Minimal verbal cues, Minimal tactile cues  Quality of Gait 1: Narrow base of support (Noted slight flexion at hip/knees BLE, increased flexion slowly grows with static standing, when moving, truly upright. Pt side stepping up to HOB. Decreased step length/height BLE, narrow KIANA, no LOB.)  Comments/Distance (ft) 1: 3'    Stairs  Stairs: No  Extremity/Trunk Assessments:  RUE   RUE : Within Functional Limits  LUE   LUE: Within Functional Limits  RLE   RLE : Within Functional Limits  LLE   LLE : Within Functional  Limits  Treatments:  Therapeutic Exercise  Therapeutic Exercise Performed: Yes  Therapeutic Exercise Activity 1: Verbal review of anti-embolic HEP, return demo x10 BLE    Therapeutic Activity  Therapeutic Activity Performed: Yes  Therapeutic Activity 1: Increased time educating pt and spouse about plan of care, precautions, participation with therapy, and plan of care.    Balance/Neuromuscular Re-Education  Balance/Neuromuscular Re-Education Activity Performed: Yes  Balance/Neuromuscular Re-Education Activity 1: Increased time sitting at EOB for static and dynamic trunk challenges.  Outcome Measures:  [facility] Basic Mobility  Turning from your back to your side while in a flat bed without using bedrails: A little  Moving from lying on your back to sitting on the side of a flat bed without using bedrails: A little  Moving to and from bed to chair (including a wheelchair): A little  Standing up from a chair using your arms (e.g. wheelchair or bedside chair): A little  To walk in hospital room: A little  Climbing 3-5 steps with railing: A little  Basic Mobility - Total Score: 18    Encounter Problems       Encounter Problems (Active)       Balance       STG - Maintains dynamic standing balance without upper extremity support       Start:  11/02/23    Expected End:  11/16/23       INTERVENTIONS:  1. Practice standing with minimal support.  2. Educate patient about standing tolerance.  3. Educate patient about independence with gait, transfers, and ADL's.  4. Educate patient about use of assistive device.  5. Educate patient about self-directed care.            Mobility       STG - Patient will ambulate       Start:  11/02/23    Expected End:  11/16/23       >/= 400', device PRN, Mod I, no LOB, Heel toe gait cycle         STG - Patient will ascend and descend four to six stairs       Start:  11/02/23    Expected End:  11/16/23       At MOD I level, safely, no LOB, no handrail                Safety       Goal 1       Start:   11/02/23    Expected End:  11/16/23       Pt will verbalize and apply safety awareness and precautions 100% of time throughout entire session                Transfers       STG - Patient will perform bed mobility       Start:  11/02/23    Expected End:  11/16/23       At MOD I level, safely, no LOB           STG - Patient will transfer sit to and from stand       Start:  11/02/23    Expected End:  11/16/23       At MOD I level, safely, no LOB           STG - Patient will perform car transfer       Start:  11/02/23    Expected End:  11/16/23       At MOD I level, safely, no LOB                  Education Documentation  Handouts, taught by Tacho Rdz, PT at 11/2/2023  5:49 PM.  Learner: Significant Other, Patient  Readiness: Eager  Method: Explanation, Demonstration, Handout  Response: Verbalizes Understanding, Needs Reinforcement    Precautions, taught by Tacho Rdz PT at 11/2/2023  5:49 PM.  Learner: Significant Other, Patient  Readiness: Eager  Method: Explanation, Demonstration, Handout  Response: Verbalizes Understanding, Needs Reinforcement    Body Mechanics, taught by Tacho Rdz PT at 11/2/2023  5:49 PM.  Learner: Significant Other, Patient  Readiness: Eager  Method: Explanation, Demonstration, Handout  Response: Verbalizes Understanding, Needs Reinforcement    Home Exercise Program, taught by Tacho Rdz PT at 11/2/2023  5:49 PM.  Learner: Significant Other, Patient  Readiness: Eager  Method: Explanation, Demonstration, Handout  Response: Verbalizes Understanding, Needs Reinforcement    Mobility Training, taught by Tacho Rdz PT at 11/2/2023  5:49 PM.  Learner: Significant Other, Patient  Readiness: Eager  Method: Explanation, Demonstration, Handout  Response: Verbalizes Understanding, Needs Reinforcement    Education Comments  No comments found.

## 2023-11-02 NOTE — ANESTHESIA PROCEDURE NOTES
Airway  Date/Time: 11/2/2023 7:57 AM  Urgency: elective    Airway not difficult    Staffing  Performed: CRNA   Authorized by: Alfonso Lawrence MD    Performed by: STACIE Gramajo-SUNNY  Patient location during procedure: OR    Indications and Patient Condition  Indications for airway management: anesthesia  Spontaneous Ventilation: absent  Sedation level: deep  Preoxygenated: yes  Patient position: sniffing  Mask difficulty assessment: 2 - vent by mask + OA or adjuvant +/- NMBA    Final Airway Details  Final airway type: endotracheal airway      Successful airway: ETT  Cuffed: yes   Successful intubation technique: video laryngoscopy  Facilitating devices/methods: intubating stylet and OPA  Endotracheal tube insertion site: oral  Blade: Porfirio  Blade size: #4  ETT size (mm): 7.5  Cormack-Lehane Classification: grade I - full view of glottis  Placement verified by: chest auscultation and capnometry   Inital cuff pressure (cm H2O): 23  Measured from: lips  Number of attempts at approach: 1    Additional Comments  Used Smarr

## 2023-11-03 ENCOUNTER — PHARMACY VISIT (OUTPATIENT)
Dept: PHARMACY | Facility: CLINIC | Age: 68
End: 2023-11-03
Payer: MEDICARE

## 2023-11-03 ENCOUNTER — HOME HEALTH ADMISSION (OUTPATIENT)
Dept: HOME HEALTH SERVICES | Facility: HOME HEALTH | Age: 68
End: 2023-11-03
Payer: COMMERCIAL

## 2023-11-03 VITALS
TEMPERATURE: 98.7 F | RESPIRATION RATE: 16 BRPM | DIASTOLIC BLOOD PRESSURE: 60 MMHG | OXYGEN SATURATION: 95 % | BODY MASS INDEX: 27.96 KG/M2 | HEART RATE: 56 BPM | WEIGHT: 178.13 LBS | HEIGHT: 67 IN | SYSTOLIC BLOOD PRESSURE: 116 MMHG

## 2023-11-03 PROBLEM — M43.10 ISTHMIC SPONDYLOLISTHESIS: Status: RESOLVED | Noted: 2023-10-10 | Resolved: 2023-11-03

## 2023-11-03 LAB
ANION GAP SERPL CALC-SCNC: 14 MMOL/L (ref 10–20)
BUN SERPL-MCNC: 20 MG/DL (ref 6–23)
CALCIUM SERPL-MCNC: 8.8 MG/DL (ref 8.6–10.3)
CHLORIDE SERPL-SCNC: 102 MMOL/L (ref 98–107)
CO2 SERPL-SCNC: 24 MMOL/L (ref 21–32)
CREAT SERPL-MCNC: 1.17 MG/DL (ref 0.5–1.3)
ERYTHROCYTE [DISTWIDTH] IN BLOOD BY AUTOMATED COUNT: 13.6 % (ref 11.5–14.5)
GFR SERPL CREATININE-BSD FRML MDRD: 69 ML/MIN/1.73M*2
GLUCOSE SERPL-MCNC: 127 MG/DL (ref 74–99)
HCT VFR BLD AUTO: 33.1 % (ref 41–52)
HGB BLD-MCNC: 10.8 G/DL (ref 13.5–17.5)
MCH RBC QN AUTO: 29.4 PG (ref 26–34)
MCHC RBC AUTO-ENTMCNC: 32.6 G/DL (ref 32–36)
MCV RBC AUTO: 90 FL (ref 80–100)
NRBC BLD-RTO: 0 /100 WBCS (ref 0–0)
PLATELET # BLD AUTO: 273 X10*3/UL (ref 150–450)
POTASSIUM SERPL-SCNC: 4.3 MMOL/L (ref 3.5–5.3)
RBC # BLD AUTO: 3.67 X10*6/UL (ref 4.5–5.9)
SODIUM SERPL-SCNC: 136 MMOL/L (ref 136–145)
WBC # BLD AUTO: 24 X10*3/UL (ref 4.4–11.3)

## 2023-11-03 PROCEDURE — 97535 SELF CARE MNGMENT TRAINING: CPT | Mod: GO

## 2023-11-03 PROCEDURE — 2500000004 HC RX 250 GENERAL PHARMACY W/ HCPCS (ALT 636 FOR OP/ED)

## 2023-11-03 PROCEDURE — RXMED WILLOW AMBULATORY MEDICATION CHARGE

## 2023-11-03 PROCEDURE — 80048 BASIC METABOLIC PNL TOTAL CA: CPT

## 2023-11-03 PROCEDURE — 97530 THERAPEUTIC ACTIVITIES: CPT | Mod: GO

## 2023-11-03 PROCEDURE — 97165 OT EVAL LOW COMPLEX 30 MIN: CPT | Mod: GO

## 2023-11-03 PROCEDURE — 85027 COMPLETE CBC AUTOMATED: CPT

## 2023-11-03 PROCEDURE — 36415 COLL VENOUS BLD VENIPUNCTURE: CPT

## 2023-11-03 PROCEDURE — 97116 GAIT TRAINING THERAPY: CPT | Mod: GP

## 2023-11-03 PROCEDURE — 2500000001 HC RX 250 WO HCPCS SELF ADMINISTERED DRUGS (ALT 637 FOR MEDICARE OP)

## 2023-11-03 RX ORDER — PREDNISONE 20 MG/1
TABLET ORAL
Qty: 30 TABLET | Refills: 0 | Status: SHIPPED | OUTPATIENT
Start: 2023-11-03 | End: 2023-11-14 | Stop reason: ALTCHOICE

## 2023-11-03 RX ORDER — KETOROLAC TROMETHAMINE 10 MG/1
10 TABLET, FILM COATED ORAL EVERY 6 HOURS PRN
Qty: 15 TABLET | Refills: 0 | Status: SHIPPED | OUTPATIENT
Start: 2023-11-03 | End: 2023-11-08

## 2023-11-03 RX ADMIN — DULOXETINE HYDROCHLORIDE 30 MG: 30 CAPSULE, DELAYED RELEASE ORAL at 08:15

## 2023-11-03 RX ADMIN — CEFAZOLIN SODIUM 2 G: 2 INJECTION, SOLUTION INTRAVENOUS at 00:59

## 2023-11-03 RX ADMIN — KETOROLAC TROMETHAMINE 15 MG: 30 INJECTION, SOLUTION INTRAMUSCULAR at 08:14

## 2023-11-03 RX ADMIN — HYDROMORPHONE HYDROCHLORIDE 0.2 MG: 0.2 INJECTION, SOLUTION INTRAMUSCULAR; INTRAVENOUS; SUBCUTANEOUS at 06:56

## 2023-11-03 RX ADMIN — ASPIRIN 81 MG: 81 TABLET, COATED ORAL at 08:15

## 2023-11-03 RX ADMIN — GABAPENTIN 600 MG: 300 CAPSULE ORAL at 08:15

## 2023-11-03 RX ADMIN — OXYCODONE HYDROCHLORIDE 10 MG: 5 TABLET ORAL at 03:53

## 2023-11-03 RX ADMIN — LORATADINE 10 MG: 10 TABLET ORAL at 08:15

## 2023-11-03 RX ADMIN — HYDROMORPHONE HYDROCHLORIDE 0.2 MG: 0.2 INJECTION, SOLUTION INTRAMUSCULAR; INTRAVENOUS; SUBCUTANEOUS at 01:00

## 2023-11-03 RX ADMIN — FAMOTIDINE 20 MG: 20 TABLET, FILM COATED ORAL at 08:15

## 2023-11-03 RX ADMIN — DEXAMETHASONE SODIUM PHOSPHATE 10 MG: 10 INJECTION, SOLUTION INTRAMUSCULAR; INTRAVENOUS at 06:00

## 2023-11-03 RX ADMIN — POLYETHYLENE GLYCOL 3350 17 G: 17 POWDER, FOR SOLUTION ORAL at 08:15

## 2023-11-03 RX ADMIN — OXYCODONE HYDROCHLORIDE 10 MG: 5 TABLET ORAL at 08:15

## 2023-11-03 SDOH — ECONOMIC STABILITY: INCOME INSECURITY: IN THE LAST 12 MONTHS, WAS THERE A TIME WHEN YOU WERE NOT ABLE TO PAY THE MORTGAGE OR RENT ON TIME?: NO

## 2023-11-03 SDOH — ECONOMIC STABILITY: INCOME INSECURITY: HOW HARD IS IT FOR YOU TO PAY FOR THE VERY BASICS LIKE FOOD, HOUSING, MEDICAL CARE, AND HEATING?: NOT HARD AT ALL

## 2023-11-03 SDOH — HEALTH STABILITY: MENTAL HEALTH: HOW OFTEN DO YOU HAVE 6 OR MORE DRINKS ON ONE OCCASION?: NEVER

## 2023-11-03 SDOH — SOCIAL STABILITY: SOCIAL INSECURITY
WITHIN THE LAST YEAR, HAVE YOU BEEN KICKED, HIT, SLAPPED, OR OTHERWISE PHYSICALLY HURT BY YOUR PARTNER OR EX-PARTNER?: NO

## 2023-11-03 SDOH — ECONOMIC STABILITY: HOUSING INSECURITY: IN THE LAST 12 MONTHS, HOW MANY PLACES HAVE YOU LIVED?: 1

## 2023-11-03 SDOH — SOCIAL STABILITY: SOCIAL INSECURITY
WITHIN THE LAST YEAR, HAVE TO BEEN RAPED OR FORCED TO HAVE ANY KIND OF SEXUAL ACTIVITY BY YOUR PARTNER OR EX-PARTNER?: NO

## 2023-11-03 SDOH — SOCIAL STABILITY: SOCIAL INSECURITY: WITHIN THE LAST YEAR, HAVE YOU BEEN HUMILIATED OR EMOTIONALLY ABUSED IN OTHER WAYS BY YOUR PARTNER OR EX-PARTNER?: NO

## 2023-11-03 SDOH — HEALTH STABILITY: MENTAL HEALTH: HOW OFTEN DO YOU HAVE A DRINK CONTAINING ALCOHOL?: NEVER

## 2023-11-03 SDOH — HEALTH STABILITY: MENTAL HEALTH: HOW MANY STANDARD DRINKS CONTAINING ALCOHOL DO YOU HAVE ON A TYPICAL DAY?: PATIENT DOES NOT DRINK

## 2023-11-03 SDOH — ECONOMIC STABILITY: INCOME INSECURITY: IN THE PAST 12 MONTHS, HAS THE ELECTRIC, GAS, OIL, OR WATER COMPANY THREATENED TO SHUT OFF SERVICE IN YOUR HOME?: NO

## 2023-11-03 SDOH — ECONOMIC STABILITY: FOOD INSECURITY: WITHIN THE PAST 12 MONTHS, THE FOOD YOU BOUGHT JUST DIDN'T LAST AND YOU DIDN'T HAVE MONEY TO GET MORE.: NEVER TRUE

## 2023-11-03 SDOH — HEALTH STABILITY: PHYSICAL HEALTH: ON AVERAGE, HOW MANY MINUTES DO YOU ENGAGE IN EXERCISE AT THIS LEVEL?: 0 MIN

## 2023-11-03 SDOH — SOCIAL STABILITY: SOCIAL NETWORK: ARE YOU MARRIED, WIDOWED, DIVORCED, SEPARATED, NEVER MARRIED, OR LIVING WITH A PARTNER?: MARRIED

## 2023-11-03 SDOH — ECONOMIC STABILITY: FOOD INSECURITY: WITHIN THE PAST 12 MONTHS, YOU WORRIED THAT YOUR FOOD WOULD RUN OUT BEFORE YOU GOT MONEY TO BUY MORE.: NEVER TRUE

## 2023-11-03 SDOH — HEALTH STABILITY: PHYSICAL HEALTH: ON AVERAGE, HOW MANY DAYS PER WEEK DO YOU ENGAGE IN MODERATE TO STRENUOUS EXERCISE (LIKE A BRISK WALK)?: 0 DAYS

## 2023-11-03 SDOH — SOCIAL STABILITY: SOCIAL INSECURITY: WITHIN THE LAST YEAR, HAVE YOU BEEN AFRAID OF YOUR PARTNER OR EX-PARTNER?: NO

## 2023-11-03 ASSESSMENT — COGNITIVE AND FUNCTIONAL STATUS - GENERAL
CLIMB 3 TO 5 STEPS WITH RAILING: A LITTLE
MOBILITY SCORE: 18
MOVING FROM LYING ON BACK TO SITTING ON SIDE OF FLAT BED WITH BEDRAILS: A LITTLE
PERSONAL GROOMING: A LITTLE
MOVING TO AND FROM BED TO CHAIR: A LITTLE
HELP NEEDED FOR BATHING: A LITTLE
DRESSING REGULAR LOWER BODY CLOTHING: A LITTLE
DAILY ACTIVITIY SCORE: 21
STANDING UP FROM CHAIR USING ARMS: A LITTLE
WALKING IN HOSPITAL ROOM: A LITTLE
TURNING FROM BACK TO SIDE WHILE IN FLAT BAD: A LITTLE

## 2023-11-03 ASSESSMENT — PAIN - FUNCTIONAL ASSESSMENT
PAIN_FUNCTIONAL_ASSESSMENT: 0-10

## 2023-11-03 ASSESSMENT — PAIN SCALES - GENERAL
PAINLEVEL_OUTOF10: 7
PAINLEVEL_OUTOF10: 7
PAINLEVEL_OUTOF10: 3
PAINLEVEL_OUTOF10: 7
PAINLEVEL_OUTOF10: 4
PAINLEVEL_OUTOF10: 8

## 2023-11-03 ASSESSMENT — ACTIVITIES OF DAILY LIVING (ADL)
ADL_ASSISTANCE: INDEPENDENT
HOME_MANAGEMENT_TIME_ENTRY: 20
LACK_OF_TRANSPORTATION: NO

## 2023-11-03 ASSESSMENT — LIFESTYLE VARIABLES
AUDIT-C TOTAL SCORE: 0
SKIP TO QUESTIONS 9-10: 1

## 2023-11-03 NOTE — HOSPITAL COURSE
Patient was taken to the OR on 11/2/2023 for L5-S1 anterior lumbar fusion with posterior instrumentation. Procedure went as planned and without complications and patient was transferred to PACU and in stable condition was transferred to the floor. Patient has been working with PT and OT and improving. On discharge date patient was cleared by PT and orthopedic team and was determined safe for discharge. Daily discussion was had with the patient, nursing staff, orthopedic team, and family members if present. All questions were answered to the patient's satisfaction.

## 2023-11-03 NOTE — DISCHARGE SUMMARY
Discharge Diagnosis  Isthmic spondylolisthesis  Status post lumbar fusion by anterior technique    Issues Requiring Follow-Up  Patient should follow up at scheduled 2-3 week post up visit unless:  Uncontrolled bleeding from incision site  Intractable pain in extremities  Signs and symptoms of infection  Inability to urinate/have a bowel movement      Test Results Pending At Discharge  Pending Labs       No current pending labs.            Hospital Course  Patient was taken to the OR on 11/2/2023 for L5-S1 anterior lumbar fusion with posterior instrumentation. Procedure went as planned and without complications and patient was transferred to PACU and in stable condition was transferred to the floor. Patient has been working with PT and OT and improving. On discharge date patient was cleared by PT and orthopedic team and was determined safe for discharge. Daily discussion was had with the patient, nursing staff, orthopedic team, and family members if present. All questions were answered to the patient's satisfaction.     Pertinent Physical Exam At Time of Discharge  Physical Exam  Patient sitting upright in the chair in arrival.  Full strength and range of motion of lower extremities.  Mild apprehension to straight leg raise on right, mainly in posterior thigh.  Ambulating as tolerated.    Home Medications     Medication List      START taking these medications     acetaminophen 500 mg tablet; Commonly known as: Tylenol; Take 2 tablets   (1,000 mg) by mouth every 6 hours if needed for mild pain (1 - 3) for up   to 7 days.   cyclobenzaprine 10 mg tablet; Commonly known as: Flexeril; Take 1 tablet   (10 mg) by mouth 3 times a day as needed for muscle spasms for up to 10   days.   docusate sodium 100 mg capsule; Commonly known as: Colace; Take 1   capsule (100 mg) by mouth 3 times a day as needed for constipation for up   to 7 days.   ketorolac 10 mg tablet; Commonly known as: Toradol; Take 1 tablet (10   mg) by mouth  every 6 hours if needed for moderate pain (4 - 6) for up to 5   days.   oxyCODONE 5 mg immediate release tablet; Commonly known as: Roxicodone;   Take 1 tablet (5 mg) by mouth every 6 hours if needed for severe pain (7 -   10) for up to 7 days.   predniSONE 20 mg tablet; Commonly known as: Deltasone; Take 1 tablet (20   mg) by mouth 3 times a day for 5 days, THEN 1 tablet (20 mg) 2 times a day   for 5 days, THEN 1 tablet (20 mg) once daily for 5 days.; Start taking on:   November 3, 2023     CHANGE how you take these medications     fluticasone 50 mcg/actuation nasal spray; Commonly known as: Flonase;   Administer 1 spray into each nostril 2 times a day for 7 days. Shake   gently. Before first use, prime pump. After use, clean tip and replace   cap.; What changed: when to take this, reasons to take this     CONTINUE taking these medications     albuterol 90 mcg/actuation inhaler   Allegra Allergy 180 mg tablet; Generic drug: fexofenadine   aspirin 81 mg EC tablet   atorvastatin 20 mg tablet; Commonly known as: Lipitor; TAKE 1 TABLET (20   MG) BY MOUTH ONCE DAILY AT BEDTIME.   b complex 0.4 mg tablet   Benadryl Allergy 25 mg tablet; Generic drug: diphenhydrAMINE   DULoxetine 30 mg DR capsule; Commonly known as: Cymbalta   famotidine 20 mg tablet; Commonly known as: Pepcid   gabapentin 300 mg capsule; Commonly known as: Neurontin   lisinopril 10 mg tablet; TAKE 1 TABLET BY MOUTH EVERY DAY   metoprolol succinate XL 25 mg 24 hr tablet; Commonly known as:   Toprol-XL; Take 1 tablet (25 mg) by mouth once daily. Do not crush or   chew.   multivitamin tablet     STOP taking these medications     chlorhexidine 0.12 % solution; Commonly known as: Peridex       Outpatient Follow-Up  Follow-up in 2 to 3 weeks with Dr. Ribera/AROLDO Campos PA-C  12:36 PM  11/03/23

## 2023-11-03 NOTE — PROGRESS NOTES
"Angel Jordan is a 68 y.o. male on day 1 of admission presenting with Isthmic spondylolisthesis.    Subjective   Patient was seen today on rounds and is doing well.  He reports some right posterior leg pain that is positional.  He states the pain is sharp when he moves in certain positions.  He states he is ambulating well.  He has localized an expected surgical site pain.  He denies numbness and tingling in his legs.  He denies other radicular symptoms besides his right posterior leg pain.  He is ready for discharge home today with home care physical therapy.  No other questions or concerns at time of visit.       Objective     Physical Exam  Patient sitting up in chair on arrival.  Full strength and range of motion of lower extremities bilaterally.  Mild discomfort to straightening of right leg on exam.  Patient ambulating well.    Last Recorded Vitals  Blood pressure 116/60, pulse 56, temperature 37.1 °C (98.7 °F), temperature source Oral, resp. rate 16, height 1.702 m (5' 7\"), weight 80.8 kg (178 lb 2.1 oz), SpO2 95 %.  Intake/Output last 3 Shifts:  I/O last 3 completed shifts:  In: 3160 (39.1 mL/kg) [P.O.:1440; I.V.:20 (0.2 mL/kg); IV Piggyback:1700]  Out: 2250 (27.8 mL/kg) [Urine:2250 (0.8 mL/kg/hr)]  Weight: 80.8 kg     Relevant Results      Scheduled medications  aspirin, 81 mg, oral, Daily  atorvastatin, 20 mg, oral, Nightly  DULoxetine, 30 mg, oral, Daily  famotidine, 20 mg, oral, BID  gabapentin, 600 mg, oral, BID  lisinopril, 10 mg, oral, Daily  loratadine, 10 mg, oral, Daily  metoprolol succinate XL, 25 mg, oral, Daily  oxygen, , inhalation, Continuous - 02/gases  polyethylene glycol, 17 g, oral, Daily  scopolamine, 1 patch, transdermal, q72h      Continuous medications  lactated Ringer's, 100 mL/hr      PRN medications  PRN medications: albuterol, diazePAM, diphenhydrAMINE, HYDROmorphone, ketorolac, naloxone, ondansetron **OR** ondansetron, oxyCODONE, oxyCODONE  Results for orders placed or performed " during the hospital encounter of 11/02/23 (from the past 24 hour(s))   CBC   Result Value Ref Range    WBC 24.0 (H) 4.4 - 11.3 x10*3/uL    nRBC 0.0 0.0 - 0.0 /100 WBCs    RBC 3.67 (L) 4.50 - 5.90 x10*6/uL    Hemoglobin 10.8 (L) 13.5 - 17.5 g/dL    Hematocrit 33.1 (L) 41.0 - 52.0 %    MCV 90 80 - 100 fL    MCH 29.4 26.0 - 34.0 pg    MCHC 32.6 32.0 - 36.0 g/dL    RDW 13.6 11.5 - 14.5 %    Platelets 273 150 - 450 x10*3/uL   Basic metabolic panel   Result Value Ref Range    Glucose 127 (H) 74 - 99 mg/dL    Sodium 136 136 - 145 mmol/L    Potassium 4.3 3.5 - 5.3 mmol/L    Chloride 102 98 - 107 mmol/L    Bicarbonate 24 21 - 32 mmol/L    Anion Gap 14 10 - 20 mmol/L    Urea Nitrogen 20 6 - 23 mg/dL    Creatinine 1.17 0.50 - 1.30 mg/dL    eGFR 69 >60 mL/min/1.73m*2    Calcium 8.8 8.6 - 10.3 mg/dL                            Assessment/Plan   Principal Problem:    Isthmic spondylolisthesis    Discharge home today with home care physical therapy and Occupational Therapy  Pain control and antibiotics  DVT prophylaxis  Follow-up in 2 to 3 weeks           Colten Otoole PA-C  12:26 PM  11/03/23

## 2023-11-03 NOTE — PROGRESS NOTES
11/03/23 1019   Discharge Planning   Living Arrangements Spouse/significant other   Support Systems Spouse/significant other   Assistance Needed none   Type of Residence Private residence   Number of Stairs to Enter Residence 0   Number of Stairs Within Residence 2   Do you have animals or pets at home? Yes   Type of Animals or Pets one dog   Who is requesting discharge planning? Patient   Home or Post Acute Services In home services   Type of Home Care Services Home PT   Patient expects to be discharged to: home   Does the patient need discharge transport arranged? No   Financial Resource Strain   How hard is it for you to pay for the very basics like food, housing, medical care, and heating? Not hard   Housing Stability   In the last 12 months, was there a time when you were not able to pay the mortgage or rent on time? N   In the last 12 months, how many places have you lived? 1   In the last 12 months, was there a time when you did not have a steady place to sleep or slept in a shelter (including now)? N   Transportation Needs   In the past 12 months, has lack of transportation kept you from medical appointments or from getting medications? no   In the past 12 months, has lack of transportation kept you from meetings, work, or from getting things needed for daily living? No   Patient Choice   Provider Choice list and CMS website (https://medicare.gov/care-compare#search) for post-acute Quality and Resource Measure Data were provided and reviewed with: Patient   Patient / Family choosing to utilize agency / facility established prior to hospitalization Yes     11/3/23 1020  Met with patient at bedside to discuss discharge planning.  Patient lives at home with his wife in a house.  He is independent with ADLs and drives at baseline.  He does not use any assistive devices for ambulation.  He plans on returning home at discharge.  He is still having a lot of pain in his right leg that is new post op.  He is  unsure if he will be able to go home today or tomorrow.  He is agreeable to [facility].   Unable to pull a [facility] list based on his insurance ([employer]).  He states he had C with his last surgery with the same insurance but does not know the name of the agency.  Per past records, it appears he had [facility].  Message sent to intake to verify that [facility] is in network with his insurance.   Yuliya Nicholas RN [facility]

## 2023-11-03 NOTE — PROGRESS NOTES
Occupational Therapy    Evaluation/Treatment    Patient Name: Angel Jordan  MRN: [id number]  : 1955  Today's Date: 23  Time Calculation  Start Time: 1025  Stop Time: 1110  Time Calculation (min): 45 min       Assessment:  OT Assessment: Pt admitted s/p lumbar sx. Pt is limited by R thigh pain, dec functional mobility and ADLs due to new spinal precautions.  Prognosis: Good  Barriers to Discharge: None  Evaluation/Treatment Tolerance: Patient tolerated treatment well  End of Session Communication: Bedside nurse  End of Session Patient Position: Up in chair, Alarm on  OT Assessment Results: Decreased ADL status, Decreased IADLs, Decreased functional mobility  Prognosis: Good  Barriers to Discharge: None  Evaluation/Treatment Tolerance: Patient tolerated treatment well  Plan:  Treatment Interventions: ADL retraining, Functional transfer training, Endurance training, Patient/family training, Equipment evaluation/education, Neuromuscular reeducation  OT Frequency: 3 times per week  OT Discharge Recommendations: Low intensity level of continued care  Equipment Recommended upon Discharge:  (sock aid- discussed purchasing options)  OT Recommended Transfer Status: Stand by assist, Assistive equipment (Comment) (FWW)  OT - OK to Discharge: Yes (OT POC completed this date)  Treatment Interventions: ADL retraining, Functional transfer training, Endurance training, Patient/family training, Equipment evaluation/education, Neuromuscular reeducation    Subjective   Current Problem:  1. Isthmic spondylolisthesis        2. Radiculopathy of lumbar region  acetaminophen (Tylenol) 500 mg tablet    oxyCODONE (Roxicodone) 5 mg immediate release tablet    cyclobenzaprine (Flexeril) 10 mg tablet    docusate sodium (Colace) 100 mg capsule      3. Lumbar spondylosis  FL less than 1 hour    FL less than 1 hour      4. Spondylolisthesis, site unspecified [M43.10]          General:   OT Received On: 23  General  Reason for  Referral: s/p Lumbar sx  Referred By: Colten Otoole PA-C  Family/Caregiver Present: No  Prior to Session Communication: Bedside nurse  Patient Position Received: Bed, 3 rail up, Alarm on  Precautions:  Medical Precautions: Fall precautions  Post-Surgical Precautions: Spinal precautions  Vital Signs:     Pain:  Pain Assessment  Pain Assessment: 0-10  Pain Score: 4  Pain Location: Leg  Pain Interventions: Repositioned    Objective   Cognition:  Overall Cognitive Status: Within Functional Limits  Orientation Level: Oriented X4  Attention: Within Functional Limits  Safety/Judgement: Exceptions to WFL  Impulsive: Mildly           Home Living:  Type of Home: House  Lives With: Spouse  Home Adaptive Equipment: Walker rolling or standard, Cane  Home Layout: Able to live on main level with bedroom/bathroom, Stairs to alternate level with rails  Alternate Level Stairs-Number of Steps: 12  Home Access: Stairs to enter without rails  Entrance Stairs-Number of Steps: 2  Bathroom Shower/Tub: Walk-in shower  Bathroom Toilet: Adaptive toilet seating  Bathroom Equipment: Shower chair with back, Raised toilet seat without rails  Prior Function:  Level of [place]: Independent with ADLs and functional transfers  Receives Help From: Family  ADL Assistance: Independent  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, some walking, mostly computer work))  Hand Dominance: Right  Prior Function Comments: Reports MOD I with all ADLs, shares IADLs with spouse, and +  IADL History:  Current License: Yes  Mode of Transportation: Car  ADL:  Toileting Assistance with Device: Modified independent  Toileting Deficit: Grab bar use  Activities of Daily Living:      Grooming  Grooming Level of Assistance: Distant supervision, Minimal verbal cues  Grooming Where Assessed: Standing sinkside  Grooming Comments: Pt performed oral hygiene and hand hygiene at sink with therapist cues for FWW placement and maintaining  precautions      LE Dressing  LE Dressing: Yes  Pants Level of Assistance: Close supervision, Setup, Moderate verbal cues  Sock Level of Assistance: Setup, Minimum assistance, Moderate verbal cues  LE Dressing Where Assessed: Chair  LE Dressing Comments: Pt donned underwear/pants in supported sitting/standing utilizing reacher with increased time and effort along with therapist cues for AE use. Pt donned socks utilizing sock aid with initial therapist demo for technique       Activity Tolerance:  Endurance: Tolerates 10 - 20 min exercise with multiple rests     Bed Mobility/Transfers: Bed Mobility  Bed Mobility: Yes  Bed Mobility 1  Bed Mobility 1: Log roll  Level of Assistance 1: Close supervision, Minimal verbal cues, Minimal tactile cues  Bed Mobility Comments 1: Pt required min VC for logroll technique    Transfers  Transfer: Yes  Transfer 1  Transfer From 1: Sit to  Transfer to 1: Stand  Transfer Device 1: Walker  Transfer Level of Assistance 1: Distant supervision, Minimal verbal cues  Transfers 2  Transfer From 2: Stand to  Transfer to 2: Sit  Transfer Device 2: Walker  Transfer Level of Assistance 2: Distant supervision, Minimal verbal cues, Minimal tactile cues  Trials/Comments 2: Pt required mod VC t/o session for hand placement during descent      Ambulation/Gait Training:  Ambulation/Gait Training  Ambulation/Gait Training Performed: Yes  Ambulation/Gait Training 1  Surface 1: Level tile  Device 1: Rolling walker  Assistance 1: Distant supervision  Comments/Distance (ft) 1: Pt ambulated household distance  Sitting Balance:  Static Sitting Balance  Static Sitting-Balance Support: No upper extremity supported  Static Sitting-Level of Assistance: Independent  Dynamic Sitting Balance  Dynamic Sitting-Balance Support: No upper extremity supported  Dynamic Sitting-Comments: SBA  Standing Balance:  Static Standing Balance  Static Standing-Balance Support: Bilateral upper extremity supported  Static  Standing-Level of Assistance: Distant supervision  Dynamic Standing Balance  Dynamic Standing-Balance Support: Bilateral upper extremity supported  Dynamic Standing-Comments:  (SBA)       Vision:Vision - Basic Assessment  Current Vision: No visual deficits       Extremities: RUE   RUE : Within Functional Limits, LUE   LUE: Within Functional Limits, RLE   RLE : Within Functional Limits, and LLE   LLE : Within Functional Limits      Outcome Measures: [facility] Daily Activity  Putting on and taking off regular lower body clothing: A little  Bathing (including washing, rinsing, drying): A little  Putting on and taking off regular upper body clothing: None  Toileting, which includes using toilet, bedpan or urinal: None  Taking care of personal grooming such as brushing teeth: A little  Eating Meals: None  Daily Activity - Total Score: 21        Education Documentation  Handouts, taught by Niki Garrison OT at 11/3/2023 11:41 AM.  Learner: Patient  Readiness: Acceptance  Method: Explanation  Response: Verbalizes Understanding    Precautions, taught by Niki Garrison OT at 11/3/2023 11:41 AM.  Learner: Patient  Readiness: Acceptance  Method: Explanation  Response: Verbalizes Understanding    ADL Training, taught by Niki Garrison OT at 11/3/2023 11:41 AM.  Learner: Patient  Readiness: Acceptance  Method: Explanation  Response: Verbalizes Understanding    Education Comments  No comments found.           Goals:  Encounter Problems       Encounter Problems (Active)       ADLs       Patient will perform UB and LB bathing  with modified independent level of assistance and shower chair. (Progressing)       Start:  11/03/23    Expected End:  11/10/23            Patient with complete lower body dressing with modified independent level of assistance donning and doffing all LE clothes  with PRN adaptive equipment while supported sitting, edge of bed , and standing (Progressing)       Start:  11/03/23    Expected End:  11/10/23             Patient will complete daily grooming tasks brushing teeth, shaving, and washing face/hair with independent level of assistance and PRN adaptive equipment while standing. (Progressing)       Start:  11/03/23    Expected End:  11/10/23                          COGNITION/SAFETY       Patient will recall and adhere to spine precautions during all functional mobility/ADL tasks in order to demonstrate improved understanding and promote healing post op (Progressing)       Start:  11/03/23    Expected End:  11/10/23                   TRANSFERS       Patient will perform bed mobility modified independent level of assistance in order to improve safety and independence with mobility (Progressing)       Start:  11/03/23    Expected End:  11/10/23            Patient will complete functional transfers with least restrictive device with modified independent level of assistance. (Progressing)       Start:  11/03/23    Expected End:  11/10/23

## 2023-11-03 NOTE — NURSING NOTE

## 2023-11-03 NOTE — PROGRESS NOTES
Physical Therapy    Physical Therapy Treatment    Patient Name: Angel Jordan  MRN: [id number]  Today's Date: 11/3/2023  Time Calculation  Start Time: 1113  Stop Time: 1123  Time Calculation (min): 10 min       Assessment/Plan   PT Assessment  PT Assessment Results: Decreased strength, Decreased endurance, Impaired balance, Decreased mobility, Decreased safety awareness, Impaired sensation, Orthopedic restrictions, Pain  Rehab Prognosis: Good  Evaluation/Treatment Tolerance: Patient tolerated treatment well  Medical Staff Made Aware: Yes  Strengths: Ability to acquire knowledge, Attitude of self, Support of Caregivers, Rehab experience  Barriers to Participation: Insight into problems  End of Session Communication: Bedside nurse  Assessment Comment: PT tx completed. No hands on assist needed, performing increased gait and stair navigation in prep for home going, likely today.  End of Session Patient Position: Up in chair, Alarm on  PT Plan  Inpatient/Swing Bed or Outpatient: Inpatient  PT Plan  Treatment/Interventions: Bed mobility, Transfer training, Gait training, Stair training, Balance training, Neuromuscular re-education, Strengthening, Endurance training, Therapeutic exercise, Therapeutic activity, Home exercise program, Positioning  PT Plan: Skilled PT  PT Frequency: Daily  PT Discharge Recommendations: Low intensity level of continued care  PT Recommended Transfer Status: Stand by assist  PT - OK to Discharge: Yes      General Visit Information:   PT  Visit  PT Received On: 11/03/23  Response to Previous Treatment: Patient with no complaints from previous session.  General  Prior to Session Communication: Bedside nurse  Patient Position Received: Up in chair, Alarm on    Subjective   Precautions:  Precautions  Hearing/Visual Limitations: Reading glasses  Medical Precautions: Fall precautions  Post-Surgical Precautions: Spinal precautions  Precautions Comment: Slightly impulsive, but does well with cuing and  "commands  Vital Signs:       Objective   Pain:     Cognition:  Cognition  Overall Cognitive Status: Within Functional Limits  Orientation Level: Oriented X4  Insight: Mild  Impulsive: Mildly  Postural Control:  Postural Control  Postural Control: Within Functional Limits  Head Control: WFL  Trunk Control: WFL    Treatments:  Bed Mobility  Bed Mobility: No (Encountered up in chair, left in same as per pt request)  Bed Mobility 1  Bed Mobility 1: Supine to sitting, Sitting to supine  Level of Assistance 1: Close supervision, Moderate verbal cues, Moderate tactile cues  Bed Mobility Comments 1: Logroll form /c good return demo once given clear verbal and tactile cuing    Ambulation/Gait Training  Ambulation/Gait Training Performed: Yes  Ambulation/Gait Training 1  Surface 1: Level tile  Device 1: Rolling walker  Assistance 1: Close supervision  Quality of Gait 1: Narrow base of support (No hands on assist needed, no gross gait deviations. Occasional standing rest break with patient \"trying out\" his knees with knee flexion.)  Comments/Distance (ft) 1: 200'  Transfers  Transfer: Yes  Transfer 1  Technique 1: Sit to stand, Stand to sit  Transfer Device 1: Walker  Transfer Level of Assistance 1: Distant supervision  Trials/Comments 1: No true propulsive assist, no hands on assist needed. Tactile cuing for form and upright posture. Cues for breathing throughout as well as sequencing and form.    Stairs  Stairs: Yes  Stairs  Rails 1: Bilateral  Assistance 1: Close supervision, Moderate verbal cues  Comment/Number of Steps 1: Education on non-recipricating step navigation, good return demo.    Outcome Measures:  [facility] Basic Mobility  Turning from your back to your side while in a flat bed without using bedrails: A little  Moving from lying on your back to sitting on the side of a flat bed without using bedrails: A little  Moving to and from bed to chair (including a wheelchair): A little  Standing up from a chair using your " arms (e.g. wheelchair or bedside chair): A little  To walk in hospital room: A little  Climbing 3-5 steps with railing: A little  Basic Mobility - Total Score: 18    Education Documentation  Handouts, taught by Tacho Rdz PT at 11/3/2023  1:36 PM.  Learner: Patient  Readiness: Eager  Method: Explanation, Demonstration  Response: Needs Reinforcement, Verbalizes Understanding    Precautions, taught by Tacho Rdz PT at 11/3/2023  1:36 PM.  Learner: Patient  Readiness: Eager  Method: Explanation, Demonstration  Response: Needs Reinforcement, Verbalizes Understanding    Body Mechanics, taught by Tacho Rdz PT at 11/3/2023  1:36 PM.  Learner: Patient  Readiness: Eager  Method: Explanation, Demonstration  Response: Needs Reinforcement, Verbalizes Understanding    Home Exercise Program, taught by Tacho Rdz PT at 11/3/2023  1:36 PM.  Learner: Patient  Readiness: Eager  Method: Explanation, Demonstration  Response: Needs Reinforcement, Verbalizes Understanding    Mobility Training, taught by Tacho Rdz PT at 11/3/2023  1:36 PM.  Learner: Patient  Readiness: Eager  Method: Explanation, Demonstration  Response: Needs Reinforcement, Verbalizes Understanding    Education Comments  No comments found.        OP EDUCATION:       Encounter Problems       Encounter Problems (Active)       Balance       STG - Maintains dynamic standing balance without upper extremity support (Progressing)       Start:  11/02/23    Expected End:  11/16/23       INTERVENTIONS:  1. Practice standing with minimal support.  2. Educate patient about standing tolerance.  3. Educate patient about independence with gait, transfers, and ADL's.  4. Educate patient about use of assistive device.  5. Educate patient about self-directed care.            Mobility       STG - Patient will ambulate (Progressing)       Start:  11/02/23    Expected End:  11/16/23       >/= 400', device PRN, Mod I, no LOB, Heel toe gait  cycle         STG - Patient will ascend and descend four to six stairs (Progressing)       Start:  11/02/23    Expected End:  11/16/23       At MOD I level, safely, no LOB, no handrail                Pain - Adult          Safety       Goal 1 (Progressing)       Start:  11/02/23    Expected End:  11/16/23       Pt will verbalize and apply safety awareness and precautions 100% of time throughout entire session                Transfers       STG - Patient will perform bed mobility (Progressing)       Start:  11/02/23    Expected End:  11/16/23       At MOD I level, safely, no LOB           STG - Patient will transfer sit to and from stand (Progressing)       Start:  11/02/23    Expected End:  11/16/23       At MOD I level, safely, no LOB           STG - Patient will perform car transfer (Progressing)       Start:  11/02/23    Expected End:  11/16/23       At MOD I level, safely, no LOB

## 2023-11-06 ENCOUNTER — HOME CARE VISIT (OUTPATIENT)
Dept: HOME HEALTH SERVICES | Facility: HOME HEALTH | Age: 68
End: 2023-11-06
Payer: COMMERCIAL

## 2023-11-06 VITALS
SYSTOLIC BLOOD PRESSURE: 180 MMHG | TEMPERATURE: 97.4 F | DIASTOLIC BLOOD PRESSURE: 90 MMHG | HEART RATE: 62 BPM | RESPIRATION RATE: 18 BRPM

## 2023-11-06 PROCEDURE — G0151 HHCP-SERV OF PT,EA 15 MIN: HCPCS

## 2023-11-06 PROCEDURE — 0023 HH SOC

## 2023-11-06 PROCEDURE — 169592 NO-PAY CLAIM PROCEDURE

## 2023-11-06 SDOH — HEALTH STABILITY: PHYSICAL HEALTH: EXERCISE TYPE: LE THER EX

## 2023-11-06 ASSESSMENT — BALANCE ASSESSMENTS
SITTING BALANCE: 1 - STEADY, SAFE
BALANCE SCORE: 13
NUDGED: 2 - STEADY
SITTING DOWN: 1 - USES ARMS OR NOT SMOOTH MOTION
EYES CLOSED AT MAXIMUM POSITION NUDGED: 0 - UNSTEADY
ATTEMPTS TO ARISE: 2 - ABLE TO RISE, ONE ATTEMPT
IMMEDIATE STANDING BALANCE FIRST 5 SECONDS: 2 - STEADY WITHOUT WALKER OR OTHER SUPPORT
NUDGED SCORE: 2
TURNING 360 DEGREES STEPS: 1 - CONTINUOUS STEPS
ARISING SCORE: 1
STANDING BALANCE: 2 - NARROW STANCE WITHOUT SUPPORT
ARISES: 1 - ABLE, USES ARMS TO HELP

## 2023-11-06 ASSESSMENT — GAIT ASSESSMENTS
GAIT SCORE: 12
STEP CONTINUITY: 1 - STEPS APPEAR CONTINUOUS
TRUNK SCORE: 2
TRUNK: 2 - NO SWAY, NO FLEXION, NO USE OF ARMS, NO WALKING AID
BALANCE AND GAIT SCORE: 25
INITIATION OF GAIT IMMEDIATELY AFTER GO: 1 - NO HESITANCY
WALKING STANCE: 1 - HEELS ALMOST TOUCHING WHILE WALKING
PATH SCORE: 2
STEP SYMMETRY: 1 - RIGHT AND LEFT STEP LENGTH APPEAR EQUAL
PATH: 2 - STRAIGHT WITHOUT WALKING AID

## 2023-11-06 ASSESSMENT — ACTIVITIES OF DAILY LIVING (ADL)
PHYSICAL TRANSFERS ASSESSED: 1
AMBULATION_DISTANCE/DURATION_TOLERATED: 100 FT
AMBULATION ASSISTANCE: 1
OASIS_M1830: 03
AMBULATION ASSISTANCE ON FLAT SURFACES: 1
CURRENT_FUNCTION: SUPERVISION
ENTERING_EXITING_HOME: SUPERVISION
AMBULATION ASSISTANCE: SUPERVISION

## 2023-11-06 ASSESSMENT — ENCOUNTER SYMPTOMS
MUSCLE WEAKNESS: 1
HYPERTENSION: 1
PAIN LOCATION: BACK
PAIN SEVERITY GOAL: 0/10
HIGHEST PAIN SEVERITY IN PAST 24 HOURS: 9/10
LOWEST PAIN SEVERITY IN PAST 24 HOURS: 1/10
PAIN LOCATION - PAIN SEVERITY: 2/10
PAIN: 1
PERSON REPORTING PAIN: PATIENT
OCCASIONAL FEELINGS OF UNSTEADINESS: 0
SUBJECTIVE PAIN PROGRESSION: UNCHANGED

## 2023-11-07 ENCOUNTER — HOME CARE VISIT (OUTPATIENT)
Dept: HOME HEALTH SERVICES | Facility: HOME HEALTH | Age: 68
End: 2023-11-07
Payer: COMMERCIAL

## 2023-11-07 VITALS
RESPIRATION RATE: 18 BRPM | TEMPERATURE: 96.3 F | OXYGEN SATURATION: 100 % | DIASTOLIC BLOOD PRESSURE: 78 MMHG | SYSTOLIC BLOOD PRESSURE: 130 MMHG | HEART RATE: 57 BPM

## 2023-11-07 DIAGNOSIS — M54.16 LUMBAR RADICULOPATHY: Primary | ICD-10-CM

## 2023-11-07 DIAGNOSIS — M54.16 RADICULOPATHY, LUMBAR REGION: ICD-10-CM

## 2023-11-07 PROCEDURE — G0152 HHCP-SERV OF OT,EA 15 MIN: HCPCS

## 2023-11-07 RX ORDER — GABAPENTIN 300 MG/1
600 CAPSULE ORAL 2 TIMES DAILY
Qty: 120 CAPSULE | Refills: 3 | OUTPATIENT
Start: 2023-11-07

## 2023-11-07 ASSESSMENT — PAIN SCALES - PAIN ASSESSMENT IN ADVANCED DEMENTIA (PAINAD)
FACIALEXPRESSION: 0 - SMILING OR INEXPRESSIVE.
BREATHING: 0
TOTALSCORE: 0
CONSOLABILITY: 0 - NO NEED TO CONSOLE.
NEGVOCALIZATION: 0
BODYLANGUAGE: 0
NEGVOCALIZATION: 0 - NONE.
FACIALEXPRESSION: 0
BODYLANGUAGE: 0 - RELAXED.
CONSOLABILITY: 0

## 2023-11-07 ASSESSMENT — ENCOUNTER SYMPTOMS
PAIN SEVERITY GOAL: 0/10
HIGHEST PAIN SEVERITY IN PAST 24 HOURS: 8/10
LOWEST PAIN SEVERITY IN PAST 24 HOURS: 4/10
SUBJECTIVE PAIN PROGRESSION: GRADUALLY IMPROVING
PAIN: 1

## 2023-11-07 ASSESSMENT — ACTIVITIES OF DAILY LIVING (ADL)
BATHING ASSESSED: 1
DRESSING_LB_CURRENT_FUNCTION: INDEPENDENT
BATHING_CURRENT_FUNCTION: INDEPENDENT

## 2023-11-08 RX ORDER — GABAPENTIN 300 MG/1
600 CAPSULE ORAL 2 TIMES DAILY
Qty: 360 CAPSULE | Refills: 3 | Status: SHIPPED | OUTPATIENT
Start: 2023-11-08 | End: 2023-12-05 | Stop reason: SDUPTHER

## 2023-11-09 ENCOUNTER — HOME CARE VISIT (OUTPATIENT)
Dept: HOME HEALTH SERVICES | Facility: HOME HEALTH | Age: 68
End: 2023-11-09
Payer: COMMERCIAL

## 2023-11-09 VITALS
HEART RATE: 68 BPM | SYSTOLIC BLOOD PRESSURE: 150 MMHG | RESPIRATION RATE: 18 BRPM | DIASTOLIC BLOOD PRESSURE: 92 MMHG | TEMPERATURE: 98 F

## 2023-11-09 DIAGNOSIS — M54.16 RADICULOPATHY OF LUMBAR REGION: ICD-10-CM

## 2023-11-09 PROCEDURE — G0151 HHCP-SERV OF PT,EA 15 MIN: HCPCS

## 2023-11-09 SDOH — HEALTH STABILITY: PHYSICAL HEALTH: EXERCISE TYPE: LE THER EX

## 2023-11-09 ASSESSMENT — ENCOUNTER SYMPTOMS
PAIN LOCATION - PAIN SEVERITY: 5/10
HIGHEST PAIN SEVERITY IN PAST 24 HOURS: 8/10
MUSCLE WEAKNESS: 1
LOWEST PAIN SEVERITY IN PAST 24 HOURS: 0/10
OCCASIONAL FEELINGS OF UNSTEADINESS: 0
PAIN: 1
PAIN SEVERITY GOAL: 0/10
PAIN LOCATION: BACK
SUBJECTIVE PAIN PROGRESSION: UNCHANGED
PERSON REPORTING PAIN: PATIENT

## 2023-11-09 ASSESSMENT — ACTIVITIES OF DAILY LIVING (ADL)
PHYSICAL TRANSFERS ASSESSED: 1
AMBULATION ASSISTANCE: SUPERVISION
AMBULATION ASSISTANCE ON FLAT SURFACES: 1
CURRENT_FUNCTION: INDEPENDENT
AMBULATION ASSISTANCE: 1
AMBULATION_DISTANCE/DURATION_TOLERATED: 100 FT

## 2023-11-10 ENCOUNTER — DOCUMENTATION (OUTPATIENT)
Dept: SURGERY | Facility: HOSPITAL | Age: 68
End: 2023-11-10
Payer: COMMERCIAL

## 2023-11-10 RX ORDER — OXYCODONE HYDROCHLORIDE 5 MG/1
5 TABLET ORAL EVERY 6 HOURS PRN
Qty: 28 TABLET | Refills: 0 | Status: SHIPPED | OUTPATIENT
Start: 2023-11-10 | End: 2023-11-17

## 2023-11-10 NOTE — PROGRESS NOTES
Patient called for pain medicine refill. DOS 11/2/2023. I have personally reviewed the OARRS report for the patient, no issues identified. This report is scanned into the electronic medical record. I have considered the risks of abuse, dependence, addiction and diversion. The 7 day Rx sent to pharmacy on file. Colten Otoole PA-C

## 2023-11-13 ENCOUNTER — HOME CARE VISIT (OUTPATIENT)
Dept: HOME HEALTH SERVICES | Facility: HOME HEALTH | Age: 68
End: 2023-11-13
Payer: COMMERCIAL

## 2023-11-13 VITALS
DIASTOLIC BLOOD PRESSURE: 74 MMHG | TEMPERATURE: 98.1 F | HEART RATE: 68 BPM | SYSTOLIC BLOOD PRESSURE: 106 MMHG | RESPIRATION RATE: 18 BRPM

## 2023-11-13 PROCEDURE — G0151 HHCP-SERV OF PT,EA 15 MIN: HCPCS

## 2023-11-13 SDOH — HEALTH STABILITY: PHYSICAL HEALTH: EXERCISE TYPE: LE THER EX

## 2023-11-13 ASSESSMENT — ENCOUNTER SYMPTOMS
PAIN LOCATION - PAIN SEVERITY: 2/10
PAIN LOCATION: BACK
HIGHEST PAIN SEVERITY IN PAST 24 HOURS: 9/10
PAIN: 1
LOWEST PAIN SEVERITY IN PAST 24 HOURS: 2/10
SUBJECTIVE PAIN PROGRESSION: GRADUALLY IMPROVING
MUSCLE WEAKNESS: 1
PAIN SEVERITY GOAL: 0/10
PERSON REPORTING PAIN: PATIENT

## 2023-11-13 ASSESSMENT — ACTIVITIES OF DAILY LIVING (ADL)
CURRENT_FUNCTION: INDEPENDENT
AMBULATION ASSISTANCE ON FLAT SURFACES: 1
AMBULATION_DISTANCE/DURATION_TOLERATED: 100 FT
AMBULATION ASSISTANCE: INDEPENDENT
PHYSICAL TRANSFERS ASSESSED: 1
AMBULATION ASSISTANCE: 1

## 2023-11-14 DIAGNOSIS — Z98.1 STATUS POST LUMBAR SPINAL FUSION: Primary | ICD-10-CM

## 2023-11-14 RX ORDER — CYCLOBENZAPRINE HCL 10 MG
10 TABLET ORAL 3 TIMES DAILY PRN
Qty: 30 TABLET | Refills: 0 | Status: SHIPPED | OUTPATIENT
Start: 2023-11-14 | End: 2023-12-05 | Stop reason: SDUPTHER

## 2023-11-14 RX ORDER — PREDNISONE 20 MG/1
TABLET ORAL
Qty: 30 TABLET | Refills: 0 | Status: SHIPPED | OUTPATIENT
Start: 2023-11-14 | End: 2023-12-05 | Stop reason: ALTCHOICE

## 2023-11-17 PROCEDURE — G0180 MD CERTIFICATION HHA PATIENT: HCPCS | Performed by: ORTHOPAEDIC SURGERY

## 2023-11-18 ENCOUNTER — TRANSCRIBE ORDERS (OUTPATIENT)
Dept: ORTHOPEDIC SURGERY | Facility: HOSPITAL | Age: 68
End: 2023-11-18
Payer: COMMERCIAL

## 2023-11-18 DIAGNOSIS — Z98.1 STATUS POST LUMBAR SPINAL FUSION: ICD-10-CM

## 2023-11-20 ENCOUNTER — HOME CARE VISIT (OUTPATIENT)
Dept: HOME HEALTH SERVICES | Facility: HOME HEALTH | Age: 68
End: 2023-11-20
Payer: COMMERCIAL

## 2023-11-20 VITALS
TEMPERATURE: 97.8 F | HEART RATE: 62 BPM | DIASTOLIC BLOOD PRESSURE: 90 MMHG | RESPIRATION RATE: 18 BRPM | SYSTOLIC BLOOD PRESSURE: 150 MMHG

## 2023-11-20 PROCEDURE — G0151 HHCP-SERV OF PT,EA 15 MIN: HCPCS

## 2023-11-20 SDOH — HEALTH STABILITY: PHYSICAL HEALTH: EXERCISE TYPE: LE THER EX

## 2023-11-20 ASSESSMENT — GAIT ASSESSMENTS
PATH: 2 - STRAIGHT WITHOUT WALKING AID
WALKING STANCE: 1 - HEELS ALMOST TOUCHING WHILE WALKING
STEP CONTINUITY: 1 - STEPS APPEAR CONTINUOUS
GAIT SCORE: 12
PATH SCORE: 2
TRUNK SCORE: 2
TRUNK: 2 - NO SWAY, NO FLEXION, NO USE OF ARMS, NO WALKING AID
BALANCE AND GAIT SCORE: 28
STEP SYMMETRY: 1 - RIGHT AND LEFT STEP LENGTH APPEAR EQUAL
INITIATION OF GAIT IMMEDIATELY AFTER GO: 1 - NO HESITANCY

## 2023-11-20 ASSESSMENT — BALANCE ASSESSMENTS
TURNING 360 DEGREES STEPS: 1 - CONTINUOUS STEPS
IMMEDIATE STANDING BALANCE FIRST 5 SECONDS: 2 - STEADY WITHOUT WALKER OR OTHER SUPPORT
ARISING SCORE: 2
NUDGED SCORE: 2
BALANCE SCORE: 16
STANDING BALANCE: 2 - NARROW STANCE WITHOUT SUPPORT
EYES CLOSED AT MAXIMUM POSITION NUDGED: 1 - STEADY
NUDGED: 2 - STEADY
SITTING BALANCE: 1 - STEADY, SAFE
SITTING DOWN: 2 - SAFE, SMOOTH MOTION
ATTEMPTS TO ARISE: 2 - ABLE TO RISE, ONE ATTEMPT
ARISES: 2 - ABLE WITHOUT USING ARMS

## 2023-11-20 ASSESSMENT — ACTIVITIES OF DAILY LIVING (ADL)
AMBULATION_DISTANCE/DURATION_TOLERATED: 150 FT
HOME_HEALTH_OASIS: 01
PHYSICAL TRANSFERS ASSESSED: 1
AMBULATION ASSISTANCE: INDEPENDENT
CURRENT_FUNCTION: INDEPENDENT
AMBULATION ASSISTANCE: 1
AMBULATION ASSISTANCE ON FLAT SURFACES: 1
OASIS_M1830: 01

## 2023-11-20 ASSESSMENT — ENCOUNTER SYMPTOMS
MUSCLE WEAKNESS: 1
PAIN LOCATION: BACK
PAIN SEVERITY GOAL: 0/10
PERSON REPORTING PAIN: PATIENT
PAIN: 1
LOWEST PAIN SEVERITY IN PAST 24 HOURS: 1/10
PAIN LOCATION - PAIN SEVERITY: 3/10
OCCASIONAL FEELINGS OF UNSTEADINESS: 0
HIGHEST PAIN SEVERITY IN PAST 24 HOURS: 7/10
SUBJECTIVE PAIN PROGRESSION: GRADUALLY IMPROVING

## 2023-11-21 ENCOUNTER — ANCILLARY PROCEDURE (OUTPATIENT)
Dept: RADIOLOGY | Facility: CLINIC | Age: 68
End: 2023-11-21
Payer: COMMERCIAL

## 2023-11-21 ENCOUNTER — OFFICE VISIT (OUTPATIENT)
Dept: ORTHOPEDIC SURGERY | Facility: CLINIC | Age: 68
End: 2023-11-21
Payer: COMMERCIAL

## 2023-11-21 ENCOUNTER — APPOINTMENT (OUTPATIENT)
Dept: ORTHOPEDIC SURGERY | Facility: CLINIC | Age: 68
End: 2023-11-21
Payer: COMMERCIAL

## 2023-11-21 DIAGNOSIS — Z98.1 STATUS POST LUMBAR SPINAL FUSION: ICD-10-CM

## 2023-11-21 DIAGNOSIS — Z98.1 STATUS POST LUMBAR SPINAL FUSION: Primary | ICD-10-CM

## 2023-11-21 PROCEDURE — 1159F MED LIST DOCD IN RCRD: CPT

## 2023-11-21 PROCEDURE — 99024 POSTOP FOLLOW-UP VISIT: CPT

## 2023-11-21 PROCEDURE — 1111F DSCHRG MED/CURRENT MED MERGE: CPT

## 2023-11-21 PROCEDURE — 1036F TOBACCO NON-USER: CPT

## 2023-11-21 PROCEDURE — 72100 X-RAY EXAM L-S SPINE 2/3 VWS: CPT | Performed by: RADIOLOGY

## 2023-11-21 PROCEDURE — 1126F AMNT PAIN NOTED NONE PRSNT: CPT

## 2023-11-21 PROCEDURE — 72100 X-RAY EXAM L-S SPINE 2/3 VWS: CPT | Mod: FY

## 2023-11-21 PROCEDURE — 1160F RVW MEDS BY RX/DR IN RCRD: CPT

## 2023-11-21 RX ORDER — OXYCODONE HYDROCHLORIDE 5 MG/1
5 TABLET ORAL EVERY 6 HOURS PRN
Qty: 28 TABLET | Refills: 0 | Status: SHIPPED | OUTPATIENT
Start: 2023-11-21 | End: 2023-12-05 | Stop reason: ALTCHOICE

## 2023-11-21 NOTE — PROGRESS NOTES
HPI:  Patient is a 68-year-old male who presents today for initial postoperative visit for L5-S1 anterior lumbar fusion with posterior instrumentation on 11/2/2023 with Dr. Farrell.  Overall, the patient is doing well.  He states his previous numbness and tingling has resolved completely.  He states he is coming and going pain in the same distribution that is improving with time and becoming less frequent.  Patient states he is ambulating well and weaning off pain medications.  He states he is doing his exercises in the evening and that his leg strength feels good.  He requests 1 more medication pain refill.  No other questions or concerns at time of visit.  Full strength and range of motion of lower extremities bilaterally.  Negative straight leg raise bilaterally.    ROS:  Reviewed on EMR and patient intake sheet.    PMH/SH:  Reviewed on EMR and patient intake sheet.    Exam:  MSK: Anterior and posterior incisions visualized and appear well-healing without signs of infection, erythema, drainage, swelling.  Posterior sutures removed in office today.  General: No acute distress. Awake and conversant.  Eyes: Normal conjunctiva, anicteric. Round symmetric pupils.  ENT: Hearing grossly intact. No nasal discharge.  Neck: Neck is supple. No masses or thyromegaly.  Respiratory: Respirations are non-labored. No wheezing.  Skin: Warm. No rashes or ulcers.  Psych: Alert and oriented. Cooperative, appropriate mood and affect, normal judgement.  CV: No lower extremity edema.  Neuro: Sensation and CN II-XII grossly normal.    Radiology:     X-rays personally reviewed and demonstrate L5/S1 anterior fusion with interbody cage seated appropriately.  No fractures or dislocations.  Instrumentation intact and no signs of hardware malfunction.    Diagnosis:    Status post lumbar spinal fusion, anterior technique    Assessment and Plan:  Patient was seen today for initial visit following lumbar fusion. Overall, the patient is doing  wonderful. I recommended to continue walking as tolerated and to limit excessive bending, twisting, and lifting for the next 1-2 months. Patient should avoid NSAIDs for 2 additional months as this may decrease the efficacy of the fusion. We discussed the normal length of time for recovery from this type of surgery and that back/leg pain or numbness and tingling may come and go for a few weeks/months. Patient feels that all questions were appropriately answered at time of visit. Patient may follow up in 1 year for final images, or sooner if needed. Patient agrees to the plan above.     Patient presented for visit and requests pain medicine refill. DOS 11/02/2023. I have personally reviewed the OARRS report for the patient, no issues identified. This report is scanned into the electronic medical record. I have considered the risks of abuse, dependence, addiction and diversion. The 7 day Rx sent to pharmacy on file. Colten Otoole PA-C      This note was dictated using speech recognition software and was not corrected for spelling or grammatical errors    Colten Otoole PA-C  Department of Orthopaedic Surgery  8:31 AM  11/21/23      [address] [place] [address]    Voicemail: [phone]   Appts: [phone]  Fax: [phone]

## 2023-11-24 ENCOUNTER — TELEPHONE (OUTPATIENT)
Dept: ORTHOPEDIC SURGERY | Facility: HOSPITAL | Age: 68
End: 2023-11-24
Payer: COMMERCIAL

## 2023-11-24 NOTE — TELEPHONE ENCOUNTER
Patient called stating that he has fluid draining from incision and wants to know how to proceed.

## 2023-11-27 DIAGNOSIS — T81.89XA DRAINING POSTOPERATIVE WOUND, INITIAL ENCOUNTER: Primary | ICD-10-CM

## 2023-11-27 RX ORDER — SULFAMETHOXAZOLE AND TRIMETHOPRIM 800; 160 MG/1; MG/1
1 TABLET ORAL 2 TIMES DAILY
Qty: 28 TABLET | Refills: 0 | Status: SHIPPED | OUTPATIENT
Start: 2023-11-27 | End: 2023-12-19 | Stop reason: ALTCHOICE

## 2023-11-27 NOTE — PROGRESS NOTES
"Patient called stating he had white drainage from incision site that is white that started a few days ago. He states he is draining about \"one Q-tip\" per day. The incision is slightly red.  He denies fever, chills.  He has been keeping the wounds covered with a pad.    Plan:  I prescribed the patient Bactrim for 14 days.  I advised him to clean the wound twice a day with over-the-counter alcohol.  I instructed the patient he should come in if the drainage increases, or if he develops fever, chills, confusion, or other constitutional symptoms.    Colten Otoole PA-C  Department of Orthopaedic Surgery  9:03 AM  11/27/23    [address] [place] [address]    Voicemail: [phone]   Appts: [phone]  Fax: [phone]     "

## 2023-12-04 DIAGNOSIS — M47.816 LUMBAR SPONDYLOSIS: Primary | ICD-10-CM

## 2023-12-04 DIAGNOSIS — M54.16 LUMBAR RADICULOPATHY: ICD-10-CM

## 2023-12-04 RX ORDER — DULOXETIN HYDROCHLORIDE 30 MG/1
30 CAPSULE, DELAYED RELEASE ORAL DAILY
Qty: 30 CAPSULE | Refills: 3 | Status: SHIPPED | OUTPATIENT
Start: 2023-12-04 | End: 2024-05-01 | Stop reason: SDUPTHER

## 2023-12-05 ENCOUNTER — OFFICE VISIT (OUTPATIENT)
Dept: ORTHOPEDIC SURGERY | Facility: CLINIC | Age: 68
End: 2023-12-05
Payer: COMMERCIAL

## 2023-12-05 DIAGNOSIS — M54.16 LUMBAR RADICULOPATHY: ICD-10-CM

## 2023-12-05 DIAGNOSIS — T81.49XA SUPERFICIAL POSTOPERATIVE WOUND INFECTION: ICD-10-CM

## 2023-12-05 DIAGNOSIS — Z98.1 STATUS POST LUMBAR SPINAL FUSION: Primary | ICD-10-CM

## 2023-12-05 PROCEDURE — 1159F MED LIST DOCD IN RCRD: CPT | Performed by: PHYSICIAN ASSISTANT

## 2023-12-05 PROCEDURE — 1126F AMNT PAIN NOTED NONE PRSNT: CPT | Performed by: PHYSICIAN ASSISTANT

## 2023-12-05 PROCEDURE — 1160F RVW MEDS BY RX/DR IN RCRD: CPT | Performed by: PHYSICIAN ASSISTANT

## 2023-12-05 PROCEDURE — 99024 POSTOP FOLLOW-UP VISIT: CPT | Performed by: PHYSICIAN ASSISTANT

## 2023-12-05 PROCEDURE — 1036F TOBACCO NON-USER: CPT | Performed by: PHYSICIAN ASSISTANT

## 2023-12-05 RX ORDER — CYCLOBENZAPRINE HCL 10 MG
10 TABLET ORAL 3 TIMES DAILY PRN
Qty: 30 TABLET | Refills: 0 | Status: SHIPPED | OUTPATIENT
Start: 2023-12-05 | End: 2023-12-19 | Stop reason: SDUPTHER

## 2023-12-05 RX ORDER — GABAPENTIN 300 MG/1
600 CAPSULE ORAL 3 TIMES DAILY
Qty: 180 CAPSULE | Refills: 2 | Status: SHIPPED | OUTPATIENT
Start: 2023-12-05 | End: 2024-02-06 | Stop reason: ALTCHOICE

## 2023-12-05 RX ORDER — METHYLPREDNISOLONE 4 MG/1
TABLET ORAL
Qty: 21 TABLET | Refills: 0 | Status: SHIPPED | OUTPATIENT
Start: 2023-12-05 | End: 2023-12-19 | Stop reason: ALTCHOICE

## 2023-12-05 NOTE — PROGRESS NOTES
"HPI:  Angel Jordan is a 68 y.o. male who presents today for postop visit/wound check after undergoing lumbar fusion on 11/02/23 with Dr. Farrell.    Overall, patient is doing okay. Reports pain right anterior thigh and cramping sensation that increases throughout the day and worse with ambulation. Patient would like to get back to an active lifestyle and this has been frustrating for him. He tells me his pain has increased since stopping the steroids & muscle relaxants. He is currently taking Gabapentin 600mg BID & Cymbalta which he was taking prior to surgery.    Additionally, he comes in for a wound check of the midlines abdominal incision. Called the office approx 1 week ago reporting pus like drainage from the superior and inferior portions of the wound. He was prescribed Bactrim BID x 14 days (start 11/27 & finish 12/11) and instructed to cleanse daily with rubbing alcohol. Additionally he has been using a \"drying salve\" daily on the wound as well. Reports the drainage has subsided but he feels like there is an increased area of redness surrounding the wound.    ROS:  Reviewed on EMR and patient intake sheet.    PMH/SH:  Reviewed on EMR and patient intake sheet.    Exam:  Well appearing, NAD  Pleasant & cooperative  BMI 27.9  normal ROM lumbar spine with rotation, flexion/extension  no paraspinal muscle spasms  lower extremity sensation intact  lower extremity motor 5/5 throughout (no focal deficits)  Ambulating well without assist    Midline abdominal wound: well approximated without palpable fluid collection or active drainage. Nontender to palpation. No warmth to touch. Surrounding area of redness consistent with skin irritation not cellulitis    Radiology:     none    Assessment and Plan:  Status post lumbar fusion  Superficial postop wound infection    I again reviewed the Xrays with Angel and reassured him the hardware is all intact and in good position. I reassured him I anticipate the nerve " pain/discomfort to continue to improve with time.   In the meantime will refill muscle relaxer and increase gabapentin to 600mg TID.     Advised to finish out antibiotic therapy as prescribed. Cleanse wound daily with warm, soapy water & pat dry. Discontinue alcohol & drying salve. Should keep area covered with adhesive island dressing to prevent rubbing on pants.   Plan to followup in 2 weeks for wound check.     Patient in agreement to plan of care. Of course he can call at anytime with questions/concerns.    Loly Weiss PA-C  Department of Orthopaedic Surgery    [address] [place] [address]    Voicemail: [phone]   Appts: [phone]  Fax: [phone]

## 2023-12-12 ENCOUNTER — APPOINTMENT (OUTPATIENT)
Dept: ORTHOPEDIC SURGERY | Facility: CLINIC | Age: 68
End: 2023-12-12
Payer: COMMERCIAL

## 2023-12-19 ENCOUNTER — OFFICE VISIT (OUTPATIENT)
Dept: ORTHOPEDIC SURGERY | Facility: CLINIC | Age: 68
End: 2023-12-19
Payer: COMMERCIAL

## 2023-12-19 DIAGNOSIS — Z98.1 STATUS POST LUMBAR SPINAL FUSION: ICD-10-CM

## 2023-12-19 DIAGNOSIS — G89.18 POSTOPERATIVE PAIN AFTER SPINAL SURGERY: ICD-10-CM

## 2023-12-19 DIAGNOSIS — M54.16 LUMBAR RADICULOPATHY: Primary | ICD-10-CM

## 2023-12-19 PROCEDURE — 1160F RVW MEDS BY RX/DR IN RCRD: CPT | Performed by: PHYSICIAN ASSISTANT

## 2023-12-19 PROCEDURE — 1126F AMNT PAIN NOTED NONE PRSNT: CPT | Performed by: PHYSICIAN ASSISTANT

## 2023-12-19 PROCEDURE — 99024 POSTOP FOLLOW-UP VISIT: CPT | Performed by: PHYSICIAN ASSISTANT

## 2023-12-19 PROCEDURE — 1036F TOBACCO NON-USER: CPT | Performed by: PHYSICIAN ASSISTANT

## 2023-12-19 PROCEDURE — 1159F MED LIST DOCD IN RCRD: CPT | Performed by: PHYSICIAN ASSISTANT

## 2023-12-19 RX ORDER — CYCLOBENZAPRINE HCL 10 MG
10 TABLET ORAL 3 TIMES DAILY PRN
Qty: 30 TABLET | Refills: 0 | Status: SHIPPED | OUTPATIENT
Start: 2023-12-19 | End: 2023-12-29

## 2023-12-19 RX ORDER — OXYCODONE HYDROCHLORIDE 5 MG/1
5 TABLET ORAL EVERY 8 HOURS PRN
Qty: 21 TABLET | Refills: 0 | Status: SHIPPED | OUTPATIENT
Start: 2023-12-19 | End: 2023-12-26

## 2023-12-19 ASSESSMENT — PAIN - FUNCTIONAL ASSESSMENT: PAIN_FUNCTIONAL_ASSESSMENT: 0-10

## 2023-12-19 ASSESSMENT — PAIN SCALES - GENERAL: PAINLEVEL_OUTOF10: 4

## 2023-12-19 NOTE — PROGRESS NOTES
"HPI:  Angel Jordan is a 68 y.o. male who presents today for postop visit after undergoing lumbar fusion on 11/02/23 with Dr. Farrell. He comes in today for a wound check.    Overall, patient is doing better. The wound is much improved and he has finished the antibiotics. Reports minimal bloody drainage at the top and bottom of the wound; no pus or areas of dehiscence.     Continues to have discomfort into the RLE with ambulation. Reports he has been \"pushing\" himself and has been ambulating up to 1.5 miles daily. He is normally very active and is looking forward to getting back to his normal activities.     ROS:  Reviewed on EMR and patient intake sheet.    PMH/SH:  Reviewed on EMR and patient intake sheet.    Exam:  Well appearing, NAD  Pleasant & cooperative  normal ROM lumbar spine with rotation, flexion/extension  no paraspinal muscle spasms  lower extremity sensation intact  lower extremity motor 5/5 throughout  antalgic gait & station    Lumbar wound healing well. No active signs of infection. No drainage, palpable fluid collection, or dehiscence.     Radiology:     None     Assessment and Plan:  1. Lumbar radiculopathy  2. Status post lumbar spinal fusion  - Referral to Physical Therapy; Future  - refill pain medication    We reviewed wound care in detail today. Okay to leave incision open to air. Okay to shower letting warm, soapy water run down the wound, do not scrub, pat dry. Okay to apply small amount of hydrogen peroxide nightly to scabs if needed.     Okay to lift up to 25 pounds until 8 weeks postop, then may increase lifting as tolerated. Continue to limit excessive bending/lifting/twisting at the waist, however okay to do gentle ROM exercises to help reduce stiffness & increase mobility. No strenuous activity such as running/jumping/heavy lifting/activities that strain low back until 8 weeks postop. Encourage ambulating as tolerated. Okay to start outpatient PT at this time - referral placed " today.    Pain medication refilled today per patient request. OARRS reviewed. Discussed postop pain medication today and recommend transitioning from opioids to plain tylenol. No NSAIDs for lumbar until 8 weeks postop due to increased risk of pseudoarthrosis.     Plan to see patient for followup 3 months postop to check in, or sooner if needed. All questions answered. Patient in agreement to plan of care. Of course Angel Jordan can call at anytime with questions or concerns.     I have personally reviewed the OARRS report for the patient, no issues identified. This report is scanned into the electronic medical record. I have considered the risks of abuse, dependence, addiction and diversion. The 7 day Rx sent to pharmacy on file.    Loly Weiss PA-C  Department of Orthopaedic Surgery    [address] [place] [address]    Voicemail: [phone]   Appts: [phone]  Fax: [phone]

## 2023-12-21 PROBLEM — Z98.1 S/P LUMBAR FUSION: Status: ACTIVE | Noted: 2023-12-21

## 2023-12-21 NOTE — PROGRESS NOTES
"Physical Therapy Examination and Treatment Note    Patient Name: Angel Jordan  MRN Number: [id number]  Initial Examination Date: 12/22/23  Referring Provider: Loly Weiss PA-C   Evaluating Clinician: ABIGAIL Dye PT  ONSET DATE: 11/2/23 DOS    Today's Date: 12/22/2023       INSURANCE:  Visit Number: 1  Approved Visits: 40 (reports 12-15 prior PT visits in 2023)  Insurance Info: EGP - No auth. 40 visits (PT/OT comb.) 100% coverage after deductible. Deduct $1500   Insurance Auth Dates: NA  CMS Certification Period: NA    PRECAUTIONS AND ANY SPECIAL CONCERNS: Okay to lift up to 25 pounds until 8 weeks postop, then may increase lifting as tolerated. Continue to limit excessive bending/lifting/twisting at the waist, however okay to do gentle ROM exercises to help reduce stiffness & increase mobility. No strenuous activity such as running/jumping/heavy lifting/activities that strain low back until 8 weeks postop. Encourage ambulating as tolerated.      Surgical history: L/R TKR, Left TSR, CTR on right, hernia repair,     PT CLINICAL PROBLEM: Lumbar Radiculopathy  M54.16 Status Post L5-S1 Anterior Lumbar Fusion 11/2/23 DOS Z98.1    PROBLEM LIST: Chief Dx code:   1. Right lumbar radiculopathy        2. S/P lumbar fusion            SUBJECTIVE: States before surgery had LLE numbness dhara. When standing and this was the most concerning issue. Now is having increased LBP post operatively and the location he was feeling numbness in the LLE is now painful. He is 7 weeks and 1 day post op. 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 He is a golfer and also rides trike motorcycles. His goals are to \"get rid of the pain so I can go back to normal\" He has had very good results with past ortho surgeries but this one has been the most difficult. He gets very painful by 3pm in the afternoon. He gets some relief with sitting. His " "symptoms at worst are 7/10 and on average 3/10. His symptoms are worst in the right lateral thigh down the leg to the knee and shin. He has no symptoms at night to sleep. He does feel better to lay down.     TREATMENT:   Advised to lay down \"decompress\" 10-20 minutes periodically every few hours to ease symptoms some so it does get to the \"boiling point\" at 3pm. Periodic rests so things don't accumulate. FAQ 4 lbs 2 x 10, seated ham curl banded 2 x 10. Banded DF RLE 10x noted pt. Had muscular fatigue post rx today. He was expressly pleased.    HOME PROGRAM/EDUCATION:    OBJECTIVE:  OUTCOME MEASURE: KENNY 40%   Supple full hip rom   SLR 70 clear NE  Hip scour -  PKB quad -   Noted atrophy right quad and glute max  Quad 4-  Glute max 4-  TA 4-  Lumbar mobility 25% not tested any further but no peripheralization  Tender greater troch mild  Right glute med 3+  SLS right has mod left pelvic drop and difficult to hold with whole leg shaking noted  Gait decreased control and stability left knee        ASSESSMENT: Patient presents with intermediate tissue reactivity,  high condition irritability, and low subject reactivity with impairments noted below and decreased level of functional abilities and would benefit from skilled PT to address limitations and achieve goals noted below. RLE weakness at quads TA and glute max with nerve irritation and needs time for full recovery.          PLAN: Patient/parent/caregiver agreed and consented to plan of care for skilled physical therapy at 1 times per week for 8-10 weeks. Physical Therapy to include modalities prn as indicated, therapeutic exercise, manual therapy, neuromuscular re-education, gait and functional performance training, instruction and practice in a home exercise program (HEP) and self-management skills.          CARE PLAN/GOALS: (met, progressing, not progressing)  STG's: (weeks  4 / visits  4 )  Symptoms < 5 at 3 pm in afternoon  2. Right LE quad and TA strength " increase to 4+/5 so pt. Able to stand on right leg > 10 count with good stability and pelvifemoral control  LTG's: (weeks  10 /visits  10 )  3. Able to walk steps reciprocally with good strength and knee control  4. Minimal RLE/thight to knee symptoms in pm  5. The Global Rating of Change Score (GROC) will improve to 5/7 =  “a good deal better” or more.   6. Improve functional outcome tool score (FOTS: KENNY, NDI, ASES, ABC, etc.) by > 10 points for Minimally Important Clinical Difference (MICD).  7. Patient/client will be independent with a HEP and self-management skills to further enhance recovery and progress.  8. Patient/client will verbalize >75% symptom reduction for frequency and severity of symptoms and/or > two point reduction of VAS/NPRS.  9. The Patient Specific Functional Scale metric (PSFS) will improve from baseline value to greater than 80% functional.

## 2023-12-22 ENCOUNTER — EVALUATION (OUTPATIENT)
Dept: PHYSICAL THERAPY | Facility: CLINIC | Age: 68
End: 2023-12-22
Payer: COMMERCIAL

## 2023-12-22 DIAGNOSIS — Z98.1 STATUS POST LUMBAR SPINAL FUSION: ICD-10-CM

## 2023-12-22 DIAGNOSIS — Z98.1 S/P LUMBAR FUSION: ICD-10-CM

## 2023-12-22 DIAGNOSIS — M54.16 LUMBAR RADICULOPATHY: ICD-10-CM

## 2023-12-22 DIAGNOSIS — M54.16 RIGHT LUMBAR RADICULOPATHY: Primary | ICD-10-CM

## 2023-12-22 PROCEDURE — 97162 PT EVAL MOD COMPLEX 30 MIN: CPT | Mod: GP

## 2023-12-22 PROCEDURE — 97110 THERAPEUTIC EXERCISES: CPT | Mod: GP

## 2023-12-28 ENCOUNTER — TREATMENT (OUTPATIENT)
Dept: PHYSICAL THERAPY | Facility: CLINIC | Age: 68
End: 2023-12-28
Payer: COMMERCIAL

## 2023-12-28 DIAGNOSIS — Z98.1 S/P LUMBAR FUSION: ICD-10-CM

## 2023-12-28 DIAGNOSIS — M54.16 RIGHT LUMBAR RADICULOPATHY: Primary | ICD-10-CM

## 2023-12-28 PROCEDURE — 97110 THERAPEUTIC EXERCISES: CPT | Mod: GP

## 2023-12-28 NOTE — PROGRESS NOTES
"Physical Therapy Examination and Treatment Note    Patient Name: Angel Jordan  MRN Number: [id number]  Initial Examination Date: 12/22/23  Referring Provider: Loly Weiss PA-C   Evaluating Clinician: ABIGAIL Dye PT  ONSET DATE: 11/2/23 DOS    Today's Date: 12/28/2023  Time Calculation  Start Time: 0800  Stop Time: 0900  Time Calculation (min): 60 min    INSURANCE:  Visit Number: 2  Approved Visits: 40 (reports 12-15 prior PT visits in 2023)  Insurance Info: EGP - No auth. 40 visits (PT/OT comb.) 100% coverage after deductible. Deduct $1500   Insurance Auth Dates: NA  CMS Certification Period: NA    PRECAUTIONS AND ANY SPECIAL CONCERNS: Okay to lift up to 25 pounds until 8 weeks postop, then may increase lifting as tolerated. Continue to limit excessive bending/lifting/twisting at the waist, however okay to do gentle ROM exercises to help reduce stiffness & increase mobility. No strenuous activity such as running/jumping/heavy lifting/activities that strain low back until 8 weeks postop. Encourage ambulating as tolerated.      Surgical history: L/R TKR, Left TSR, CTR on right, hernia repair,     PT CLINICAL PROBLEM: Lumbar Radiculopathy  M54.16 Status Post L5-S1 Anterior Lumbar Fusion 11/2/23 DOS Z98.1    PROBLEM LIST: Chief Dx code:   1. Right lumbar radiculopathy  Follow Up In Physical Therapy      2. S/P lumbar fusion  Follow Up In Physical Therapy            SUBJECTIVE: States before surgery had RLE numbness dhara. When standing and this was the most concerning issue. Now is having increased LBP post operatively and the location he was feeling numbness in the RLE is now painful. He is 7 weeks and 1 day post op. 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 He is a golfer and also rides trike motorcycles. His goals are to \"get rid of the pain so I can go back to normal\" He has had very good results with past " "ortho surgeries but this one has been the most difficult. He gets very painful by 3pm in the afternoon. He gets some relief with sitting. His symptoms at worst are 7/10 and on average 3/10. His symptoms are worst in the right lateral thigh down the leg to the knee and shin. He has no symptoms at night to sleep. He does feel better to lay down.     12/28/23: Patient reports no new problems or concerns at this time.       TREATMENT:  Seated FAQ 4 lbs 3 x 10  Seated OTB ham curls 3 x 10   Seated OTB DF 3 x 10  Bike 3 min R3  Sit to stand partial arc squats 10 reps  Bilateral heel raises 10 reps  Supine decompression lying  SKTC 3 reps each  Figure 4 buttock stretch 3 x 20     Symptoms/NPRS before Rx: 3  Symptoms/NPRS after Rx:  5    Access Code: [email or web address]  URL: [email or web address]  Date: 12/28/2023  Prepared by: Olegario Dye    Exercises  - Hook Lying Buttock Stretch with One Leg Crossed Over the other  - 1-2 x daily - 3-5 reps - 15 hold  - Hooklying Single Knee to Chest Stretch  - 1-2 x daily - 3-5 reps - 15 hold  - Heel Raises with Counter Support  - 3-5 x weekly - 1-3 sets - 10 reps  - Calf Stretch Against Wall (Gastroc)  - 1-2 x daily - 3 reps - 30 hold  - Standing Calf Stretch on 2 by 4 Board  - 1-2 x daily - 3 reps - 30 hold  - Seated Hamstring Curl with Anchored Resistance  - 3-5 x weekly - 1-3 sets - 10 reps  - Seated Hip Abd/ER \"Band Outs\" Outer Hip Exercise  - 3-5 x weekly  - Seated Long Arc Quad with Ankle Weight  - 3-5 x weekly - 1-3 sets - 10 reps - 2 hold  - Sit to Stand Squat Movement  - 3 x weekly - 1-2 sets - 10 reps    OBJECTIVE:  OUTCOME MEASURE: KENNY 40%   Supple full hip rom   SLR 70 clear NE  Hip scour -  PKB quad -   Noted atrophy right quad and glute max  Quad 4-  Glute max 4-  TA 4-  Lumbar mobility 25% not tested any further but no peripheralization  Tender greater troch mild  Right glute med 3+  SLS right has mod left pelvic drop and difficult to hold with whole leg shaking " noted  Gait decreased control and stability left knee      ASSESSMENT:  Symptoms worsen with standing and sitting and ease with supine lying. Has dorsiflexion weakness RLE but states this has improved a lot since surgery he recalls almost having a foot drop.       PLAN: Patient/parent/caregiver agreed and consented to plan of care for skilled physical therapy at 1 times per week for 8-10 weeks. Physical Therapy to include modalities prn as indicated, therapeutic exercise, manual therapy, neuromuscular re-education, gait and functional performance training, instruction and practice in a home exercise program (HEP) and self-management skills.        CARE PLAN/GOALS: (met, progressing, not progressing)  STG's: (weeks  4 / visits  4 )  Symptoms < 5 at 3 pm in afternoon  2. Right LE quad and TA strength increase to 4+/5 so pt. Able to stand on right leg > 10 count with good stability and pelvifemoral control  LTG's: (weeks  10 /visits  10 )  3. Able to walk steps reciprocally with good strength and knee control  4. Minimal RLE/thight to knee symptoms in pm  5. The Global Rating of Change Score (GROC) will improve to 5/7 =  “a good deal better” or more.   6. Improve functional outcome tool score (FOTS: KENNY, NDI, ASES, ABC, etc.) by > 10 points for Minimally Important Clinical Difference (MICD).  7. Patient/client will be independent with a HEP and self-management skills to further enhance recovery and progress.  8. Patient/client will verbalize >75% symptom reduction for frequency and severity of symptoms and/or > two point reduction of VAS/NPRS.  9. The Patient Specific Functional Scale metric (PSFS) will improve from baseline value to greater than 80% functional.

## 2024-01-04 ENCOUNTER — TREATMENT (OUTPATIENT)
Dept: PHYSICAL THERAPY | Facility: CLINIC | Age: 69
End: 2024-01-04
Payer: COMMERCIAL

## 2024-01-04 DIAGNOSIS — M54.16 RIGHT LUMBAR RADICULOPATHY: Primary | ICD-10-CM

## 2024-01-04 DIAGNOSIS — Z98.1 S/P LUMBAR FUSION: ICD-10-CM

## 2024-01-04 PROCEDURE — 97110 THERAPEUTIC EXERCISES: CPT | Mod: GP

## 2024-01-04 NOTE — PROGRESS NOTES
"Physical Therapy Examination and Treatment Note    Patient Name: Angel Jordan  MRN Number: [id number]  Initial Examination Date: 12/22/23  Referring Provider: Loly Weiss PA-C   Evaluating Clinician: ABIGAIL Dye PT  ONSET DATE: 11/2/23 DOS    Today's Date: 1/4/2024  Time Calculation  Start Time: 0800  Stop Time: 0845  Time Calculation (min): 45 min    INSURANCE:  Visit Number: 3  Approved Visits: 40 (reports 12-15 prior PT visits in 2023)  Insurance Info: EGP - No auth. 40 visits (PT/OT comb.) 100% coverage after deductible. Deduct $1500   Insurance Auth Dates: NA  CMS Certification Period: NA    PRECAUTIONS AND ANY SPECIAL CONCERNS: Okay to lift up to 25 pounds until 8 weeks postop, then may increase lifting as tolerated. Continue to limit excessive bending/lifting/twisting at the waist, however okay to do gentle ROM exercises to help reduce stiffness & increase mobility. No strenuous activity such as running/jumping/heavy lifting/activities that strain low back until 8 weeks postop. Encourage ambulating as tolerated.      Surgical history: L/R TKR, Left TSR, CTR on right, hernia repair,     PT CLINICAL PROBLEM: Lumbar Radiculopathy  M54.16 Status Post L5-S1 Anterior Lumbar Fusion 11/2/23 DOS Z98.1    PROBLEM LIST: Chief Dx code:   1. Right lumbar radiculopathy  Follow Up In Physical Therapy      2. S/P lumbar fusion  Follow Up In Physical Therapy          SUBJECTIVE: States before surgery had RLE numbness dhara. When standing and this was the most concerning issue. Now is having increased LBP post operatively and the location he was feeling numbness in the RLE is now painful. He is 7 weeks and 1 day post op. 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 He is a golfer and also rides trike motorcycles. His goals are to \"get rid of the pain so I can go back to normal\" He has had very good results with past ortho " "surgeries but this one has been the most difficult. He gets very painful by 3pm in the afternoon. He gets some relief with sitting. His symptoms at worst are 7/10 and on average 3/10. His symptoms are worst in the right lateral thigh down the leg to the knee and shin. He has no symptoms at night to sleep. He does feel better to lay down.     12/28/23: Patient reports no new problems or concerns at this time.     1/4/24: Had the usual increase in symptoms in RLE day of last visit but also felt \"exhilarated\" in the exercises done. All in all symptoms are not as bad. Feels like the squats are \"building up the strength in my legs\"       TREATMENT:    Symptoms/NPRS before Rx: 2-3  Symptoms/NPRS after Rx: 5    Bike 5 min R3  Sit to stand squats 2 x 10 with mechanics edu  Standing CKC LE ER with glute squeeze  Supine LE banded ER and glute squeeze 10 reps  HL abdominal brace and ball squeeze 10 reps   HL band outs 2 x 10  Glute short arc bridge over yellow ball  HL banded march level 1  Supine ball rolls hip/knee rom and abdominals   Right hip abduction arom 10x  Bilateral heel raises at counter  Partial arc squat at counter 7x  SLS at counter 4x each          Access Code: [email or web address]  URL: [email or web address]  Date: 12/28/2023  Prepared by: Olegario Dye    Exercises  - Hook Lying Buttock Stretch with One Leg Crossed Over the other  - 1-2 x daily - 3-5 reps - 15 hold  - Hooklying Single Knee to Chest Stretch  - 1-2 x daily - 3-5 reps - 15 hold  - Heel Raises with Counter Support  - 3-5 x weekly - 1-3 sets - 10 reps  - Calf Stretch Against Wall (Gastroc)  - 1-2 x daily - 3 reps - 30 hold  - Standing Calf Stretch on 2 by 4 Board  - 1-2 x daily - 3 reps - 30 hold  - Seated Hamstring Curl with Anchored Resistance  - 3-5 x weekly - 1-3 sets - 10 reps  - Seated Hip Abd/ER \"Band Outs\" Outer Hip Exercise  - 3-5 x weekly  - Seated Long Arc Quad with Ankle Weight  - 3-5 x weekly - 1-3 sets - 10 reps - 2 hold  - Sit to Stand " Squat Movement  - 3 x weekly - 1-2 sets - 10 reps    OBJECTIVE:  OUTCOME MEASURE: KENNY 40%   Supple full hip rom   SLR 70 clear NE  Hip scour -  PKB quad -   Noted atrophy right quad and glute max  Quad 4-  Glute max 4-  TA 4-  Lumbar mobility 25% not tested any further but no peripheralization  Tender greater troch mild  Right glute med 3+  SLS right has mod left pelvic drop and difficult to hold with whole leg shaking noted  Gait decreased control and stability left knee      ASSESSMENT:  functional weakness RLE noted on single leg stance and mini squats        PLAN: Patient/parent/caregiver agreed and consented to plan of care for skilled physical therapy at 1 times per week for 8-10 weeks. Physical Therapy to include modalities prn as indicated, therapeutic exercise, manual therapy, neuromuscular re-education, gait and functional performance training, instruction and practice in a home exercise program (HEP) and self-management skills.        CARE PLAN/GOALS: (met, progressing, not progressing)  STG's: (weeks  4 / visits  4 )  Symptoms < 5 at 3 pm in afternoon  2. Right LE quad and TA strength increase to 4+/5 so pt. Able to stand on right leg > 10 count with good stability and pelvifemoral control  LTG's: (weeks  10 /visits  10 )  3. Able to walk steps reciprocally with good strength and knee control  4. Minimal RLE/thight to knee symptoms in pm  5. The Global Rating of Change Score (GROC) will improve to 5/7 =  “a good deal better” or more.   6. Improve functional outcome tool score (FOTS: KENNY, NDI, ASES, ABC, etc.) by > 10 points for Minimally Important Clinical Difference (MICD).  7. Patient/client will be independent with a HEP and self-management skills to further enhance recovery and progress.  8. Patient/client will verbalize >75% symptom reduction for frequency and severity of symptoms and/or > two point reduction of VAS/NPRS.  9. The Patient Specific Functional Scale metric (PSFS) will improve from  baseline value to greater than 80% functional.

## 2024-01-11 ENCOUNTER — TREATMENT (OUTPATIENT)
Dept: PHYSICAL THERAPY | Facility: CLINIC | Age: 69
End: 2024-01-11
Payer: COMMERCIAL

## 2024-01-11 DIAGNOSIS — Z98.1 S/P LUMBAR FUSION: ICD-10-CM

## 2024-01-11 DIAGNOSIS — M54.16 RIGHT LUMBAR RADICULOPATHY: ICD-10-CM

## 2024-01-11 PROCEDURE — 97110 THERAPEUTIC EXERCISES: CPT | Mod: GP

## 2024-01-11 NOTE — PROGRESS NOTES
"Physical Therapy Treatment Note    Patient Name: Angel Jordan  MRN Number: [id number]  Initial Examination Date: 12/22/23  Referring Provider: Lloy Weiss PA-C   Evaluating Clinician: ABIGAIL Dye PT  ONSET DATE: 11/2/23 DOS    Today's Date: 1/11/2024       INSURANCE:  Visit Number: 4  Approved Visits: 40 (reports 12-15 prior PT visits in 2023)  Insurance Info: EGP - No auth. 40 visits (PT/OT comb.) 100% coverage after deductible. Deduct $1500   Insurance Auth Dates: NA  CMS Certification Period: NA    PRECAUTIONS AND ANY SPECIAL CONCERNS: Okay to lift up to 25 pounds until 8 weeks postop, then may increase lifting as tolerated. Continue to limit excessive bending/lifting/twisting at the waist, however okay to do gentle ROM exercises to help reduce stiffness & increase mobility. No strenuous activity such as running/jumping/heavy lifting/activities that strain low back until 8 weeks postop. Encourage ambulating as tolerated.      Surgical history: L/R TKR, Left TSR, CTR on right, hernia repair,     PT CLINICAL PROBLEM: Lumbar Radiculopathy  M54.16 Status Post L5-S1 Anterior Lumbar Fusion 11/2/23 DOS Z98.1    PROBLEM LIST: Chief Dx code:   1. Right lumbar radiculopathy  Follow Up In Physical Therapy      2. S/P lumbar fusion  Follow Up In Physical Therapy          SUBJECTIVE: States before surgery had RLE numbness dhara. When standing and this was the most concerning issue. Now is having increased LBP post operatively and the location he was feeling numbness in the RLE is now painful. He is 7 weeks and 1 day post op. 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 He is a golfer and also rides trike motorcycles. His goals are to \"get rid of the pain so I can go back to normal\" He has had very good results with past ortho surgeries but this one has been the most difficult. He gets very painful by 3pm in the afternoon. " "He gets some relief with sitting. His symptoms at worst are 7/10 and on average 3/10. His symptoms are worst in the right lateral thigh down the leg to the knee and shin. He has no symptoms at night to sleep. He does feel better to lay down.     12/28/23: Patient reports no new problems or concerns at this time.     1/4/24: Had the usual increase in symptoms in RLE day of last visit but also felt \"exhilarated\" in the exercises done. All in all symptoms are not as bad. Feels like the squats are \"building up the strength in my legs\"     1/11/24: Had increased RLE pain for 1-2 days after last session. Worse to stand.       TREATMENT:    Symptoms/NPRS before Rx: 4-5 right low back and right thigh   Symptoms/NPRS after Rx: 5    Bike 5 min R3  SKTC 5 reps each  figure 4 buttock stretch Left and Right  HL band outs 2 x 10  Level 1 and 2 abdominal brace with leg lifts  Supine glute squeezes 10x  Abdominal brace with leg movements  Abdominal brace with ball squeezes 10x  PNE + education calming nervous system      Access Code: [email or web address]  URL: [email or web address]  Date: 12/28/2023  Prepared by: Olegario Dye    Exercises  - Hook Lying Buttock Stretch with One Leg Crossed Over the other  - 1-2 x daily - 3-5 reps - 15 hold  - Hooklying Single Knee to Chest Stretch  - 1-2 x daily - 3-5 reps - 15 hold  - Heel Raises with Counter Support  - 3-5 x weekly - 1-3 sets - 10 reps  - Calf Stretch Against Wall (Gastroc)  - 1-2 x daily - 3 reps - 30 hold  - Standing Calf Stretch on 2 by 4 Board  - 1-2 x daily - 3 reps - 30 hold  - Seated Hamstring Curl with Anchored Resistance  - 3-5 x weekly - 1-3 sets - 10 reps  - Seated Hip Abd/ER \"Band Outs\" Outer Hip Exercise  - 3-5 x weekly  - Seated Long Arc Quad with Ankle Weight  - 3-5 x weekly - 1-3 sets - 10 reps - 2 hold  - Sit to Stand Squat Movement  - 3 x weekly - 1-2 sets - 10 reps    OBJECTIVE:  OUTCOME MEASURE: KENNY 40%   Supple full hip rom   SLR 70 clear NE  Hip scour -  PKB quad " -   Noted atrophy right quad and glute max  Quad 4-  Glute max 4-  TA 4-  Lumbar mobility 25% not tested any further but no peripheralization  Tender greater troch mild  Right glute med 3+  SLS right has mod left pelvic drop and difficult to hold with whole leg shaking noted  Gait decreased control and stability left knee      ASSESSMENT: Pt. Very receptive to PNE alarm system EDU today       PLAN: NV manual to lumbar and continue with PNE approach  Patient/parent/caregiver agreed and consented to plan of care for skilled physical therapy at 1 times per week for 8-10 weeks. Physical Therapy to include modalities prn as indicated, therapeutic exercise, manual therapy, neuromuscular re-education, gait and functional performance training, instruction and practice in a home exercise program (HEP) and self-management skills.        CARE PLAN/GOALS: (met, progressing, not progressing)  STG's: (weeks  4 / visits  4 )  Symptoms < 5 at 3 pm in afternoon  2. Right LE quad and TA strength increase to 4+/5 so pt. Able to stand on right leg > 10 count with good stability and pelvifemoral control  LTG's: (weeks  10 /visits  10 )  3. Able to walk steps reciprocally with good strength and knee control  4. Minimal RLE/thight to knee symptoms in pm  5. The Global Rating of Change Score (GROC) will improve to 5/7 =  “a good deal better” or more.   6. Improve functional outcome tool score (FOTS: KENNY, NDI, ASES, ABC, etc.) by > 10 points for Minimally Important Clinical Difference (MICD).  7. Patient/client will be independent with a HEP and self-management skills to further enhance recovery and progress.  8. Patient/client will verbalize >75% symptom reduction for frequency and severity of symptoms and/or > two point reduction of VAS/NPRS.  9. The Patient Specific Functional Scale metric (PSFS) will improve from baseline value to greater than 80% functional.

## 2024-01-18 ENCOUNTER — TREATMENT (OUTPATIENT)
Dept: PHYSICAL THERAPY | Facility: CLINIC | Age: 69
End: 2024-01-18
Payer: COMMERCIAL

## 2024-01-18 DIAGNOSIS — Z98.1 S/P LUMBAR FUSION: ICD-10-CM

## 2024-01-18 DIAGNOSIS — M54.16 RIGHT LUMBAR RADICULOPATHY: Primary | ICD-10-CM

## 2024-01-18 PROCEDURE — 97140 MANUAL THERAPY 1/> REGIONS: CPT | Mod: GP

## 2024-01-18 PROCEDURE — 97014 ELECTRIC STIMULATION THERAPY: CPT | Mod: GP

## 2024-01-18 PROCEDURE — 97110 THERAPEUTIC EXERCISES: CPT | Mod: GP

## 2024-01-18 NOTE — PROGRESS NOTES
"Physical Therapy Treatment Note    Patient Name: Angel Jordan  MRN Number: [id number]  Initial Examination Date: 12/22/23  Referring Provider: Loly Weiss PA-C   Evaluating Clinician: ABIGAIL Dye PT  ONSET DATE: 11/2/23 DOS    Today's Date: 1/18/2024  Time Calculation  Start Time: 0800  Stop Time: 0845  Time Calculation (min): 45 min    INSURANCE:  Visit Number: 5  Approved Visits: 40   Insurance Info: EGP - No auth. 40 visits (PT/OT comb.) 100% coverage after deductible. Deduct $1500   Insurance Auth Dates: NA  CMS Certification Period: NA    PRECAUTIONS AND ANY SPECIAL CONCERNS: Okay to lift up to 25 pounds until 8 weeks postop, then may increase lifting as tolerated. Continue to limit excessive bending/lifting/twisting at the waist, however okay to do gentle ROM exercises to help reduce stiffness & increase mobility. No strenuous activity such as running/jumping/heavy lifting/activities that strain low back until 8 weeks postop. Encourage ambulating as tolerated.      Surgical history: L/R TKR, Left TSR, CTR on right, hernia repair,     PT CLINICAL PROBLEM: Lumbar Radiculopathy  M54.16 Status Post L5-S1 Anterior Lumbar Fusion 11/2/23 DOS Z98.1    PROBLEM LIST: Chief Dx code:   1. Right lumbar radiculopathy        2. S/P lumbar fusion              SUBJECTIVE: States before surgery had RLE numbness dhara. When standing and this was the most concerning issue. Now is having increased LBP post operatively and the location he was feeling numbness in the RLE is now painful. He is 7 weeks and 1 day post op. 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 He is a golfer and also rides trike motorcycles. His goals are to \"get rid of the pain so I can go back to normal\" He has had very good results with past ortho surgeries but this one has been the most difficult. He gets very painful by 3pm in the afternoon. He gets some " "relief with sitting. His symptoms at worst are 7/10 and on average 3/10. His symptoms are worst in the right lateral thigh down the leg to the knee and shin. He has no symptoms at night to sleep. He does feel better to lay down.     12/28/23: Patient reports no new problems or concerns at this time.     1/4/24: Had the usual increase in symptoms in RLE day of last visit but also felt \"exhilarated\" in the exercises done. All in all symptoms are not as bad. Feels like the squats are \"building up the strength in my legs\"     1/11/24: Had increased RLE pain for 1-2 days after last session. Worse to stand.       TREATMENT:    Symptoms/NPRS before Rx: 2-4  Symptoms/NPRS after Rx: 2  PNE +alarm system, breathing, relaxation, lowering states of tension, positive vs negative thoughts, graded activity  Prone IFS right gluteal 12 min  Moist heat lumbar 12 min  Massage and STM   SKTC 5x each graded and sub symptom threshold  Buttock stretch with deep breathing 3 x 30 each    Access Code: [email or web address]  URL: [email or web address]  Date: 12/28/2023  Prepared by: Olegario Dye    Exercises  - Hook Lying Buttock Stretch with One Leg Crossed Over the other  - 1-2 x daily - 3-5 reps - 15 hold  - Hooklying Single Knee to Chest Stretch  - 1-2 x daily - 3-5 reps - 15 hold  - Heel Raises with Counter Support  - 3-5 x weekly - 1-3 sets - 10 reps  - Calf Stretch Against Wall (Gastroc)  - 1-2 x daily - 3 reps - 30 hold  - Standing Calf Stretch on 2 by 4 Board  - 1-2 x daily - 3 reps - 30 hold  - Seated Hamstring Curl with Anchored Resistance  - 3-5 x weekly - 1-3 sets - 10 reps  - Seated Hip Abd/ER \"Band Outs\" Outer Hip Exercise  - 3-5 x weekly  - Seated Long Arc Quad with Ankle Weight  - 3-5 x weekly - 1-3 sets - 10 reps - 2 hold  - Sit to Stand Squat Movement  - 3 x weekly - 1-2 sets - 10 reps    OBJECTIVE:  OUTCOME MEASURE: KENNY 40%   Supple full hip rom   SLR 70 clear NE  Hip scour -  PKB quad -   Noted atrophy right quad and glute " max  Quad 4-  Glute max 4-  TA 4-  Lumbar mobility 25% not tested any further but no peripheralization  Tender greater troch mild  Right glute med 3+  SLS right has mod left pelvic drop and difficult to hold with whole leg shaking noted  Gait decreased control and stability left knee  Provided hand out of alarm system excitation levels.       ASSESSMENT: Pt. Very receptive to PNE alarm system EDU today and reduced symptoms post rx and pt. Expressly pleased.        PLAN: NV manual to lumbar and continue with PNE approach  Patient/parent/caregiver agreed and consented to plan of care for skilled physical therapy at 1 times per week for 8-10 weeks. Physical Therapy to include modalities prn as indicated, therapeutic exercise, manual therapy, neuromuscular re-education, gait and functional performance training, instruction and practice in a home exercise program (HEP) and self-management skills.        CARE PLAN/GOALS: (met, progressing, not progressing)  STG's: (weeks  4 / visits  4 )  Symptoms < 5 at 3 pm in afternoon  2. Right LE quad and TA strength increase to 4+/5 so pt. Able to stand on right leg > 10 count with good stability and pelvifemoral control  LTG's: (weeks  10 /visits  10 )  3. Able to walk steps reciprocally with good strength and knee control  4. Minimal RLE/thight to knee symptoms in pm  5. The Global Rating of Change Score (GROC) will improve to 5/7 =  “a good deal better” or more.   6. Improve functional outcome tool score (FOTS: KENNY, NDI, ASES, ABC, etc.) by > 10 points for Minimally Important Clinical Difference (MICD).  7. Patient/client will be independent with a HEP and self-management skills to further enhance recovery and progress.  8. Patient/client will verbalize >75% symptom reduction for frequency and severity of symptoms and/or > two point reduction of VAS/NPRS.  9. The Patient Specific Functional Scale metric (PSFS) will improve from baseline value to greater than 80% functional.

## 2024-01-19 DIAGNOSIS — E78.00 HYPERCHOLESTEROLEMIA: ICD-10-CM

## 2024-01-19 RX ORDER — ATORVASTATIN CALCIUM 20 MG/1
20 TABLET, FILM COATED ORAL NIGHTLY
Qty: 90 TABLET | Refills: 0 | Status: SHIPPED | OUTPATIENT
Start: 2024-01-19 | End: 2024-04-15

## 2024-01-25 ENCOUNTER — TREATMENT (OUTPATIENT)
Dept: PHYSICAL THERAPY | Facility: CLINIC | Age: 69
End: 2024-01-25
Payer: COMMERCIAL

## 2024-01-25 DIAGNOSIS — Z98.1 S/P LUMBAR FUSION: ICD-10-CM

## 2024-01-25 DIAGNOSIS — M54.16 RIGHT LUMBAR RADICULOPATHY: ICD-10-CM

## 2024-01-25 PROCEDURE — 97014 ELECTRIC STIMULATION THERAPY: CPT | Mod: GP

## 2024-01-25 PROCEDURE — 97140 MANUAL THERAPY 1/> REGIONS: CPT | Mod: GP

## 2024-01-25 PROCEDURE — 97110 THERAPEUTIC EXERCISES: CPT | Mod: GP

## 2024-01-25 NOTE — PROGRESS NOTES
"Physical Therapy Treatment Note    Patient Name: Angel Jordan  MRN Number: [id number]  Initial Examination Date: 12/22/23  Referring Provider: Loly Weiss PA-C   Evaluating Clinician: ABIGAIL Dye PT  ONSET DATE: 11/2/23 DOS    Today's Date: 1/25/2024  Time Calculation  Start Time: 0900  Stop Time: 1000  Time Calculation (min): 60 min    INSURANCE:  Visit Number: 6  Approved Visits: 40   Insurance Info: EGP - No auth. 40 visits (PT/OT comb.) 100% coverage after deductible. Deduct $1500   Insurance Auth Dates: NA  CMS Certification Period: NA    PRECAUTIONS AND ANY SPECIAL CONCERNS: Okay to lift up to 25 pounds until 8 weeks postop, then may increase lifting as tolerated. Continue to limit excessive bending/lifting/twisting at the waist, however okay to do gentle ROM exercises to help reduce stiffness & increase mobility. No strenuous activity such as running/jumping/heavy lifting/activities that strain low back until 8 weeks postop. Encourage ambulating as tolerated.      Surgical history: L/R TKR, Left TSR, CTR on right, hernia repair,     PT CLINICAL PROBLEM: Lumbar Radiculopathy  M54.16 Status Post L5-S1 Anterior Lumbar Fusion 11/2/23 DOS Z98.1    PROBLEM LIST: Chief Dx code:   1. Right lumbar radiculopathy  Follow Up In Physical Therapy      2. S/P lumbar fusion  Follow Up In Physical Therapy            SUBJECTIVE: States before surgery had RLE numbness dhara. When standing and this was the most concerning issue. Now is having increased LBP post operatively and the location he was feeling numbness in the RLE is now painful. He is 7 weeks and 1 day post op. 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 He is a golfer and also rides trike motorcycles. His goals are to \"get rid of the pain so I can go back to normal\" He has had very good results with past ortho surgeries but this one has been the most difficult. " "He gets very painful by 3pm in the afternoon. He gets some relief with sitting. His symptoms at worst are 7/10 and on average 3/10. His symptoms are worst in the right lateral thigh down the leg to the knee and shin. He has no symptoms at night to sleep. He does feel better to lay down.     1/25/24: States he felt better for 3 days after last visit. Had less intense RLE symptoms when standing and walking and when he sat down the symptoms went away altogether. Was very pleased.       TREATMENT:    Symptoms/NPRS before Rx: 2-4  Symptoms/NPRS after Rx: 2  PNE +alarm system, breathing, relaxation, lowering states of tension, positive vs negative thoughts, graded activity  Prone IFS right gluteal 12 min  Moist heat lumbar 12 min  Massage and STM   SKTC 5x each graded and sub symptom threshold  Buttock stretch with deep breathing 3 x 30 each  Supine glute squeezes 10x  HL band outs   Graded Motor imagery [facility]    Access Code: [email or web address]  URL: [email or web address]  Date: 12/28/2023  Prepared by: Olegario Dye    Exercises  - Hook Lying Buttock Stretch with One Leg Crossed Over the other  - 1-2 x daily - 3-5 reps - 15 hold  - Hooklying Single Knee to Chest Stretch  - 1-2 x daily - 3-5 reps - 15 hold  - Heel Raises with Counter Support  - 3-5 x weekly - 1-3 sets - 10 reps  - Calf Stretch Against Wall (Gastroc)  - 1-2 x daily - 3 reps - 30 hold  - Standing Calf Stretch on 2 by 4 Board  - 1-2 x daily - 3 reps - 30 hold  - Seated Hamstring Curl with Anchored Resistance  - 3-5 x weekly - 1-3 sets - 10 reps  - Seated Hip Abd/ER \"Band Outs\" Outer Hip Exercise  - 3-5 x weekly  - Seated Long Arc Quad with Ankle Weight  - 3-5 x weekly - 1-3 sets - 10 reps - 2 hold  - Sit to Stand Squat Movement  - 3 x weekly - 1-2 sets - 10 reps    OBJECTIVE:  OUTCOME MEASURE: KENNY 40%   Supple full hip rom   SLR 70 clear NE  Hip scour -  PKB quad -   Noted atrophy right quad and glute max  Quad 4-  Glute max 4-  TA 4-  Lumbar mobility 25% " not tested any further but no peripheralization  Tender greater troch mild  Right glute med 3+  SLS right has mod left pelvic drop and difficult to hold with whole leg shaking noted  Gait decreased control and stability left knee  Provided hand out of alarm system excitation levels.       ASSESSMENT: Responded very well to last session and manual/modalities/and PNE+       PLAN: NV manual to lumbar and continue with PNE approach  Patient/parent/caregiver agreed and consented to plan of care for skilled physical therapy at 1 times per week for 8-10 weeks. Physical Therapy to include modalities prn as indicated, therapeutic exercise, manual therapy, neuromuscular re-education, gait and functional performance training, instruction and practice in a home exercise program (HEP) and self-management skills.        CARE PLAN/GOALS: (met, progressing, not progressing)  STG's: (weeks  4 / visits  4 )  Symptoms < 5 at 3 pm in afternoon  2. Right LE quad and TA strength increase to 4+/5 so pt. Able to stand on right leg > 10 count with good stability and pelvifemoral control  LTG's: (weeks  10 /visits  10 )  3. Able to walk steps reciprocally with good strength and knee control  4. Minimal RLE/thight to knee symptoms in pm  5. The Global Rating of Change Score (GROC) will improve to 5/7 =  “a good deal better” or more.   6. Improve functional outcome tool score (FOTS: KENNY, NDI, ASES, ABC, etc.) by > 10 points for Minimally Important Clinical Difference (MICD).  7. Patient/client will be independent with a HEP and self-management skills to further enhance recovery and progress.  8. Patient/client will verbalize >75% symptom reduction for frequency and severity of symptoms and/or > two point reduction of VAS/NPRS.  9. The Patient Specific Functional Scale metric (PSFS) will improve from baseline value to greater than 80% functional.

## 2024-02-01 ENCOUNTER — HOSPITAL ENCOUNTER (EMERGENCY)
Facility: HOSPITAL | Age: 69
Discharge: HOME | End: 2024-02-01
Attending: EMERGENCY MEDICINE
Payer: COMMERCIAL

## 2024-02-01 ENCOUNTER — TREATMENT (OUTPATIENT)
Dept: PHYSICAL THERAPY | Facility: CLINIC | Age: 69
End: 2024-02-01
Payer: COMMERCIAL

## 2024-02-01 VITALS
HEIGHT: 67 IN | DIASTOLIC BLOOD PRESSURE: 97 MMHG | OXYGEN SATURATION: 98 % | RESPIRATION RATE: 18 BRPM | HEART RATE: 67 BPM | BODY MASS INDEX: 29.58 KG/M2 | WEIGHT: 188.49 LBS | TEMPERATURE: 97.9 F | SYSTOLIC BLOOD PRESSURE: 151 MMHG

## 2024-02-01 DIAGNOSIS — M54.16 RIGHT LUMBAR RADICULOPATHY: ICD-10-CM

## 2024-02-01 DIAGNOSIS — S46.812A TRAPEZIUS MUSCLE STRAIN, LEFT, INITIAL ENCOUNTER: ICD-10-CM

## 2024-02-01 DIAGNOSIS — K08.89 PAIN, DENTAL: Primary | ICD-10-CM

## 2024-02-01 DIAGNOSIS — Z98.1 S/P LUMBAR FUSION: ICD-10-CM

## 2024-02-01 DIAGNOSIS — I10 HYPERTENSION, UNSPECIFIED TYPE: ICD-10-CM

## 2024-02-01 PROCEDURE — 2500000004 HC RX 250 GENERAL PHARMACY W/ HCPCS (ALT 636 FOR OP/ED): Performed by: EMERGENCY MEDICINE

## 2024-02-01 PROCEDURE — 99284 EMERGENCY DEPT VISIT MOD MDM: CPT | Performed by: EMERGENCY MEDICINE

## 2024-02-01 PROCEDURE — 97110 THERAPEUTIC EXERCISES: CPT | Mod: GP

## 2024-02-01 PROCEDURE — 2500000005 HC RX 250 GENERAL PHARMACY W/O HCPCS: Performed by: EMERGENCY MEDICINE

## 2024-02-01 PROCEDURE — 99283 EMERGENCY DEPT VISIT LOW MDM: CPT | Mod: 25

## 2024-02-01 PROCEDURE — 96372 THER/PROPH/DIAG INJ SC/IM: CPT

## 2024-02-01 PROCEDURE — 2500000001 HC RX 250 WO HCPCS SELF ADMINISTERED DRUGS (ALT 637 FOR MEDICARE OP): Performed by: EMERGENCY MEDICINE

## 2024-02-01 PROCEDURE — 97140 MANUAL THERAPY 1/> REGIONS: CPT | Mod: GP

## 2024-02-01 RX ORDER — ORPHENADRINE CITRATE 30 MG/ML
60 INJECTION INTRAMUSCULAR; INTRAVENOUS ONCE
Status: COMPLETED | OUTPATIENT
Start: 2024-02-01 | End: 2024-02-01

## 2024-02-01 RX ORDER — LIDOCAINE 560 MG/1
1 PATCH PERCUTANEOUS; TOPICAL; TRANSDERMAL DAILY
Status: DISCONTINUED | OUTPATIENT
Start: 2024-02-01 | End: 2024-02-01 | Stop reason: HOSPADM

## 2024-02-01 RX ORDER — IBUPROFEN 600 MG/1
600 TABLET ORAL ONCE
Status: COMPLETED | OUTPATIENT
Start: 2024-02-01 | End: 2024-02-01

## 2024-02-01 RX ORDER — LIDOCAINE 50 MG/G
1 PATCH TOPICAL DAILY
Qty: 5 PATCH | Refills: 0 | Status: SHIPPED | OUTPATIENT
Start: 2024-02-01 | End: 2024-02-06 | Stop reason: WASHOUT

## 2024-02-01 RX ORDER — IBUPROFEN 600 MG/1
600 TABLET ORAL EVERY 6 HOURS PRN
Qty: 20 TABLET | Refills: 0 | Status: SHIPPED | OUTPATIENT
Start: 2024-02-01 | End: 2024-02-06

## 2024-02-01 RX ORDER — ACETAMINOPHEN 325 MG/1
650 TABLET ORAL ONCE
Status: COMPLETED | OUTPATIENT
Start: 2024-02-01 | End: 2024-02-01

## 2024-02-01 RX ORDER — METHOCARBAMOL 500 MG/1
500 TABLET, FILM COATED ORAL 3 TIMES DAILY
Qty: 15 TABLET | Refills: 0 | Status: SHIPPED | OUTPATIENT
Start: 2024-02-01 | End: 2024-02-06 | Stop reason: WASHOUT

## 2024-02-01 RX ADMIN — ORPHENADRINE CITRATE 60 MG: 60 INJECTION INTRAMUSCULAR; INTRAVENOUS at 15:35

## 2024-02-01 RX ADMIN — LIDOCAINE 1 PATCH: 4 PATCH TOPICAL at 15:35

## 2024-02-01 RX ADMIN — IBUPROFEN 600 MG: 600 TABLET, FILM COATED ORAL at 15:35

## 2024-02-01 RX ADMIN — ACETAMINOPHEN 650 MG: 325 TABLET ORAL at 15:35

## 2024-02-01 ASSESSMENT — COLUMBIA-SUICIDE SEVERITY RATING SCALE - C-SSRS
2. HAVE YOU ACTUALLY HAD ANY THOUGHTS OF KILLING YOURSELF?: NO
6. HAVE YOU EVER DONE ANYTHING, STARTED TO DO ANYTHING, OR PREPARED TO DO ANYTHING TO END YOUR LIFE?: NO
1. IN THE PAST MONTH, HAVE YOU WISHED YOU WERE DEAD OR WISHED YOU COULD GO TO SLEEP AND NOT WAKE UP?: NO

## 2024-02-01 ASSESSMENT — PAIN DESCRIPTION - PAIN TYPE: TYPE: ACUTE PAIN

## 2024-02-01 ASSESSMENT — PAIN DESCRIPTION - FREQUENCY: FREQUENCY: CONSTANT/CONTINUOUS

## 2024-02-01 ASSESSMENT — PAIN SCALES - GENERAL
PAINLEVEL_OUTOF10: 4
PAINLEVEL_OUTOF10: 9

## 2024-02-01 ASSESSMENT — PAIN DESCRIPTION - ORIENTATION: ORIENTATION: LEFT

## 2024-02-01 ASSESSMENT — PAIN DESCRIPTION - ONSET: ONSET: GRADUAL

## 2024-02-01 ASSESSMENT — PAIN DESCRIPTION - LOCATION: LOCATION: NECK

## 2024-02-01 ASSESSMENT — PAIN DESCRIPTION - DESCRIPTORS: DESCRIPTORS: ACHING;SHARP

## 2024-02-01 ASSESSMENT — PAIN DESCRIPTION - PROGRESSION: CLINICAL_PROGRESSION: GRADUALLY WORSENING

## 2024-02-01 ASSESSMENT — PAIN - FUNCTIONAL ASSESSMENT: PAIN_FUNCTIONAL_ASSESSMENT: 0-10

## 2024-02-01 NOTE — PROGRESS NOTES
"Physical Therapy Treatment Note    Patient Name: Angel Jordan  MRN Number: [id number]  Initial Examination Date: 12/22/23  Referring Provider: Loly Weiss PA-C   Evaluating Clinician: ABIGAIL Dye PT  ONSET DATE: 11/2/23 DOS    Today's Date: 2/1/2024  Time Calculation  Start Time: 0800  Stop Time: 0845  Time Calculation (min): 45 min    INSURANCE:  Visit Number: 8  Approved Visits: 40   Insurance Info: EGP - No auth. 40 visits (PT/OT comb.) 100% coverage after deductible. Deduct $1500   Insurance Auth Dates: NA  CMS Certification Period: NA    PRECAUTIONS AND ANY SPECIAL CONCERNS: Okay to lift up to 25 pounds until 8 weeks postop, then may increase lifting as tolerated. Continue to limit excessive bending/lifting/twisting at the waist, however okay to do gentle ROM exercises to help reduce stiffness & increase mobility. No strenuous activity such as running/jumping/heavy lifting/activities that strain low back until 8 weeks postop. Encourage ambulating as tolerated.      Surgical history: L/R TKR, Left TSR, CTR on right, hernia repair,     PT CLINICAL PROBLEM: Lumbar Radiculopathy  M54.16 Status Post L5-S1 Anterior Lumbar Fusion 11/2/23 DOS Z98.1    PROBLEM LIST: Chief Dx code:   1. Right lumbar radiculopathy  Follow Up In Physical Therapy      2. S/P lumbar fusion  Follow Up In Physical Therapy            SUBJECTIVE: States before surgery had RLE numbness dhara. When standing and this was the most concerning issue. Now is having increased LBP post operatively and the location he was feeling numbness in the RLE is now painful. He is 7 weeks and 1 day post op. 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 He is a golfer and also rides trike motorcycles. His goals are to \"get rid of the pain so I can go back to normal\" He has had very good results with past ortho surgeries but this one has been the most difficult. " "He gets very painful by 3pm in the afternoon. He gets some relief with sitting. His symptoms at worst are 7/10 and on average 3/10. His symptoms are worst in the right lateral thigh down the leg to the knee and shin. He has no symptoms at night to sleep. He does feel better to lay down.     1/25/24: States he felt better for 3 days after last visit. Had less intense RLE symptoms when standing and walking and when he sat down the symptoms went away altogether. Was very pleased.     2/1/24: Continues to be pleased with progress and feeling better.       TREATMENT:  Symptoms/NPRS before Rx:  3  Symptoms/NPRS after Rx: 2  PNE +alarm system, breathing, relaxation, lowering states of tension, positive vs negative thoughts, graded activity  Prone IFS right gluteal 12 min  Moist heat lumbar 12 min  Massage and STM   SKTC 5x each graded and sub symptom threshold  Buttock stretch with deep breathing 3 x 30 each  Supine glute squeezes 10x  HL band outs   Graded Motor imagery [facility]    Access Code: [email or web address]  URL: [email or web address]  Date: 12/28/2023  Prepared by: Olegario Dye    Exercises  - Hook Lying Buttock Stretch with One Leg Crossed Over the other  - 1-2 x daily - 3-5 reps - 15 hold  - Hooklying Single Knee to Chest Stretch  - 1-2 x daily - 3-5 reps - 15 hold  - Heel Raises with Counter Support  - 3-5 x weekly - 1-3 sets - 10 reps  - Calf Stretch Against Wall (Gastroc)  - 1-2 x daily - 3 reps - 30 hold  - Standing Calf Stretch on 2 by 4 Board  - 1-2 x daily - 3 reps - 30 hold  - Seated Hamstring Curl with Anchored Resistance  - 3-5 x weekly - 1-3 sets - 10 reps  - Seated Hip Abd/ER \"Band Outs\" Outer Hip Exercise  - 3-5 x weekly  - Seated Long Arc Quad with Ankle Weight  - 3-5 x weekly - 1-3 sets - 10 reps - 2 hold  - Sit to Stand Squat Movement  - 3 x weekly - 1-2 sets - 10 reps    OBJECTIVE:  OUTCOME MEASURE: KENNY 40%   Supple full hip rom   SLR 70 clear NE  Hip scour -  PKB quad -   Noted atrophy right " quad and glute max  Quad 4-  Glute max 4-  TA 4-  Lumbar mobility 25% not tested any further but no peripheralization  Tender greater troch mild  Right glute med 3+  SLS right has mod left pelvic drop and difficult to hold with whole leg shaking noted  Gait decreased control and stability left knee  Provided hand out of alarm system excitation levels.       ASSESSMENT: Responded very well to last session and manual/modalities/and PNE+       PLAN: NV manual to lumbar and continue with PNE approach  Patient/parent/caregiver agreed and consented to plan of care for skilled physical therapy at 1 times per week for 8-10 weeks. Physical Therapy to include modalities prn as indicated, therapeutic exercise, manual therapy, neuromuscular re-education, gait and functional performance training, instruction and practice in a home exercise program (HEP) and self-management skills.        CARE PLAN/GOALS: (met, progressing, not progressing)  STG's: (weeks  4 / visits  4 )  Symptoms < 5 at 3 pm in afternoon  2. Right LE quad and TA strength increase to 4+/5 so pt. Able to stand on right leg > 10 count with good stability and pelvifemoral control  LTG's: (weeks  10 /visits  10 )  3. Able to walk steps reciprocally with good strength and knee control  4. Minimal RLE/thight to knee symptoms in pm  5. The Global Rating of Change Score (GROC) will improve to 5/7 =  “a good deal better” or more.   6. Improve functional outcome tool score (FOTS: KENNY, NDI, ASES, ABC, etc.) by > 10 points for Minimally Important Clinical Difference (MICD).  7. Patient/client will be independent with a HEP and self-management skills to further enhance recovery and progress.  8. Patient/client will verbalize >75% symptom reduction for frequency and severity of symptoms and/or > two point reduction of VAS/NPRS.  9. The Patient Specific Functional Scale metric (PSFS) will improve from baseline value to greater than 80% functional.

## 2024-02-01 NOTE — DISCHARGE INSTRUCTIONS
Follow-up with your primary care physician within 1 to 2 days for further management of your current symptoms, repeat check of your blood pressure, and to discuss a possible referral to physical therapy.      Follow-up with your dentist within 1 to 2 days for further management of your dental pain      Return to the emergency department sooner with worsening of symptoms or onset of new symptoms

## 2024-02-01 NOTE — Clinical Note
Angel Jordan was seen and treated in our emergency department on 2/1/2024.  He may return to work on 02/02/2024.  No heavy lifting or bending/straining     If you have any questions or concerns, please don't hesitate to call.      Princess Wilson MD

## 2024-02-01 NOTE — ED PROVIDER NOTES
HPI   No chief complaint on file.      HPI                    No data recorded                Patient History   Past Medical History:   Diagnosis Date    CKD (chronic kidney disease)     8/30/23: creatinine 1.43, GFR 54    Difficult intravenous access     requiring use of ultrasound prior to surgery to obtain IV access    GERD (gastroesophageal reflux disease)     Gout     Hyperlipidemia     Hypertension     Hyponatremia     8/30/23: Na 133    Nasal polyp     Skin cancer     s/p excision     Past Surgical History:   Procedure Laterality Date    CARPAL TUNNEL RELEASE Right 2016    COLONOSCOPY  2011    HERNIA REPAIR Right 2014    inguinal    KNEE SURGERY Left 2010    Knee Surgery-MENISCUS    LASIK Bilateral     2001    ROTATOR CUFF REPAIR Left 2016    Rotator Cuff Repair    TONSILLECTOMY  1959    TOTAL KNEE ARTHROPLASTY Left     2017    TOTAL KNEE ARTHROPLASTY Right 2022    TOTAL SHOULDER ARTHROPLASTY Left 03/17/2023     Family History   Problem Relation Name Age of Onset    Coronary artery disease Mother      Cancer Mother      Stroke Mother      Hypertension Mother      Coronary artery disease Father      Cancer Brother      Other (coagulation defects) Other family history     Hearing loss Other family history      Social History     Tobacco Use    Smoking status: Never    Smokeless tobacco: Never   Substance Use Topics    Alcohol use: Not Currently    Drug use: Never       Physical Exam   ED Triage Vitals   Temp Pulse Resp BP   -- -- -- --      SpO2 Temp src Heart Rate Source Patient Position   -- -- -- --      BP Location FiO2 (%)     -- --       Physical Exam    ED Course & MDM   Diagnoses as of 02/01/24 1524   Pain, dental   Trapezius muscle strain, left, initial encounter   Hypertension, unspecified type       Medical Decision Making    The patient is a 68-year-old male presenting to the emergency department for evaluation of left upper back/neck pain.  He states he woke up with the pain this morning.  He  states it is painful when he is trying to turn his head from side-to-side.  It is painful if he is moving around.  He states he still has pain even if he is sitting still.  He denies any recent injury or trauma.  No visual changes.  No headache.  No focal weakness or numbness.  No difficulty swallowing or speaking but he also reports that he does have some pain in his front teeth.  He states he has had that pain before.  He denies any chest pain or shortness of breath.  No abdominal pain.  No nausea vomiting.  No diarrhea or constipation but no urinary complaints.  All pertinent positives and negatives are recorded above.  All other systems reviewed and otherwise negative.  Vital signs with hypertension but otherwise within normal limits.  Physical exam with a well-nourished well-developed male who appears to be uncomfortable but in no other acute distress.  He has no visible or palpable dental abscess.  No pooling of secretions.  No stridor.  He has no focal midline neck or back pain with palpation.  No step-offs.  He does have pain with palpation of the left trapezius muscle.  He has no gross motor, neurologic or vascular deficits on exam.  Strength is 5 out of 5 in all 4 extremities.  Sensation is intact.  He is able to walk and stand without difficulty.  He has no evidence of airway compromise or respiratory distress.  Abdominal exam is benign.      IM Norflex, Lidoderm patch, oral ibuprofen and oral acetaminophen ordered.      No indication for emergent imaging or diagnostic testing at this time.        The patient was released in good condition.  He was instructed to follow-up with his primary care physician within 1 to 2 days for further management of his current symptoms, repeat check of his blood pressure, and to discuss a referral to physical therapy with his primary care physician.  He was also given a referral to the on-call dentist for evaluation of his dental pain.  He will return to the emergency  department sooner with worsening of symptoms or onset of new symptoms.  Rx given for Lidoderm patches, Robaxin and ibuprofen.       Impression/diagnosis  Left trapezius muscle strain  Hypertension, unspecified  3.  Dental pain, front incisors      Procedure  Procedures     Princess Wilson MD  02/01/24 [id number]

## 2024-02-05 ENCOUNTER — HOSPITAL ENCOUNTER (EMERGENCY)
Facility: HOSPITAL | Age: 69
Discharge: HOME | End: 2024-02-06
Attending: EMERGENCY MEDICINE
Payer: COMMERCIAL

## 2024-02-05 DIAGNOSIS — N17.9 ACUTE KIDNEY INJURY (CMS-HCC): ICD-10-CM

## 2024-02-05 DIAGNOSIS — M62.838 MUSCLE SPASM: ICD-10-CM

## 2024-02-05 DIAGNOSIS — I15.9 SECONDARY HYPERTENSION: Primary | ICD-10-CM

## 2024-02-05 DIAGNOSIS — T39.395A ADVERSE EFFECT OF NON-STEROIDAL ANTI-INFLAMMATORY DRUG (NSAID), INITIAL ENCOUNTER: ICD-10-CM

## 2024-02-05 PROCEDURE — 99284 EMERGENCY DEPT VISIT MOD MDM: CPT | Mod: 25 | Performed by: EMERGENCY MEDICINE

## 2024-02-05 ASSESSMENT — PAIN DESCRIPTION - LOCATION: LOCATION: HEAD

## 2024-02-05 ASSESSMENT — PAIN DESCRIPTION - PAIN TYPE: TYPE: ACUTE PAIN

## 2024-02-05 ASSESSMENT — PAIN SCALES - GENERAL: PAINLEVEL_OUTOF10: 5 - MODERATE PAIN

## 2024-02-05 ASSESSMENT — PAIN - FUNCTIONAL ASSESSMENT: PAIN_FUNCTIONAL_ASSESSMENT: 0-10

## 2024-02-05 NOTE — Clinical Note
Angel Jordan was seen and treated in our emergency department on 2/5/2024.  He may return to work on 02/08/2024.       If you have any questions or concerns, please don't hesitate to call.      Adriana Colorado MD

## 2024-02-06 ENCOUNTER — APPOINTMENT (OUTPATIENT)
Dept: CARDIOLOGY | Facility: HOSPITAL | Age: 69
End: 2024-02-06
Payer: COMMERCIAL

## 2024-02-06 ENCOUNTER — APPOINTMENT (OUTPATIENT)
Dept: RADIOLOGY | Facility: HOSPITAL | Age: 69
End: 2024-02-06
Payer: COMMERCIAL

## 2024-02-06 VITALS
DIASTOLIC BLOOD PRESSURE: 80 MMHG | SYSTOLIC BLOOD PRESSURE: 164 MMHG | BODY MASS INDEX: 29.97 KG/M2 | OXYGEN SATURATION: 97 % | HEIGHT: 67 IN | RESPIRATION RATE: 16 BRPM | HEART RATE: 68 BPM | TEMPERATURE: 97.5 F | WEIGHT: 190.92 LBS

## 2024-02-06 LAB
ALBUMIN SERPL-MCNC: 4.1 G/DL (ref 3.5–5)
ALP BLD-CCNC: 85 U/L (ref 35–125)
ALT SERPL-CCNC: 23 U/L (ref 5–40)
ANION GAP SERPL CALC-SCNC: 9 MMOL/L
APPEARANCE UR: CLEAR
AST SERPL-CCNC: 25 U/L (ref 5–40)
ATRIAL RATE: 61 BPM
BASOPHILS # BLD AUTO: 0.05 X10*3/UL (ref 0–0.1)
BASOPHILS NFR BLD AUTO: 0.6 %
BILIRUB SERPL-MCNC: <0.2 MG/DL (ref 0.1–1.2)
BILIRUB UR STRIP.AUTO-MCNC: NEGATIVE MG/DL
BUN SERPL-MCNC: 24 MG/DL (ref 8–25)
CALCIUM SERPL-MCNC: 9.1 MG/DL (ref 8.5–10.4)
CHLORIDE SERPL-SCNC: 103 MMOL/L (ref 97–107)
CO2 SERPL-SCNC: 26 MMOL/L (ref 24–31)
COLOR UR: COLORLESS
CREAT SERPL-MCNC: 1.6 MG/DL (ref 0.4–1.6)
EGFRCR SERPLBLD CKD-EPI 2021: 47 ML/MIN/1.73M*2
EOSINOPHIL # BLD AUTO: 0.41 X10*3/UL (ref 0–0.7)
EOSINOPHIL NFR BLD AUTO: 4.9 %
ERYTHROCYTE [DISTWIDTH] IN BLOOD BY AUTOMATED COUNT: 13.7 % (ref 11.5–14.5)
GLUCOSE SERPL-MCNC: 94 MG/DL (ref 65–99)
GLUCOSE UR STRIP.AUTO-MCNC: NORMAL MG/DL
HCT VFR BLD AUTO: 36.3 % (ref 41–52)
HGB BLD-MCNC: 12.3 G/DL (ref 13.5–17.5)
IMM GRANULOCYTES # BLD AUTO: 0.03 X10*3/UL (ref 0–0.7)
IMM GRANULOCYTES NFR BLD AUTO: 0.4 % (ref 0–0.9)
KETONES UR STRIP.AUTO-MCNC: NEGATIVE MG/DL
LEUKOCYTE ESTERASE UR QL STRIP.AUTO: NEGATIVE
LYMPHOCYTES # BLD AUTO: 2.24 X10*3/UL (ref 1.2–4.8)
LYMPHOCYTES NFR BLD AUTO: 26.5 %
MCH RBC QN AUTO: 30 PG (ref 26–34)
MCHC RBC AUTO-ENTMCNC: 33.9 G/DL (ref 32–36)
MCV RBC AUTO: 89 FL (ref 80–100)
MONOCYTES # BLD AUTO: 0.91 X10*3/UL (ref 0.1–1)
MONOCYTES NFR BLD AUTO: 10.8 %
NEUTROPHILS # BLD AUTO: 4.81 X10*3/UL (ref 1.2–7.7)
NEUTROPHILS NFR BLD AUTO: 56.8 %
NITRITE UR QL STRIP.AUTO: NEGATIVE
NRBC BLD-RTO: 0 /100 WBCS (ref 0–0)
P AXIS: 47 DEGREES
P OFFSET: 200 MS
P ONSET: 145 MS
PH UR STRIP.AUTO: 5 [PH]
PLATELET # BLD AUTO: 250 X10*3/UL (ref 150–450)
POTASSIUM SERPL-SCNC: 4.3 MMOL/L (ref 3.4–5.1)
PR INTERVAL: 148 MS
PROT SERPL-MCNC: 6.5 G/DL (ref 5.9–7.9)
PROT UR STRIP.AUTO-MCNC: NEGATIVE MG/DL
Q ONSET: 219 MS
QRS COUNT: 10 BEATS
QRS DURATION: 120 MS
QT INTERVAL: 420 MS
QTC CALCULATION(BAZETT): 422 MS
QTC FREDERICIA: 422 MS
R AXIS: 8 DEGREES
RBC # BLD AUTO: 4.1 X10*6/UL (ref 4.5–5.9)
RBC # UR STRIP.AUTO: NEGATIVE /UL
SODIUM SERPL-SCNC: 138 MMOL/L (ref 133–145)
SP GR UR STRIP.AUTO: 1.01
T AXIS: 5 DEGREES
T OFFSET: 429 MS
TROPONIN T SERPL-MCNC: 13 NG/L
TROPONIN T SERPL-MCNC: 14 NG/L
UROBILINOGEN UR STRIP.AUTO-MCNC: NORMAL MG/DL
VENTRICULAR RATE: 61 BPM
WBC # BLD AUTO: 8.5 X10*3/UL (ref 4.4–11.3)

## 2024-02-06 PROCEDURE — 2500000005 HC RX 250 GENERAL PHARMACY W/O HCPCS: Performed by: EMERGENCY MEDICINE

## 2024-02-06 PROCEDURE — 2500000004 HC RX 250 GENERAL PHARMACY W/ HCPCS (ALT 636 FOR OP/ED): Performed by: EMERGENCY MEDICINE

## 2024-02-06 PROCEDURE — 36415 COLL VENOUS BLD VENIPUNCTURE: CPT | Performed by: PHYSICIAN ASSISTANT

## 2024-02-06 PROCEDURE — 2500000001 HC RX 250 WO HCPCS SELF ADMINISTERED DRUGS (ALT 637 FOR MEDICARE OP): Performed by: EMERGENCY MEDICINE

## 2024-02-06 PROCEDURE — 93005 ELECTROCARDIOGRAM TRACING: CPT

## 2024-02-06 PROCEDURE — 96361 HYDRATE IV INFUSION ADD-ON: CPT

## 2024-02-06 PROCEDURE — 84484 ASSAY OF TROPONIN QUANT: CPT | Performed by: PHYSICIAN ASSISTANT

## 2024-02-06 PROCEDURE — 81003 URINALYSIS AUTO W/O SCOPE: CPT | Performed by: PHYSICIAN ASSISTANT

## 2024-02-06 PROCEDURE — 71046 X-RAY EXAM CHEST 2 VIEWS: CPT | Mod: FOREIGN READ | Performed by: RADIOLOGY

## 2024-02-06 PROCEDURE — 71046 X-RAY EXAM CHEST 2 VIEWS: CPT | Mod: FR

## 2024-02-06 PROCEDURE — 80053 COMPREHEN METABOLIC PANEL: CPT | Performed by: PHYSICIAN ASSISTANT

## 2024-02-06 PROCEDURE — 96360 HYDRATION IV INFUSION INIT: CPT

## 2024-02-06 PROCEDURE — 85025 COMPLETE CBC W/AUTO DIFF WBC: CPT | Performed by: PHYSICIAN ASSISTANT

## 2024-02-06 RX ORDER — GABAPENTIN 300 MG/1
300 CAPSULE ORAL 3 TIMES DAILY
Qty: 15 CAPSULE | Refills: 0 | Status: SHIPPED | OUTPATIENT
Start: 2024-02-06 | End: 2024-03-12 | Stop reason: SDUPTHER

## 2024-02-06 RX ORDER — ACETAMINOPHEN 325 MG/1
650 TABLET ORAL EVERY 6 HOURS PRN
Qty: 30 TABLET | Refills: 0 | Status: SHIPPED | OUTPATIENT
Start: 2024-02-06 | End: 2024-02-16

## 2024-02-06 RX ORDER — GABAPENTIN 300 MG/1
300 CAPSULE ORAL 2 TIMES DAILY
Status: DISCONTINUED | OUTPATIENT
Start: 2024-02-06 | End: 2024-02-06 | Stop reason: HOSPADM

## 2024-02-06 RX ORDER — ACETAMINOPHEN 325 MG/1
650 TABLET ORAL ONCE
Status: COMPLETED | OUTPATIENT
Start: 2024-02-06 | End: 2024-02-06

## 2024-02-06 RX ORDER — LIDOCAINE 50 MG/G
1 PATCH TOPICAL DAILY
Qty: 14 PATCH | Refills: 0 | Status: SHIPPED | OUTPATIENT
Start: 2024-02-06 | End: 2024-02-20

## 2024-02-06 RX ORDER — LIDOCAINE 560 MG/1
1 PATCH PERCUTANEOUS; TOPICAL; TRANSDERMAL ONCE
Status: DISCONTINUED | OUTPATIENT
Start: 2024-02-06 | End: 2024-02-06 | Stop reason: HOSPADM

## 2024-02-06 RX ADMIN — GABAPENTIN 300 MG: 300 CAPSULE ORAL at 06:53

## 2024-02-06 RX ADMIN — ACETAMINOPHEN 650 MG: 325 TABLET ORAL at 06:53

## 2024-02-06 RX ADMIN — SODIUM CHLORIDE 500 ML: 9 INJECTION, SOLUTION INTRAVENOUS at 01:55

## 2024-02-06 RX ADMIN — LIDOCAINE 1 PATCH: 4 PATCH TOPICAL at 06:53

## 2024-02-06 NOTE — ED TRIAGE NOTES
I initially saw this patient in a triage capacity to medically screen and initiate their care. All room assignments are assigned through nursing and nursing managment. I have personally spoken to the charge nurse regarding my concerns of this patient and their need for bed assignment. My role was to initiate care from a triage capacity in order to assistant expediting diagnostics/treatment. For completion of care refer to other ED staff documentation.    History of Present Illness:  Patient is a 68-year-old male with a history of hypertension presenting for evaluation of his hypertension.  He states over the past 3 days his blood pressure has not had a systolic of 160 and a diastolic that has been above 100.  He states this occurred shortly after he was diagnosed with a pulled trapezius and began taking methocarbamol and using lidocaine patches for pain relief.  He is unsure if the 2 are related.  Given his high blood pressure, patient took additional metoprolol this evening, 175 mg in total versus his normal 100 mg/day.  He also takes lisinopril for his hypertension.  No associated chest pain or shortness of breath.  No lightheadedness or dizziness.  No diaphoresis or palpitations.    Physical Examination:  Vitals reviewed per nursing/medical technician triage record.  GENERAL APPEARANCE: This patient is in no acute respiratory distress. Awake and alert.talking appropriately. Answering questions appropriately. No evidence of pressured speech.  HEENT: Normocephalic, atraumatic.   NECK: full gross ROM during exam without difficulty.  CHEST: Normal rate and rhythm with audible S1-S2 without murmur or gallop.  RESPIRATORY: Lungs clear throughout bilaterally without acute respiratory distress or labored breathing.  MUSCULOSKELETAL: Full gross active range of motion. Ambulating on own with no acute difficulties.  NEUROLOGICAL: Awake, alert and oriented x 3.  IMMUNOLOGICAL: No palpable lymphadenopathy or lymphatic  streaking noted on visible skin.  DERM: No petechiae, rashes, or ecchymoses. on visible skin.  PSYCH: Mood and affect appear normal.    Impression/Further Considerations:  Final impression pending further workup and diagnostic testing results. See additional ED documentation for details.

## 2024-02-06 NOTE — ED PROVIDER NOTES
HPI   Chief Complaint   Patient presents with    Hypertension     HTN x3 days, took 150mg metropolol, tonight, reg 50mg/day        68-year-old male with a history of hypertension presenting for evaluation of his hypertension.  He states over the past 3 days his blood pressure has not had a systolic below 160 and a diastolic that has been above 100.  He states this occurred shortly after he was diagnosed with a pulled trapezius and began taking methocarbamol and using lidocaine patches for pain relief.  He also notes that he has been using 600 mg of ibuprofen several times daily.  He has a history of kidney problems, but has been well-controlled and sees Dr. Sanches from nephrology.  He is unsure if the 2 are related.  Given his high blood pressure, patient took additional metoprolol this evening, 175 mg in total versus his normal 100 mg/day.  He also takes lisinopril for his hypertension.  No associated chest pain or shortness of breath.  No lightheadedness or dizziness.  No diaphoresis or palpitations.  He also denies any use of steroids or urinary tract infection symptoms.             History provided by:  Patient and spouse   used: No                        No data recorded                Patient History   Past Medical History:   Diagnosis Date    CKD (chronic kidney disease)     8/30/23: creatinine 1.43, GFR 54    Difficult intravenous access     requiring use of ultrasound prior to surgery to obtain IV access    GERD (gastroesophageal reflux disease)     Gout     Hyperlipidemia     Hypertension     Hyponatremia     8/30/23: Na 133    Nasal polyp     Skin cancer     s/p excision     Past Surgical History:   Procedure Laterality Date    CARPAL TUNNEL RELEASE Right 2016    COLONOSCOPY  2011    HERNIA REPAIR Right 2014    inguinal    KNEE SURGERY Left 2010    Knee Surgery-MENISCUS    LASIK Bilateral     2001    ROTATOR CUFF REPAIR Left 2016    Rotator Cuff Repair    TONSILLECTOMY  1959    TOTAL  KNEE ARTHROPLASTY Left     2017    TOTAL KNEE ARTHROPLASTY Right 2022    TOTAL SHOULDER ARTHROPLASTY Left 03/17/2023     Family History   Problem Relation Name Age of Onset    Coronary artery disease Mother      Cancer Mother      Stroke Mother      Hypertension Mother      Coronary artery disease Father      Cancer Brother      Other (coagulation defects) Other family history     Hearing loss Other family history      Social History     Tobacco Use    Smoking status: Never    Smokeless tobacco: Never   Substance Use Topics    Alcohol use: Not Currently    Drug use: Never       Physical Exam   ED Triage Vitals [02/05/24 2343]   Temperature Heart Rate Respirations BP   36.4 °C (97.5 °F) 58 16 (!) 189/97      Pulse Ox Temp Source Heart Rate Source Patient Position   99 % Oral -- Sitting      BP Location FiO2 (%)     Right arm --       Physical Exam  Vitals and nursing note reviewed.   Constitutional:       General: He is not in acute distress.     Appearance: He is well-developed and normal weight. He is not ill-appearing, toxic-appearing or diaphoretic.   HENT:      Head: Normocephalic and atraumatic.   Eyes:      Conjunctiva/sclera: Conjunctivae normal.   Cardiovascular:      Rate and Rhythm: Normal rate and regular rhythm.      Heart sounds: No murmur heard.  Pulmonary:      Effort: Pulmonary effort is normal. No respiratory distress.      Breath sounds: Normal breath sounds.   Abdominal:      Palpations: Abdomen is soft.      Tenderness: There is no abdominal tenderness.   Musculoskeletal:         General: No swelling.      Cervical back: Neck supple.      Comments: Continued tenderness to palpation along left side of neck and at base of skull consistent with trapezius muscle sprain   Skin:     General: Skin is warm and dry.      Capillary Refill: Capillary refill takes less than 2 seconds.   Neurological:      Mental Status: He is alert.   Psychiatric:         Mood and Affect: Mood normal.         ED Course & MDM    ED Course as of 02/06/24 0834   Tue Feb 06, 2024   0522 XR chest 2 views  IMPRESSION:  Minimal platelike atelectasis at the left lung base.  No distinct  consolidation or overt pulmonary edema.   [EG]   0534 Workup ordered by the physician in triage is incomplete at 5:40 AM.  Urinalysis that was ordered at 2358 is still not collected.  Repeat troponin is also not repeated.  The patient was unaware that a urinalysis was ordered and states that he went to the restroom 3 times. [EG]   0639 Urinalysis with Reflex Culture and Microscopic(!)  Noncontributory [EG]   0639 Troponin T Series, High Sensitivity (0, 2HR, 6HR)  Negative x 2 [EG]   0640 Within normal limits except for improved anemia [EG]   0640 Comprehensive metabolic panel(!)  Within normal limits except for acute kidney injury with an elevation in creatinine of 1.6 from 1.173 months ago [EG]   0641 ECG 12 lead  See interpretation [EG]      ED Course User Index  [EG] Adriana Colorado MD         Diagnoses as of 02/06/24 0834   Secondary hypertension   Muscle spasm   Acute kidney injury (CMS/HCC)   Adverse effect of non-steroidal anti-inflammatory drug (NSAID), initial encounter       Medical Decision Making    HPI:  As Above  PMHx/PSHx/Meds/Allergies/SH/FH as per nursing documentation and reviewed.  Review of systems: Total of 10 systems reviewed and otherwise negative except as noted elsewhere    DDX: As described in MDM    If performed, radiology listed above interpreted by me and confirmed by the Radiologist.  Medications administered during this visit (name and route): see MAR  Social determinants of health considered for this visit: lives at home  If performed, EKG interpreted by me and detailed above    MDM Summary/considerations:  68-year-old male presenting with elevated blood pressure.  ECG and troponins are not consistent with ACS and he denies any chest pain, palpitations, shortness of breath.  The patient's lab work was reviewed and he has  an acute kidney injury.  The patient was surprised because he has been improving for several months and follows closely with his nephrologist.  He has been taking ibuprofen with greater frequency for the pain in his neck.  At the time when it was prescribed his creatinine was less than 1.2.  As he is now having an elevation to 1.6 he is instructed to discontinue taking NSAIDs for pain.  He will be given a prescription for increase gabapentin for the next 5 days to start addressing pain as well as lidocaine patches.  Otherwise he also has no focal neurological deficit and is not complaining of dizziness.  Patient is instructed to treat pain and to continue monitoring his blood pressure and to follow-up closely with his primary care provider as well as his nephrologist.    Prescriptions provided include: Refill for lidocaine patches and gabapentin    The patient was seen and triaged by our nursing/medic staff, their vitals were taken and the staff notes were reviewed.  If the patient arrived by an EMS squad or an outside agency, we discussed the case with transporting EMS medic, police, or other historians. My initial assessment was attention to their airway, breathing, and circulatory status.  We addressed any immediate or life threatening findings and completed a medical history and a physical exam if the patient or those legally responsible were in agreement with this.   Prior to the patient being discharged, I or my PA/NP or the nursing staff discussed the differential, results and discharge plan with the patient and/or family/friend/caregiver if present.  I emphasized the importance of follow-up in 2-3 days unless otherwise specified.  I explained reasons for the patient to return to the Emergency Department. Additional verbal discharge instructions were also given and discussed with the patient to supplement those generated by the EMR. We also discussed medications that were prescribed (if any) including common  side effects and interactions. The patient was advised to abstain from driving, operating heavy machinery or making significant decisions while taking medications such as antihistamines, benzodiazepines, opiates and muscle relaxers. All questions were addressed.  They understand return precautions and discharge instructions. The patient and/or family/friend/caregiver expressed understanding.  **Disclaimer:  This note was dictated by speech recognition technology.  Minor errors in transcription may be present.  Please contact for clarification or corrections.    In the case the patient eloped or refused treatment/admission, we offered to the best of our ability to provide care to the patient at the time of this encounter.    Amount and/or Complexity of Data Reviewed  External Data Reviewed: labs and notes.        Procedure  Procedures     Adriana Colorado MD  02/06/24 0834       Adriana Colorado MD  02/06/24 0831

## 2024-02-08 ENCOUNTER — TREATMENT (OUTPATIENT)
Dept: PHYSICAL THERAPY | Facility: CLINIC | Age: 69
End: 2024-02-08
Payer: COMMERCIAL

## 2024-02-08 DIAGNOSIS — Z98.1 S/P LUMBAR FUSION: ICD-10-CM

## 2024-02-08 DIAGNOSIS — N18.31 STAGE 3A CHRONIC KIDNEY DISEASE (MULTI): Primary | ICD-10-CM

## 2024-02-08 DIAGNOSIS — M54.16 RIGHT LUMBAR RADICULOPATHY: Primary | ICD-10-CM

## 2024-02-08 PROCEDURE — 97110 THERAPEUTIC EXERCISES: CPT | Mod: GP

## 2024-02-08 PROCEDURE — 97140 MANUAL THERAPY 1/> REGIONS: CPT | Mod: GP

## 2024-02-08 PROCEDURE — 97014 ELECTRIC STIMULATION THERAPY: CPT | Mod: GP

## 2024-02-08 NOTE — PROGRESS NOTES
"Physical Therapy Treatment Note    Patient Name: Angel Jordan  MRN Number: [id number]  Initial Examination Date: 12/22/23  Referring Provider: Loly Weiss PA-C   Evaluating Clinician: ABIGAIL Dye PT  ONSET DATE: 11/2/23 DOS    Today's Date: 2/8/2024  Time Calculation  Start Time: 0800  Stop Time: 0845  Time Calculation (min): 45 min    INSURANCE:  Visit Number: 9  Approved Visits: 40   Insurance Info: EGP - No auth. 40 visits (PT/OT comb.) 100% coverage after deductible. Deduct $1500   Insurance Auth Dates: NA  CMS Certification Period: NA    PRECAUTIONS AND ANY SPECIAL CONCERNS: Okay to lift up to 25 pounds until 8 weeks postop, then may increase lifting as tolerated. Continue to limit excessive bending/lifting/twisting at the waist, however okay to do gentle ROM exercises to help reduce stiffness & increase mobility. No strenuous activity such as running/jumping/heavy lifting/activities that strain low back until 8 weeks postop. Encourage ambulating as tolerated.      Surgical history: L/R TKR, Left TSR, CTR on right, hernia repair,     PT CLINICAL PROBLEM: Lumbar Radiculopathy  M54.16 Status Post L5-S1 Anterior Lumbar Fusion 11/2/23 DOS Z98.1    PROBLEM LIST: Chief Dx code:   1. Right lumbar radiculopathy  Follow Up In Physical Therapy      2. S/P lumbar fusion  Follow Up In Physical Therapy          SUBJECTIVE: States before surgery had RLE numbness dhara. When standing and this was the most concerning issue. Now is having increased LBP post operatively and the location he was feeling numbness in the RLE is now painful. He is 7 weeks and 1 day post op. 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 He is a golfer and also rides trike motorcycles. His goals are to \"get rid of the pain so I can go back to normal\" He has had very good results with past ortho surgeries but this one has been the most difficult. He " "gets very painful by 3pm in the afternoon. He gets some relief with sitting. His symptoms at worst are 7/10 and on average 3/10. His symptoms are worst in the right lateral thigh down the leg to the knee and shin. He has no symptoms at night to sleep. He does feel better to lay down.     1/25/24: States he felt better for 3 days after last visit. Had less intense RLE symptoms when standing and walking and when he sat down the symptoms went away altogether. Was very pleased.     2/1/24: Continues to be pleased with progress and feeling better.     2/8/24: In the mornings it is getting better and easing up but 1 pm in the afternoon it comes on but not as bad as it used to be. Sitting in the afternoon seems to ease symptoms more challenges by standing. Symptoms in right posterior thigh but \"it's getting less and less below the knee\" Does a lot better in the am.       TREATMENT:  Symptoms/NPRS before Rx:  1-2  Symptoms/NPRS after Rx: 1  Bike 5 min R2  Prone IFS 12 min right lumbo sacral/buttock with moist heat to low back  Massage  [facility]  Buttock stretch    Access Code: [email or web address]  URL: [email or web address]  Date: 12/28/2023  Prepared by: Olegario Dye    Exercises  - Hook Lying Buttock Stretch with One Leg Crossed Over the other  - 1-2 x daily - 3-5 reps - 15 hold  - Hooklying Single Knee to Chest Stretch  - 1-2 x daily - 3-5 reps - 15 hold  - Heel Raises with Counter Support  - 3-5 x weekly - 1-3 sets - 10 reps  - Calf Stretch Against Wall (Gastroc)  - 1-2 x daily - 3 reps - 30 hold  - Standing Calf Stretch on 2 by 4 Board  - 1-2 x daily - 3 reps - 30 hold  - Seated Hamstring Curl with Anchored Resistance  - 3-5 x weekly - 1-3 sets - 10 reps  - Seated Hip Abd/ER \"Band Outs\" Outer Hip Exercise  - 3-5 x weekly  - Seated Long Arc Quad with Ankle Weight  - 3-5 x weekly - 1-3 sets - 10 reps - 2 hold  - Sit to Stand Squat Movement  - 3 x weekly - 1-2 sets - 10 reps    OBJECTIVE:  OUTCOME MEASURE: KENNY 40% "   Supple full hip rom   SLR 70 clear NE  Hip scour -  PKB quad -   Noted atrophy right quad and glute max  Quad 4-  Glute max 4-  TA 4-  Lumbar mobility 25% not tested any further but no peripheralization  Tender greater troch mild  Right glute med 3+  SLS right has mod left pelvic drop and difficult to hold with whole leg shaking noted  Gait decreased control and stability left knee  Provided hand out of alarm system excitation levels.       ASSESSMENT: responds well to current program and symptoms are decreasing       PLAN: NV manual to lumbar and continue with PNE approach  Patient/parent/caregiver agreed and consented to plan of care for skilled physical therapy at 1 times per week for 8-10 weeks. Physical Therapy to include modalities prn as indicated, therapeutic exercise, manual therapy, neuromuscular re-education, gait and functional performance training, instruction and practice in a home exercise program (HEP) and self-management skills.        CARE PLAN/GOALS: (met, progressing, not progressing)  STG's: (weeks  4 / visits  4 )  Symptoms < 5 at 3 pm in afternoon  2. Right LE quad and TA strength increase to 4+/5 so pt. Able to stand on right leg > 10 count with good stability and pelvifemoral control  LTG's: (weeks  10 /visits  10 )  3. Able to walk steps reciprocally with good strength and knee control  4. Minimal RLE/thight to knee symptoms in pm  5. The Global Rating of Change Score (GROC) will improve to 5/7 =  “a good deal better” or more.   6. Improve functional outcome tool score (FOTS: KENNY, NDI, ASES, ABC, etc.) by > 10 points for Minimally Important Clinical Difference (MICD).  7. Patient/client will be independent with a HEP and self-management skills to further enhance recovery and progress.  8. Patient/client will verbalize >75% symptom reduction for frequency and severity of symptoms and/or > two point reduction of VAS/NPRS.  9. The Patient Specific Functional Scale metric (PSFS) will improve  from baseline value to greater than 80% functional.

## 2024-02-12 DIAGNOSIS — M54.16 LUMBAR RADICULOPATHY: ICD-10-CM

## 2024-02-12 DIAGNOSIS — M62.838 MUSCLE SPASM: ICD-10-CM

## 2024-02-12 RX ORDER — GABAPENTIN 300 MG/1
600 CAPSULE ORAL 2 TIMES DAILY
Qty: 180 CAPSULE | Refills: 1 | Status: SHIPPED | OUTPATIENT
Start: 2024-02-12 | End: 2024-06-04 | Stop reason: SDUPTHER

## 2024-02-15 ENCOUNTER — APPOINTMENT (OUTPATIENT)
Dept: PHYSICAL THERAPY | Facility: CLINIC | Age: 69
End: 2024-02-15
Payer: COMMERCIAL

## 2024-02-16 ENCOUNTER — TREATMENT (OUTPATIENT)
Dept: PHYSICAL THERAPY | Facility: CLINIC | Age: 69
End: 2024-02-16
Payer: COMMERCIAL

## 2024-02-16 DIAGNOSIS — M54.16 RIGHT LUMBAR RADICULOPATHY: ICD-10-CM

## 2024-02-16 DIAGNOSIS — Z98.1 S/P LUMBAR FUSION: ICD-10-CM

## 2024-02-16 PROCEDURE — 97014 ELECTRIC STIMULATION THERAPY: CPT | Mod: GP

## 2024-02-16 PROCEDURE — 97110 THERAPEUTIC EXERCISES: CPT | Mod: GP

## 2024-02-16 PROCEDURE — 97140 MANUAL THERAPY 1/> REGIONS: CPT | Mod: GP

## 2024-02-16 NOTE — PROGRESS NOTES
"Physical Therapy Treatment Note    Patient Name: Angel Jordan  MRN Number: [id number]  Initial Examination Date: 12/22/23  Referring Provider: Loly Weiss PA-C   Evaluating Clinician: ABIGAIL Dye PT  ONSET DATE: 11/2/23 DOS    Today's Date: 2/16/2024  Time Calculation  Start Time: 0830  Stop Time: 0915  Time Calculation (min): 45 min    INSURANCE:  Visit Number: 10  Approved Visits: 40   Insurance Info: EGP - No auth. 40 visits (PT/OT comb.) 100% coverage after deductible. Deduct $1500   Insurance Auth Dates: NA  CMS Certification Period: NA    PRECAUTIONS AND ANY SPECIAL CONCERNS: Okay to lift up to 25 pounds until 8 weeks postop, then may increase lifting as tolerated. Continue to limit excessive bending/lifting/twisting at the waist, however okay to do gentle ROM exercises to help reduce stiffness & increase mobility. No strenuous activity such as running/jumping/heavy lifting/activities that strain low back until 8 weeks postop. Encourage ambulating as tolerated.      Surgical history: L/R TKR, Left TSR, CTR on right, hernia repair,     PT CLINICAL PROBLEM: Lumbar Radiculopathy  M54.16 Status Post L5-S1 Anterior Lumbar Fusion 11/2/23 DOS Z98.1    PROBLEM LIST: Chief Dx code:   1. Right lumbar radiculopathy  Follow Up In Physical Therapy      2. S/P lumbar fusion  Follow Up In Physical Therapy          SUBJECTIVE: States before surgery had RLE numbness dhara. When standing and this was the most concerning issue. Now is having increased LBP post operatively and the location he was feeling numbness in the RLE is now painful. He is 7 weeks and 1 day post op. 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 He is a golfer and also rides trike motorcycles. His goals are to \"get rid of the pain so I can go back to normal\" He has had very good results with past ortho surgeries but this one has been the most difficult. " "He gets very painful by 3pm in the afternoon. He gets some relief with sitting. His symptoms at worst are 7/10 and on average 3/10. His symptoms are worst in the right lateral thigh down the leg to the knee and shin. He has no symptoms at night to sleep. He does feel better to lay down.     2/16/24: Having some symptoms below the right knee past 3-5 days. Has been trying to \"bicycle\" in the hot tub some and not sure if this irritated the RLE. Less RLE \"weakness\" and no longer \"giving out\"       TREATMENT:  Symptoms/NPRS before Rx:   Symptoms/NPRS after Rx:   Timed Codes: (# minutes per modality and 0 if omitted)  TE:  15   NMRE-ED:     Gait:      Manual:  15    Stim: 12    Bike 5 min R3  Seated ball rolls 2 x 10 lumbar rom  Slantboard calf stretch  Heel raises 10 reps  Sit to stand squats 10 reps  IFS lumbar 12 min right buttock area 2 channels  Moist heat lumbar  STM/Massage  [facility]  Buttock stretch      Access Code: [email or web address]  URL: [email or web address]  Date: 12/28/2023  Prepared by: Olegario Dye    Exercises  - Hook Lying Buttock Stretch with One Leg Crossed Over the other  - 1-2 x daily - 3-5 reps - 15 hold  - Hooklying Single Knee to Chest Stretch  - 1-2 x daily - 3-5 reps - 15 hold  - Heel Raises with Counter Support  - 3-5 x weekly - 1-3 sets - 10 reps  - Calf Stretch Against Wall (Gastroc)  - 1-2 x daily - 3 reps - 30 hold  - Standing Calf Stretch on 2 by 4 Board  - 1-2 x daily - 3 reps - 30 hold  - Seated Hamstring Curl with Anchored Resistance  - 3-5 x weekly - 1-3 sets - 10 reps  - Seated Hip Abd/ER \"Band Outs\" Outer Hip Exercise  - 3-5 x weekly  - Seated Long Arc Quad with Ankle Weight  - 3-5 x weekly - 1-3 sets - 10 reps - 2 hold  - Sit to Stand Squat Movement  - 3 x weekly - 1-2 sets - 10 reps    OBJECTIVE:  OUTCOME MEASURE: KENNY 40%   Supple full hip rom   SLR 70 clear NE  Hip scour -  PKB quad -   Noted atrophy right quad and glute max  Quad 4-  Glute max 4-  TA 4-  Lumbar mobility 25% not " tested any further but no peripheralization  Tender greater troch mild  Right glute med 3+  SLS right has mod left pelvic drop and difficult to hold with whole leg shaking noted  Gait decreased control and stability left knee  Provided hand out of alarm system excitation levels.       ASSESSMENT: responds well to current program and symptoms are decreasing       PLAN: NV manual to lumbar and continue with PNE approach  Patient/parent/caregiver agreed and consented to plan of care for skilled physical therapy at 1 times per week for 8-10 weeks. Physical Therapy to include modalities prn as indicated, therapeutic exercise, manual therapy, neuromuscular re-education, gait and functional performance training, instruction and practice in a home exercise program (HEP) and self-management skills.        CARE PLAN/GOALS: (met, progressing, not progressing)  STG's: (weeks  4 / visits  4 )  Symptoms < 5 at 3 pm in afternoon  2. Right LE quad and TA strength increase to 4+/5 so pt. Able to stand on right leg > 10 count with good stability and pelvifemoral control  LTG's: (weeks  10 /visits  10 )  3. Able to walk steps reciprocally with good strength and knee control  4. Minimal RLE/thight to knee symptoms in pm  5. The Global Rating of Change Score (GROC) will improve to 5/7 =  “a good deal better” or more.   6. Improve functional outcome tool score (FOTS: KENNY, NDI, ASES, ABC, etc.) by > 10 points for Minimally Important Clinical Difference (MICD).  7. Patient/client will be independent with a HEP and self-management skills to further enhance recovery and progress.  8. Patient/client will verbalize >75% symptom reduction for frequency and severity of symptoms and/or > two point reduction of VAS/NPRS.  9. The Patient Specific Functional Scale metric (PSFS) will improve from baseline value to greater than 80% functional.

## 2024-02-22 ENCOUNTER — APPOINTMENT (OUTPATIENT)
Dept: PHYSICAL THERAPY | Facility: CLINIC | Age: 69
End: 2024-02-22
Payer: COMMERCIAL

## 2024-02-23 ENCOUNTER — TREATMENT (OUTPATIENT)
Dept: PHYSICAL THERAPY | Facility: CLINIC | Age: 69
End: 2024-02-23
Payer: COMMERCIAL

## 2024-02-23 DIAGNOSIS — M54.16 RIGHT LUMBAR RADICULOPATHY: ICD-10-CM

## 2024-02-23 DIAGNOSIS — Z98.1 S/P LUMBAR FUSION: ICD-10-CM

## 2024-02-23 PROCEDURE — 97110 THERAPEUTIC EXERCISES: CPT | Mod: GP

## 2024-02-23 PROCEDURE — 97014 ELECTRIC STIMULATION THERAPY: CPT | Mod: GP

## 2024-02-23 NOTE — PROGRESS NOTES
"Physical Therapy Treatment Note    Patient Name: Angel Jordan  MRN Number: [id number]  Initial Examination Date: 12/22/23  Referring Provider: Loly Weiss PA-C   Evaluating Clinician: ABIGAIL Dye PT  ONSET DATE: 11/2/23 DOS    Today's Date: 2/23/2024       INSURANCE:  Visit Number: 10  Approved Visits: 40   Insurance Info: EGP - No auth. 40 visits (PT/OT comb.) 100% coverage after deductible. Deduct $1500   Insurance Auth Dates: NA  CMS Certification Period: NA    PRECAUTIONS AND ANY SPECIAL CONCERNS: Okay to lift up to 25 pounds until 8 weeks postop, then may increase lifting as tolerated. Continue to limit excessive bending/lifting/twisting at the waist, however okay to do gentle ROM exercises to help reduce stiffness & increase mobility. No strenuous activity such as running/jumping/heavy lifting/activities that strain low back until 8 weeks postop. Encourage ambulating as tolerated.      Surgical history: L/R TKR, Left TSR, CTR on right, hernia repair,     PT CLINICAL PROBLEM: Lumbar Radiculopathy  M54.16 Status Post L5-S1 Anterior Lumbar Fusion 11/2/23 DOS Z98.1    PROBLEM LIST: Chief Dx code:   1. Right lumbar radiculopathy  Follow Up In Physical Therapy      2. S/P lumbar fusion  Follow Up In Physical Therapy          SUBJECTIVE: States before surgery had RLE numbness dhara. When standing and this was the most concerning issue. Now is having increased LBP post operatively and the location he was feeling numbness in the RLE is now painful. He is 7 weeks and 1 day post op. 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 He is a golfer and also rides trike motorcycles. His goals are to \"get rid of the pain so I can go back to normal\" He has had very good results with past ortho surgeries but this one has been the most difficult. He gets very painful by 3pm in the afternoon. He gets some relief with sitting. His " symptoms at worst are 7/10 and on average 3/10. His symptoms are worst in the right lateral thigh down the leg to the knee and shin. He has no symptoms at night to sleep. He does feel better to lay down.     2/23/24: ~ 15 weeks post op continued right lower lateral leg tingling and pain but not as frequent or as severe. Less weakness in RLE. Hopeful his pain will improve and trying to remain optimistic. Felt very good on Monday and able to walk 3 miles at work.       TREATMENT:  Symptoms/NPRS before Rx: 5  Symptoms/NPRS after Rx: 3  Timed Codes: (# minutes per modality and 0 if omitted)  TE:  30   NMRE-ED:     Gait:      Manual:      Stim: 12    Seated nerve glides KE/DF Inv/Ev  Slant calf stretch  Sit to stand squats 10 reps  Seated banded DF 3 x 10 each  EDU nerve glides, flexibility, strengthening, FIT and guidelines  Discussed nerve reactivity and distal weakness and expectations  Provided hand out see below:  Exercise and Activity/Home Program Guidelines Overview  Stim pre-mod 2 channels 1 on buttock/lumbar other on lateral ham/shin area x 12    Joint Range of Motion ROM “Mobility”  Range of motion is purposeful movement to the joints with the intention of loosening the joint up and creating more motion. It is often used for stiff and tight joints and sometimes even when a joint area is symptomatic. ROM can be started with light stretches for 5-10 seconds about 10-20 reps and under some circumstances stronger stretches will need to be held for 30 seconds and repeated 3-5 times. It depends on how symptomatic the area is, tolerance, and how tight the joint is. Generally, ROM is done 1-3 times per day based on need and results.    Muscle Flexibility/Stretching  Stretches for muscle flexibility are usually held for 30 seconds and repeated 1-3 times based on the need. If the muscle is very tight more stretches are indicated if symptoms and tolerance allow. Generally, muscle stretching is done 3-5 times per week. If  the muscle is very tight and not painful 5/week is better until it becomes looser. If the muscle is just moderately tight 3/week will do. Stretching is not usually needed if the muscle is not tight.     Strengthening and “Stability” Training  Exercises that are given for the purpose of strengthening usually should be done every other day hence 3 times per week. Sometimes, in the earlier stages of recovery when the intensity of the strengthening activity is low, the exercises can be done more often up to 5 days per week but you should have some days for rest and recovery. Generally, the harder the exertion on the muscle (intensity) and the more sore the muscle is after training the more time you need to rest between strengthening sessions.     Endurance/Cardiorespiratory Training  These would be activities that get your heart rate and breathing rate up but also could include lengthening the amount of time you stand or walk, or performing home tasks for longer amounts of time, or walking, stationary biking, swimming, etc. Usually 3-5 times per week.    Postural, Walking, and Movement Re-Training Activities: (Habits)  Postural re-training activities are better done many times per day to instill better habits and to take the stress and strain off the joints and muscles. If your aim is to have better posture or bodily alignment you may need to change positions often, do light stretches periodically, and other activities we will show you.     Soreness Rules for Exercise and Normal Daily Activities:  No pain > 5/10 with activity, stretches, or strengthening  No lasting symptoms: usually symptoms should ease pretty quickly after exercise   No sharp pain with exercise or activity     Access Code: [email or web address]  URL: [email or web address]  Date: 12/28/2023  Prepared by: Olegario Dye    Exercises  - Hook Lying Buttock Stretch with One Leg Crossed Over the other  - 1-2 x daily - 3-5 reps - 15 hold  - Hooklying Single Knee to  "Chest Stretch  - 1-2 x daily - 3-5 reps - 15 hold  - Heel Raises with Counter Support  - 3-5 x weekly - 1-3 sets - 10 reps  - Calf Stretch Against Wall (Gastroc)  - 1-2 x daily - 3 reps - 30 hold  - Standing Calf Stretch on 2 by 4 Board  - 1-2 x daily - 3 reps - 30 hold  - Seated Hamstring Curl with Anchored Resistance  - 3-5 x weekly - 1-3 sets - 10 reps  - Seated Hip Abd/ER \"Band Outs\" Outer Hip Exercise  - 3-5 x weekly  - Seated Long Arc Quad with Ankle Weight  - 3-5 x weekly - 1-3 sets - 10 reps - 2 hold  - Sit to Stand Squat Movement  - 3 x weekly - 1-2 sets - 10 reps    OBJECTIVE:  OUTCOME MEASURE: KENNY 40%   Supple full hip rom   SLR 70 clear NE  Hip scour -  PKB quad -   Noted atrophy right quad and glute max  Quad 4-  Glute max 4-  TA 4-  Lumbar mobility 25% not tested any further but no peripheralization  Tender greater troch mild  Right glute med 3+  SLS right has mod left pelvic drop and difficult to hold with whole leg shaking noted  Gait decreased control and stability left knee  Provided hand out of alarm system excitation levels.     2/23/24:  KE 4+  Ham 4+  Ev 4+  DF 4- RLE      ASSESSMENT: Improved quad strength and still L4/L5 TA weakness strength deficits and neural reactivity noted     PLAN: NV manual to lumbar and continue with PNE approach nerve glides positive support graded movement \"nudging\" along activity tolerance and function.     Patient/parent/caregiver agreed and consented to plan of care for skilled physical therapy at 1 times per week for 8-10 weeks. Physical Therapy to include modalities prn as indicated, therapeutic exercise, manual therapy, neuromuscular re-education, gait and functional performance training, instruction and practice in a home exercise program (HEP) and self-management skills.        CARE PLAN/GOALS: (met, progressing, not progressing)  STG's: (weeks  4 / visits  4 )  Symptoms < 5 at 3 pm in afternoon  2. Right LE quad and TA strength increase to 4+/5 so pt. Able " to stand on right leg > 10 count with good stability and pelvifemoral control  LTG's: (weeks  10 /visits  10 )  3. Able to walk steps reciprocally with good strength and knee control  4. Minimal RLE/thight to knee symptoms in pm  5. The Global Rating of Change Score (GROC) will improve to 5/7 =  “a good deal better” or more.   6. Improve functional outcome tool score (FOTS: KENNY, NDI, ASES, ABC, etc.) by > 10 points for Minimally Important Clinical Difference (MICD).  7. Patient/client will be independent with a HEP and self-management skills to further enhance recovery and progress.  8. Patient/client will verbalize >75% symptom reduction for frequency and severity of symptoms and/or > two point reduction of VAS/NPRS.  9. The Patient Specific Functional Scale metric (PSFS) will improve from baseline value to greater than 80% functional.

## 2024-02-29 ENCOUNTER — APPOINTMENT (OUTPATIENT)
Dept: PHYSICAL THERAPY | Facility: CLINIC | Age: 69
End: 2024-02-29
Payer: COMMERCIAL

## 2024-03-01 ENCOUNTER — TREATMENT (OUTPATIENT)
Dept: PHYSICAL THERAPY | Facility: CLINIC | Age: 69
End: 2024-03-01
Payer: COMMERCIAL

## 2024-03-01 DIAGNOSIS — M54.16 RIGHT LUMBAR RADICULOPATHY: Primary | ICD-10-CM

## 2024-03-01 DIAGNOSIS — Z98.1 S/P LUMBAR FUSION: ICD-10-CM

## 2024-03-01 PROCEDURE — 97014 ELECTRIC STIMULATION THERAPY: CPT | Mod: GP

## 2024-03-01 PROCEDURE — 97110 THERAPEUTIC EXERCISES: CPT | Mod: GP

## 2024-03-01 NOTE — PROGRESS NOTES
"Physical Therapy Treatment Note    Patient Name: Angel Jordan  MRN Number: [id number]  Initial Examination Date: 12/22/23  Referring Provider: Loly Weiss PA-C   Evaluating Clinician: ABIGAIL Dye PT  ONSET DATE: 11/2/23 DOS    Today's Date: 3/1/2024  Time Calculation  Start Time: 0900  Stop Time: 0946  Time Calculation (min): 46 min    INSURANCE:  Visit Number: 11  Approved Visits: 40   Insurance Info: EGP - No auth. 40 visits (PT/OT comb.) 100% coverage after deductible. Deduct $1500   Insurance Auth Dates: NA  CMS Certification Period: NA    PRECAUTIONS AND ANY SPECIAL CONCERNS: Okay to lift up to 25 pounds until 8 weeks postop, then may increase lifting as tolerated. Continue to limit excessive bending/lifting/twisting at the waist, however okay to do gentle ROM exercises to help reduce stiffness & increase mobility. No strenuous activity such as running/jumping/heavy lifting/activities that strain low back until 8 weeks postop. Encourage ambulating as tolerated.      Surgical history: L/R TKR, Left TSR, CTR on right, hernia repair,     PT CLINICAL PROBLEM: Lumbar Radiculopathy  M54.16 Status Post L5-S1 Anterior Lumbar Fusion 11/2/23 DOS Z98.1    PROBLEM LIST: Chief Dx code:   1. Right lumbar radiculopathy  Follow Up In Physical Therapy      2. S/P lumbar fusion  Follow Up In Physical Therapy            SUBJECTIVE: States before surgery had RLE numbness dhara. When standing and this was the most concerning issue. Now is having increased LBP post operatively and the location he was feeling numbness in the RLE is now painful. He is 7 weeks and 1 day post op. 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 He is a golfer and also rides trike motorcycles. His goals are to \"get rid of the pain so I can go back to normal\" He has had very good results with past ortho surgeries but this one has been the most difficult. " "He gets very painful by 3pm in the afternoon. He gets some relief with sitting. His symptoms at worst are 7/10 and on average 3/10. His symptoms are worst in the right lateral thigh down the leg to the knee and shin. He has no symptoms at night to sleep. He does feel better to lay down.     2/23/24: ~ 15 weeks post op continued right lower lateral leg tingling and pain but not as frequent or as severe. Less weakness in RLE. Hopeful his pain will improve and trying to remain optimistic. Felt very good on Monday and able to walk 3 miles at work.     3/1/24: walking has become a little bit easier and less painful has a \"knot\" in the right low back       TREATMENT:  Symptoms/NPRS before Rx: 5  Symptoms/NPRS after Rx: 3  Timed Codes: (# minutes per modality and 0 if omitted)  TE:  30   NMRE-ED:     Gait:      Manual:      Stim: 12    Seated nerve glides KE/DF Inv/Ev reviewed today and practiced for \"correct\" performance  Seated sciatic flossing  Slant calf stretch  Sit to stand squats 10 reps  Seated banded DF 3 x 10 each  EDU nerve glides, flexibility, strengthening, FIT and guidelines  Discussed nerve reactivity and distal weakness and expectations  Provided hand out see below:  Exercise and Activity/Home Program Guidelines Overview  Stim pre-mod 2 channels 1 on buttock/lumbar other on lateral ham/shin area x 12  PROM right hip   Buttock stretch      Joint Range of Motion ROM “Mobility”  Range of motion is purposeful movement to the joints with the intention of loosening the joint up and creating more motion. It is often used for stiff and tight joints and sometimes even when a joint area is symptomatic. ROM can be started with light stretches for 5-10 seconds about 10-20 reps and under some circumstances stronger stretches will need to be held for 30 seconds and repeated 3-5 times. It depends on how symptomatic the area is, tolerance, and how tight the joint is. Generally, ROM is done 1-3 times per day based on need " and results.    Muscle Flexibility/Stretching  Stretches for muscle flexibility are usually held for 30 seconds and repeated 1-3 times based on the need. If the muscle is very tight more stretches are indicated if symptoms and tolerance allow. Generally, muscle stretching is done 3-5 times per week. If the muscle is very tight and not painful 5/week is better until it becomes looser. If the muscle is just moderately tight 3/week will do. Stretching is not usually needed if the muscle is not tight.     Strengthening and “Stability” Training  Exercises that are given for the purpose of strengthening usually should be done every other day hence 3 times per week. Sometimes, in the earlier stages of recovery when the intensity of the strengthening activity is low, the exercises can be done more often up to 5 days per week but you should have some days for rest and recovery. Generally, the harder the exertion on the muscle (intensity) and the more sore the muscle is after training the more time you need to rest between strengthening sessions.     Endurance/Cardiorespiratory Training  These would be activities that get your heart rate and breathing rate up but also could include lengthening the amount of time you stand or walk, or performing home tasks for longer amounts of time, or walking, stationary biking, swimming, etc. Usually 3-5 times per week.    Postural, Walking, and Movement Re-Training Activities: (Habits)  Postural re-training activities are better done many times per day to instill better habits and to take the stress and strain off the joints and muscles. If your aim is to have better posture or bodily alignment you may need to change positions often, do light stretches periodically, and other activities we will show you.     Soreness Rules for Exercise and Normal Daily Activities:  No pain > 5/10 with activity, stretches, or strengthening  No lasting symptoms: usually symptoms should ease pretty quickly  "after exercise   No sharp pain with exercise or activity     Access Code: [email or web address]  URL: [email or web address]  Date: 12/28/2023  Prepared by: Olegario Dye    Exercises  - Hook Lying Buttock Stretch with One Leg Crossed Over the other  - 1-2 x daily - 3-5 reps - 15 hold  - Hooklying Single Knee to Chest Stretch  - 1-2 x daily - 3-5 reps - 15 hold  - Heel Raises with Counter Support  - 3-5 x weekly - 1-3 sets - 10 reps  - Calf Stretch Against Wall (Gastroc)  - 1-2 x daily - 3 reps - 30 hold  - Standing Calf Stretch on 2 by 4 Board  - 1-2 x daily - 3 reps - 30 hold  - Seated Hamstring Curl with Anchored Resistance  - 3-5 x weekly - 1-3 sets - 10 reps  - Seated Hip Abd/ER \"Band Outs\" Outer Hip Exercise  - 3-5 x weekly  - Seated Long Arc Quad with Ankle Weight  - 3-5 x weekly - 1-3 sets - 10 reps - 2 hold  - Sit to Stand Squat Movement  - 3 x weekly - 1-2 sets - 10 reps    OBJECTIVE:  OUTCOME MEASURE: KENNY 40%   Supple full hip rom   SLR 70 clear NE  Hip scour -  PKB quad -   Noted atrophy right quad and glute max  Quad 4-  Glute max 4-  TA 4-  Lumbar mobility 25% not tested any further but no peripheralization  Tender greater troch mild  Right glute med 3+  SLS right has mod left pelvic drop and difficult to hold with whole leg shaking noted  Gait decreased control and stability left knee  Provided hand out of alarm system excitation levels.     2/23/24:  KE 4+  Ham 4+  Ev 4+  DF 4- RLE      ASSESSMENT: intermediate neural reactivity gets right gluteal pain with nerve tensioners but not glides     PLAN: NV manual to lumbar and continue with PNE approach nerve glides positive support graded movement \"nudging\" along activity tolerance and function.     Patient/parent/caregiver agreed and consented to plan of care for skilled physical therapy at 1 times per week for 8-10 weeks. Physical Therapy to include modalities prn as indicated, therapeutic exercise, manual therapy, neuromuscular re-education, gait and " functional performance training, instruction and practice in a home exercise program (HEP) and self-management skills.        CARE PLAN/GOALS: (met, progressing, not progressing)  STG's: (weeks  4 / visits  4 )  Symptoms < 5 at 3 pm in afternoon  2. Right LE quad and TA strength increase to 4+/5 so pt. Able to stand on right leg > 10 count with good stability and pelvifemoral control  LTG's: (weeks  10 /visits  10 )  3. Able to walk steps reciprocally with good strength and knee control  4. Minimal RLE/thight to knee symptoms in pm  5. The Global Rating of Change Score (GROC) will improve to 5/7 =  “a good deal better” or more.   6. Improve functional outcome tool score (FOTS: KENNY, NDI, ASES, ABC, etc.) by > 10 points for Minimally Important Clinical Difference (MICD).  7. Patient/client will be independent with a HEP and self-management skills to further enhance recovery and progress.  8. Patient/client will verbalize >75% symptom reduction for frequency and severity of symptoms and/or > two point reduction of VAS/NPRS.  9. The Patient Specific Functional Scale metric (PSFS) will improve from baseline value to greater than 80% functional.

## 2024-03-06 DIAGNOSIS — I10 ESSENTIAL (PRIMARY) HYPERTENSION: ICD-10-CM

## 2024-03-06 RX ORDER — LISINOPRIL 10 MG/1
10 TABLET ORAL DAILY
Qty: 90 TABLET | Refills: 0 | Status: SHIPPED | OUTPATIENT
Start: 2024-03-06 | End: 2024-06-03 | Stop reason: SDUPTHER

## 2024-03-08 ENCOUNTER — TREATMENT (OUTPATIENT)
Dept: PHYSICAL THERAPY | Facility: CLINIC | Age: 69
End: 2024-03-08
Payer: COMMERCIAL

## 2024-03-08 DIAGNOSIS — Z98.1 S/P LUMBAR FUSION: ICD-10-CM

## 2024-03-08 DIAGNOSIS — M54.16 RIGHT LUMBAR RADICULOPATHY: Primary | ICD-10-CM

## 2024-03-08 PROCEDURE — 97110 THERAPEUTIC EXERCISES: CPT | Mod: GP

## 2024-03-08 PROCEDURE — 97014 ELECTRIC STIMULATION THERAPY: CPT | Mod: GP

## 2024-03-08 NOTE — PROGRESS NOTES
"Physical Therapy Treatment Note    Patient Name: Angel Jordan  MRN Number: [id number]  Initial Examination Date: 12/22/23  Referring Provider: Loly Farrell  Evaluating Clinician: ABIGAIL Dye PT  ONSET DATE: 11/2/23 DOS    Today's Date: 3/8/2024  Time Calculation  Start Time: 0800  Stop Time: 0845  Time Calculation (min): 45 min    INSURANCE:  Visit Number: 12  Approved Visits: 40   Insurance Info: EGP - No auth. 40 visits (PT/OT comb.) 100% coverage after deductible. Deduct $1500   Insurance Auth Dates: NA  CMS Certification Period: NA    PRECAUTIONS AND ANY SPECIAL CONCERNS: Okay to lift up to 25 pounds until 8 weeks postop, then may increase lifting as tolerated. Continue to limit excessive bending/lifting/twisting at the waist, however okay to do gentle ROM exercises to help reduce stiffness & increase mobility. No strenuous activity such as running/jumping/heavy lifting/activities that strain low back until 8 weeks postop. Encourage ambulating as tolerated.      Surgical history: L/R TKR, Left TSR, CTR on right, hernia repair,     PT CLINICAL PROBLEM: Lumbar Radiculopathy  M54.16 Status Post L5-S1 Anterior Lumbar Fusion 11/2/23 DOS Z98.1    PROBLEM LIST: Chief Dx code:   1. Right lumbar radiculopathy  Follow Up In Physical Therapy      2. S/P lumbar fusion  Follow Up In Physical Therapy              SUBJECTIVE: States before surgery had RLE numbness dhara. When standing and this was the most concerning issue. Now is having increased LBP post operatively and the location he was feeling numbness in the RLE is now painful. He is 7 weeks and 1 day post op. 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 He is a golfer and also rides trike motorcycles. His goals are to \"get rid of the pain so I can go back to normal\" He has had very good results with past ortho surgeries but this one has been the " "most difficult. He gets very painful by 3pm in the afternoon. He gets some relief with sitting. His symptoms at worst are 7/10 and on average 3/10. His symptoms are worst in the right lateral thigh down the leg to the knee and shin. He has no symptoms at night to sleep. He does feel better to lay down.     2/23/24: ~ 15 weeks post op continued right lower lateral leg tingling and pain but not as frequent or as severe. Less weakness in RLE. Hopeful his pain will improve and trying to remain optimistic. Felt very good on Monday and able to walk 3 miles at work.     3/1/24: walking has become a little bit easier and less painful has a \"knot\" in the right low back     3/8/24: the pain is transitioning its getting less in the leg but the numbness continues and I get back soreness as the day goes on. 5/10      TREATMENT:  Symptoms/NPRS before Rx: 5  Symptoms/NPRS after Rx: 3  Timed Codes: (# minutes per modality and 0 if omitted)  TE:    NMRE-ED:     Gait:      Manual:      Stim:     Seated YTB DF 3 x 10  BTB PF 3 x 10  Long sitting strap calf stretch 2 x 30 sec  Sit to stand squats 10 reps  TDM walk 3 min 1.4 MPH  Bike 5 min seat 11 R3  PNE Edu \"pain is more than just tissue damage\"   IFS right buttock/lumbar 15 min  Heat      Joint Range of Motion ROM “Mobility”  Range of motion is purposeful movement to the joints with the intention of loosening the joint up and creating more motion. It is often used for stiff and tight joints and sometimes even when a joint area is symptomatic. ROM can be started with light stretches for 5-10 seconds about 10-20 reps and under some circumstances stronger stretches will need to be held for 30 seconds and repeated 3-5 times. It depends on how symptomatic the area is, tolerance, and how tight the joint is. Generally, ROM is done 1-3 times per day based on need and results.    Muscle Flexibility/Stretching  Stretches for muscle flexibility are usually held for 30 seconds and repeated 1-3 " times based on the need. If the muscle is very tight more stretches are indicated if symptoms and tolerance allow. Generally, muscle stretching is done 3-5 times per week. If the muscle is very tight and not painful 5/week is better until it becomes looser. If the muscle is just moderately tight 3/week will do. Stretching is not usually needed if the muscle is not tight.     Strengthening and “Stability” Training  Exercises that are given for the purpose of strengthening usually should be done every other day hence 3 times per week. Sometimes, in the earlier stages of recovery when the intensity of the strengthening activity is low, the exercises can be done more often up to 5 days per week but you should have some days for rest and recovery. Generally, the harder the exertion on the muscle (intensity) and the more sore the muscle is after training the more time you need to rest between strengthening sessions.     Endurance/Cardiorespiratory Training  These would be activities that get your heart rate and breathing rate up but also could include lengthening the amount of time you stand or walk, or performing home tasks for longer amounts of time, or walking, stationary biking, swimming, etc. Usually 3-5 times per week.    Postural, Walking, and Movement Re-Training Activities: (Habits)  Postural re-training activities are better done many times per day to instill better habits and to take the stress and strain off the joints and muscles. If your aim is to have better posture or bodily alignment you may need to change positions often, do light stretches periodically, and other activities we will show you.     Soreness Rules for Exercise and Normal Daily Activities:  No pain > 5/10 with activity, stretches, or strengthening  No lasting symptoms: usually symptoms should ease pretty quickly after exercise   No sharp pain with exercise or activity     Access Code: [email or web address]  URL: [email or web address]  Date:  "12/28/2023  Prepared by: Olegario Dye    Exercises  - Hook Lying Buttock Stretch with One Leg Crossed Over the other  - 1-2 x daily - 3-5 reps - 15 hold  - Hooklying Single Knee to Chest Stretch  - 1-2 x daily - 3-5 reps - 15 hold  - Heel Raises with Counter Support  - 3-5 x weekly - 1-3 sets - 10 reps  - Calf Stretch Against Wall (Gastroc)  - 1-2 x daily - 3 reps - 30 hold  - Standing Calf Stretch on 2 by 4 Board  - 1-2 x daily - 3 reps - 30 hold  - Seated Hamstring Curl with Anchored Resistance  - 3-5 x weekly - 1-3 sets - 10 reps  - Seated Hip Abd/ER \"Band Outs\" Outer Hip Exercise  - 3-5 x weekly  - Seated Long Arc Quad with Ankle Weight  - 3-5 x weekly - 1-3 sets - 10 reps - 2 hold  - Sit to Stand Squat Movement  - 3 x weekly - 1-2 sets - 10 reps    OBJECTIVE:  OUTCOME MEASURE: KENNY 40%   Supple full hip rom   SLR 70 clear NE  Hip scour -  PKB quad -   Noted atrophy right quad and glute max  Quad 4-  Glute max 4-  TA 4-  Lumbar mobility 25% not tested any further but no peripheralization  Tender greater troch mild  Right glute med 3+  SLS right has mod left pelvic drop and difficult to hold with whole leg shaking noted  Gait decreased control and stability left knee  Provided hand out of alarm system excitation levels.     2/23/24:  KE 4+  Ham 4+  Ev 4+  DF 4- RLE      ASSESSMENT: Open to pain neuroscience discussion.     PLAN: NV manual to lumbar and continue with PNE approach nerve glides positive support graded movement \"nudging\" along activity tolerance and function.     Patient/parent/caregiver agreed and consented to plan of care for skilled physical therapy at 1 times per week for 8-10 weeks. Physical Therapy to include modalities prn as indicated, therapeutic exercise, manual therapy, neuromuscular re-education, gait and functional performance training, instruction and practice in a home exercise program (HEP) and self-management skills.        CARE PLAN/GOALS: (met, progressing, not progressing)  STG's: " (weeks  4 / visits  4 )  Symptoms < 5 at 3 pm in afternoon  2. Right LE quad and TA strength increase to 4+/5 so pt. Able to stand on right leg > 10 count with good stability and pelvifemoral control  LTG's: (weeks  10 /visits  10 )  3. Able to walk steps reciprocally with good strength and knee control  4. Minimal RLE/thight to knee symptoms in pm  5. The Global Rating of Change Score (GROC) will improve to 5/7 =  “a good deal better” or more.   6. Improve functional outcome tool score (FOTS: KENNY, NDI, ASES, ABC, etc.) by > 10 points for Minimally Important Clinical Difference (MICD).  7. Patient/client will be independent with a HEP and self-management skills to further enhance recovery and progress.  8. Patient/client will verbalize >75% symptom reduction for frequency and severity of symptoms and/or > two point reduction of VAS/NPRS.  9. The Patient Specific Functional Scale metric (PSFS) will improve from baseline value to greater than 80% functional.

## 2024-03-12 ENCOUNTER — OFFICE VISIT (OUTPATIENT)
Dept: PRIMARY CARE | Facility: CLINIC | Age: 69
End: 2024-03-12
Payer: COMMERCIAL

## 2024-03-12 VITALS
BODY MASS INDEX: 29.98 KG/M2 | SYSTOLIC BLOOD PRESSURE: 125 MMHG | WEIGHT: 191 LBS | HEART RATE: 62 BPM | OXYGEN SATURATION: 97 % | HEIGHT: 67 IN | DIASTOLIC BLOOD PRESSURE: 86 MMHG

## 2024-03-12 DIAGNOSIS — Z00.00 HEALTHCARE MAINTENANCE: ICD-10-CM

## 2024-03-12 DIAGNOSIS — J34.89 SORE NOSE: ICD-10-CM

## 2024-03-12 DIAGNOSIS — H60.501 ACUTE OTITIS EXTERNA OF RIGHT EAR, UNSPECIFIED TYPE: Primary | ICD-10-CM

## 2024-03-12 PROCEDURE — 3079F DIAST BP 80-89 MM HG: CPT

## 2024-03-12 PROCEDURE — 1126F AMNT PAIN NOTED NONE PRSNT: CPT

## 2024-03-12 PROCEDURE — 3074F SYST BP LT 130 MM HG: CPT

## 2024-03-12 PROCEDURE — 1123F ACP DISCUSS/DSCN MKR DOCD: CPT

## 2024-03-12 PROCEDURE — 1159F MED LIST DOCD IN RCRD: CPT

## 2024-03-12 PROCEDURE — 1157F ADVNC CARE PLAN IN RCRD: CPT

## 2024-03-12 PROCEDURE — 99214 OFFICE O/P EST MOD 30 MIN: CPT

## 2024-03-12 PROCEDURE — 1036F TOBACCO NON-USER: CPT

## 2024-03-12 RX ORDER — CIPROFLOXACIN AND DEXAMETHASONE 3; 1 MG/ML; MG/ML
4 SUSPENSION/ DROPS AURICULAR (OTIC) 2 TIMES DAILY
Qty: 7.5 ML | Refills: 0 | Status: SHIPPED | OUTPATIENT
Start: 2024-03-12 | End: 2024-03-19

## 2024-03-12 ASSESSMENT — ENCOUNTER SYMPTOMS
SINUS PRESSURE: 0
VOMITING: 0
SORE THROAT: 0
ABDOMINAL PAIN: 0
SINUS PAIN: 0
WHEEZING: 1
DIARRHEA: 1
DIZZINESS: 0
FEVER: 0
PALPITATIONS: 0
SHORTNESS OF BREATH: 0
FATIGUE: 0
MYALGIAS: 0
COUGH: 0
NAUSEA: 0
CHILLS: 0
BLOOD IN STOOL: 0
HEADACHES: 1

## 2024-03-12 ASSESSMENT — PATIENT HEALTH QUESTIONNAIRE - PHQ9
2. FEELING DOWN, DEPRESSED OR HOPELESS: NOT AT ALL
1. LITTLE INTEREST OR PLEASURE IN DOING THINGS: NOT AT ALL
SUM OF ALL RESPONSES TO PHQ9 QUESTIONS 1 AND 2: 0

## 2024-03-12 NOTE — PROGRESS NOTES
"Subjective   Patient ID: Angel Jordan is a 68 y.o. male who presents for Earache (X thursday), Wheezing, and Headache.    HPI   68 y.o. male with PMH remarkable for HTN, HLD, CKD3 who presents to the office today for right ear pain, wheezing, and headache since last Thursday (5 days). No else around him sick - maybe a few people at work sick. A little bit of diarrhea at onset of symptoms. States nose feels raw, but is not congested. Feels some postnasal drip. Does take daily allergy medication. Has not taken anything for his symptoms. Does have history of ear infections as an adult. Denies fever, chills, chest pain, SOB, N/V.     Review of Systems   Constitutional:  Negative for chills, fatigue and fever.   HENT:  Positive for ear pain and postnasal drip. Negative for congestion, ear discharge, nosebleeds, sinus pressure, sinus pain and sore throat.    Respiratory:  Positive for wheezing. Negative for cough and shortness of breath.    Cardiovascular:  Negative for chest pain and palpitations.   Gastrointestinal:  Positive for diarrhea. Negative for abdominal pain, blood in stool, nausea and vomiting.   Musculoskeletal:  Negative for myalgias.   Neurological:  Positive for headaches. Negative for dizziness.   All other systems reviewed and are negative.      Objective   /86   Pulse 62   Ht 1.702 m (5' 7\")   Wt 86.6 kg (191 lb)   SpO2 97%   BMI 29.91 kg/m²     Physical Exam  Vitals reviewed.   Constitutional:       Appearance: Normal appearance.   HENT:      Head: Normocephalic and atraumatic.      Right Ear: Tympanic membrane and external ear normal. Tenderness present. No drainage. Tympanic membrane is not erythematous or bulging.      Left Ear: Tympanic membrane, ear canal and external ear normal.      Ears:      Comments: Right canal erythematous with tragus tender to palpation.   Eyes:      Extraocular Movements: Extraocular movements intact.      Conjunctiva/sclera: Conjunctivae normal.      Pupils: " Pupils are equal, round, and reactive to light.   Cardiovascular:      Rate and Rhythm: Normal rate and regular rhythm.      Pulses: Normal pulses.      Heart sounds: Normal heart sounds.   Pulmonary:      Effort: Pulmonary effort is normal.      Breath sounds: Normal breath sounds.   Musculoskeletal:      Cervical back: Normal range of motion and neck supple.   Lymphadenopathy:      Cervical: No cervical adenopathy.   Neurological:      General: No focal deficit present.      Mental Status: He is alert and oriented to person, place, and time.   Psychiatric:         Mood and Affect: Mood normal.         Behavior: Behavior normal.         Thought Content: Thought content normal.         Judgment: Judgment normal.         Assessment/Plan   Problem List Items Addressed This Visit             ICD-10-CM    Acute otitis externa of right ear - Primary H60.501     - This seems to be acute otitis externa of the right ear. Will treat with ear drops. Patient states something similar has happened in the past and the ear drops helped.          Relevant Medications    ciprofloxacin-dexamethasone (CiproDEX) otic suspension    Sore nose J34.89     Other Visit Diagnoses         Codes    Healthcare maintenance     Z00.00    Relevant Orders    TSH with reflex to Free T4 if abnormal    Hemoglobin A1C    CBC and Auto Differential    Lipid Panel    Comprehensive Metabolic Panel    Vitamin D 25-Hydroxy,Total (for eval of Vitamin D levels)    Vitamin B12    Prostate Spec.Ag,Screen          - Recommend nasal saline, humidifier, and a dab of nasal vaseline to keep nose moist.  - Patient to call the office if symptoms do not improve in 5-7 days.   - Annual blood work placed.  - Follow up in 3-4 months for annual physical.

## 2024-03-12 NOTE — ASSESSMENT & PLAN NOTE
- This seems to be acute otitis externa of the right ear. Will treat with ear drops. Patient states something similar has happened in the past and the ear drops helped.

## 2024-03-14 ENCOUNTER — LAB (OUTPATIENT)
Dept: LAB | Facility: LAB | Age: 69
End: 2024-03-14
Payer: COMMERCIAL

## 2024-03-14 DIAGNOSIS — Z00.00 HEALTHCARE MAINTENANCE: ICD-10-CM

## 2024-03-14 DIAGNOSIS — N18.31 STAGE 3A CHRONIC KIDNEY DISEASE (MULTI): ICD-10-CM

## 2024-03-15 ENCOUNTER — APPOINTMENT (OUTPATIENT)
Dept: PHYSICAL THERAPY | Facility: CLINIC | Age: 69
End: 2024-03-15
Payer: COMMERCIAL

## 2024-03-15 ENCOUNTER — APPOINTMENT (OUTPATIENT)
Dept: LAB | Facility: LAB | Age: 69
End: 2024-03-15
Payer: COMMERCIAL

## 2024-03-15 LAB
25(OH)D3 SERPL-MCNC: 56 NG/ML (ref 30–100)
ALBUMIN SERPL BCP-MCNC: 4.3 G/DL (ref 3.4–5)
ALP SERPL-CCNC: 87 U/L (ref 33–136)
ALT SERPL W P-5'-P-CCNC: 15 U/L (ref 10–52)
ANION GAP SERPL CALC-SCNC: 10 MMOL/L (ref 10–20)
AST SERPL W P-5'-P-CCNC: 19 U/L (ref 9–39)
BASOPHILS # BLD AUTO: 0.06 X10*3/UL (ref 0–0.1)
BASOPHILS NFR BLD AUTO: 0.7 %
BILIRUB SERPL-MCNC: 0.5 MG/DL (ref 0–1.2)
BUN SERPL-MCNC: 20 MG/DL (ref 6–23)
CALCIUM SERPL-MCNC: 9.5 MG/DL (ref 8.6–10.3)
CHLORIDE SERPL-SCNC: 105 MMOL/L (ref 98–107)
CHOLEST SERPL-MCNC: 138 MG/DL (ref 0–199)
CHOLESTEROL/HDL RATIO: 4.6
CO2 SERPL-SCNC: 26 MMOL/L (ref 21–32)
CREAT SERPL-MCNC: 1.34 MG/DL (ref 0.5–1.3)
EGFRCR SERPLBLD CKD-EPI 2021: 58 ML/MIN/1.73M*2
EOSINOPHIL # BLD AUTO: 0.33 X10*3/UL (ref 0–0.7)
EOSINOPHIL NFR BLD AUTO: 3.7 %
ERYTHROCYTE [DISTWIDTH] IN BLOOD BY AUTOMATED COUNT: 13 % (ref 11.5–14.5)
EST. AVERAGE GLUCOSE BLD GHB EST-MCNC: 100 MG/DL
GLUCOSE SERPL-MCNC: 103 MG/DL (ref 74–99)
HBA1C MFR BLD: 5.1 %
HCT VFR BLD AUTO: 40.9 % (ref 41–52)
HDLC SERPL-MCNC: 29.7 MG/DL
HGB BLD-MCNC: 13.3 G/DL (ref 13.5–17.5)
IMM GRANULOCYTES # BLD AUTO: 0.05 X10*3/UL (ref 0–0.7)
IMM GRANULOCYTES NFR BLD AUTO: 0.6 % (ref 0–0.9)
LDLC SERPL CALC-MCNC: 55 MG/DL
LYMPHOCYTES # BLD AUTO: 2.09 X10*3/UL (ref 1.2–4.8)
LYMPHOCYTES NFR BLD AUTO: 23.3 %
MCH RBC QN AUTO: 28.9 PG (ref 26–34)
MCHC RBC AUTO-ENTMCNC: 32.5 G/DL (ref 32–36)
MCV RBC AUTO: 89 FL (ref 80–100)
MONOCYTES # BLD AUTO: 0.94 X10*3/UL (ref 0.1–1)
MONOCYTES NFR BLD AUTO: 10.5 %
NEUTROPHILS # BLD AUTO: 5.51 X10*3/UL (ref 1.2–7.7)
NEUTROPHILS NFR BLD AUTO: 61.2 %
NON HDL CHOLESTEROL: 108 MG/DL (ref 0–149)
NRBC BLD-RTO: 0 /100 WBCS (ref 0–0)
PHOSPHATE SERPL-MCNC: 3.8 MG/DL (ref 2.5–4.9)
PLATELET # BLD AUTO: 306 X10*3/UL (ref 150–450)
POTASSIUM SERPL-SCNC: 4.4 MMOL/L (ref 3.5–5.3)
PROT SERPL-MCNC: 6.8 G/DL (ref 6.4–8.2)
PSA SERPL-MCNC: 1.43 NG/ML
RBC # BLD AUTO: 4.6 X10*6/UL (ref 4.5–5.9)
SODIUM SERPL-SCNC: 137 MMOL/L (ref 136–145)
TRIGL SERPL-MCNC: 265 MG/DL (ref 0–149)
TSH SERPL-ACNC: 1.46 MIU/L (ref 0.44–3.98)
VIT B12 SERPL-MCNC: 646 PG/ML (ref 211–911)
VLDL: 53 MG/DL (ref 0–40)
WBC # BLD AUTO: 9 X10*3/UL (ref 4.4–11.3)

## 2024-03-15 PROCEDURE — 84153 ASSAY OF PSA TOTAL: CPT

## 2024-03-15 PROCEDURE — 82607 VITAMIN B-12: CPT

## 2024-03-15 PROCEDURE — 82306 VITAMIN D 25 HYDROXY: CPT

## 2024-03-15 PROCEDURE — 83036 HEMOGLOBIN GLYCOSYLATED A1C: CPT

## 2024-03-15 PROCEDURE — 36415 COLL VENOUS BLD VENIPUNCTURE: CPT

## 2024-03-19 ENCOUNTER — OFFICE VISIT (OUTPATIENT)
Dept: ORTHOPEDIC SURGERY | Facility: CLINIC | Age: 69
End: 2024-03-19
Payer: COMMERCIAL

## 2024-03-19 DIAGNOSIS — M54.16 LUMBAR RADICULOPATHY: Primary | ICD-10-CM

## 2024-03-19 DIAGNOSIS — Z98.1 STATUS POST LUMBAR SPINAL FUSION: ICD-10-CM

## 2024-03-19 PROCEDURE — 1157F ADVNC CARE PLAN IN RCRD: CPT | Performed by: PHYSICIAN ASSISTANT

## 2024-03-19 PROCEDURE — 1036F TOBACCO NON-USER: CPT | Performed by: PHYSICIAN ASSISTANT

## 2024-03-19 PROCEDURE — 1123F ACP DISCUSS/DSCN MKR DOCD: CPT | Performed by: PHYSICIAN ASSISTANT

## 2024-03-19 PROCEDURE — 1159F MED LIST DOCD IN RCRD: CPT | Performed by: PHYSICIAN ASSISTANT

## 2024-03-19 PROCEDURE — 99213 OFFICE O/P EST LOW 20 MIN: CPT | Performed by: PHYSICIAN ASSISTANT

## 2024-03-19 NOTE — PROGRESS NOTES
"HPI:  Angel Jordan is a 68 y.o. male who presents today for postop visit after undergoing lumbar fusion on 11/02/23 with Dr. Farrell.      Patient feels he has finally \"turned a corner\" in the last month and is feeling better. Back and RLE pain much improved compared to preop symptoms. Main complaint at this time is persistent numbness in the RLE from the knee to the lateral calf. Numbness worsens with standing & ambulation.     He tells me he is now up to walking 3.5 miles a day. He continues with PT and has been doing the exercises as instructed. He is looking forward to getting back to golfing soon.    ROS:  Reviewed on EMR and patient intake sheet.    PMH/SH:  Reviewed on EMR and patient intake sheet.    Exam:  Well appearing, NAD  Pleasant & cooperative  BMI 29.91  Decreased ROM lumbar spine with rotation, flexion/extension  no paraspinal muscle spasms  Decreased sensation RLE  lower extremity motor 5/5 throughout  normal gait & station    Radiology:     X-rays lumbar 11/21/23: L5/S1 anterior fusion with interbody cage seated appropriately. No fractures or dislocations. Instrumentation intact and no signs of hardware malfunction.     Assessment and Plan:  Lumbar radiculopathy  Post lumbar fusion    Both the patient and I are pleased with how his recovery has progressed. He should continue with the physical therapy and can increase activity as tolerated. No further restrictions at this time.  Recommend he continue ambulating as tolerated and work on core strengthening.   Discussed the numbness can persist for up to a year after surgery, abnd may not return completely to normal sensation.     Plan to see him back 1 year postop with repeat Xrays. Of course if needed he is welcome to call at anytime with questions/concerns.    Loly Weiss PA-C  Department of Orthopaedic Surgery    [address] [place] [address]    Voicemail: [phone]   Appts: [phone]  Fax: [phone]      "

## 2024-03-22 ENCOUNTER — TREATMENT (OUTPATIENT)
Dept: PHYSICAL THERAPY | Facility: CLINIC | Age: 69
End: 2024-03-22
Payer: COMMERCIAL

## 2024-03-22 DIAGNOSIS — M54.16 RIGHT LUMBAR RADICULOPATHY: ICD-10-CM

## 2024-03-22 DIAGNOSIS — Z98.1 S/P LUMBAR FUSION: ICD-10-CM

## 2024-03-22 PROCEDURE — 97014 ELECTRIC STIMULATION THERAPY: CPT | Mod: GP

## 2024-03-22 PROCEDURE — 97110 THERAPEUTIC EXERCISES: CPT | Mod: GP

## 2024-03-22 NOTE — PROGRESS NOTES
"Physical Therapy Treatment Note    Patient Name: Angel Jordan  MRN Number: [id number]  Initial Examination Date: 12/22/23  Referring Provider: Loly Farrell  Evaluating Clinician: ABIGAIL Dye PT  ONSET DATE: 11/2/23 DOS    Today's Date: 3/22/2024  Time Calculation  Start Time: 0755  Stop Time: 0845  Time Calculation (min): 50 min    INSURANCE:  Visit Number: 13  Approved Visits: 40   Insurance Info: EGP - No auth. 40 visits (PT/OT comb.) 100% coverage after deductible. Deduct $1500   Insurance Auth Dates: NA  CMS Certification Period: NA    PRECAUTIONS AND ANY SPECIAL CONCERNS: Okay to lift up to 25 pounds until 8 weeks postop, then may increase lifting as tolerated. Continue to limit excessive bending/lifting/twisting at the waist, however okay to do gentle ROM exercises to help reduce stiffness & increase mobility. No strenuous activity such as running/jumping/heavy lifting/activities that strain low back until 8 weeks postop. Encourage ambulating as tolerated.      Surgical history: L/R TKR, Left TSR, CTR on right, hernia repair,     PT CLINICAL PROBLEM: Lumbar Radiculopathy  M54.16 Status Post L5-S1 Anterior Lumbar Fusion 11/2/23 DOS Z98.1    PROBLEM LIST: Chief Dx code:   1. Right lumbar radiculopathy  Follow Up In Physical Therapy      2. S/P lumbar fusion  Follow Up In Physical Therapy              SUBJECTIVE: States before surgery had RLE numbness dhara. When standing and this was the most concerning issue. Now is having increased LBP post operatively and the location he was feeling numbness in the RLE is now painful. He is 7 weeks and 1 day post op. 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 He is a golfer and also rides trike motorcycles. His goals are to \"get rid of the pain so I can go back to normal\" He has had very good results with past ortho surgeries but this one has been the " "most difficult. He gets very painful by 3pm in the afternoon. He gets some relief with sitting. His symptoms at worst are 7/10 and on average 3/10. His symptoms are worst in the right lateral thigh down the leg to the knee and shin. He has no symptoms at night to sleep. He does feel better to lay down.     3/22/24: Saw ortho and to have next appt at 1 year follow up \"takes time for the nerves to heal... and no time schedule\" Does better in the am often in the late afternoon the leg starts to \"tighten and get numb and stiff\" and he needs to sit down for relief or recline. Most of the early evening he is limited with leg symptoms. Taking gabapentin 600 mg 2/day. \"It is getting better\" \"Twice this week I got 7000 steps\"     TREATMENT:  Symptoms/NPRS before Rx: 5  Symptoms/NPRS after Rx: 3  Timed Codes: (# minutes per modality and 0 if omitted)  TE:    NMRE-ED:     Gait:      Manual:      Stim:     Seated YTB DF 3 x 10  BTB PF 3 x 10  Long sitting strap calf stretch 2 x 30 sec  Sit to stand squats 10 reps  TDM walk 3 min 1.4 MPH  Bike 5 min seat 11 R3  PNE Edu \"pain is more than just tissue damage\"   IFS right buttock/lumbar 15 min  Heat  PNE in detail this date mechanics, sensitization, neuronal pool, expectations...      Joint Range of Motion ROM “Mobility”  Range of motion is purposeful movement to the joints with the intention of loosening the joint up and creating more motion. It is often used for stiff and tight joints and sometimes even when a joint area is symptomatic. ROM can be started with light stretches for 5-10 seconds about 10-20 reps and under some circumstances stronger stretches will need to be held for 30 seconds and repeated 3-5 times. It depends on how symptomatic the area is, tolerance, and how tight the joint is. Generally, ROM is done 1-3 times per day based on need and results.    Muscle Flexibility/Stretching  Stretches for muscle flexibility are usually held for 30 seconds and repeated 1-3 " times based on the need. If the muscle is very tight more stretches are indicated if symptoms and tolerance allow. Generally, muscle stretching is done 3-5 times per week. If the muscle is very tight and not painful 5/week is better until it becomes looser. If the muscle is just moderately tight 3/week will do. Stretching is not usually needed if the muscle is not tight.     Strengthening and “Stability” Training  Exercises that are given for the purpose of strengthening usually should be done every other day hence 3 times per week. Sometimes, in the earlier stages of recovery when the intensity of the strengthening activity is low, the exercises can be done more often up to 5 days per week but you should have some days for rest and recovery. Generally, the harder the exertion on the muscle (intensity) and the more sore the muscle is after training the more time you need to rest between strengthening sessions.     Endurance/Cardiorespiratory Training  These would be activities that get your heart rate and breathing rate up but also could include lengthening the amount of time you stand or walk, or performing home tasks for longer amounts of time, or walking, stationary biking, swimming, etc. Usually 3-5 times per week.    Postural, Walking, and Movement Re-Training Activities: (Habits)  Postural re-training activities are better done many times per day to instill better habits and to take the stress and strain off the joints and muscles. If your aim is to have better posture or bodily alignment you may need to change positions often, do light stretches periodically, and other activities we will show you.     Soreness Rules for Exercise and Normal Daily Activities:  No pain > 5/10 with activity, stretches, or strengthening  No lasting symptoms: usually symptoms should ease pretty quickly after exercise   No sharp pain with exercise or activity     Access Code: [email or web address]  URL: [email or web address]  Date:  "12/28/2023  Prepared by: Olegario Dye    Exercises  - Hook Lying Buttock Stretch with One Leg Crossed Over the other  - 1-2 x daily - 3-5 reps - 15 hold  - Hooklying Single Knee to Chest Stretch  - 1-2 x daily - 3-5 reps - 15 hold  - Heel Raises with Counter Support  - 3-5 x weekly - 1-3 sets - 10 reps  - Calf Stretch Against Wall (Gastroc)  - 1-2 x daily - 3 reps - 30 hold  - Standing Calf Stretch on 2 by 4 Board  - 1-2 x daily - 3 reps - 30 hold  - Seated Hamstring Curl with Anchored Resistance  - 3-5 x weekly - 1-3 sets - 10 reps  - Seated Hip Abd/ER \"Band Outs\" Outer Hip Exercise  - 3-5 x weekly  - Seated Long Arc Quad with Ankle Weight  - 3-5 x weekly - 1-3 sets - 10 reps - 2 hold  - Sit to Stand Squat Movement  - 3 x weekly - 1-2 sets - 10 reps    OBJECTIVE:  OUTCOME MEASURE: KENNY 40%   Supple full hip rom   SLR 70 clear NE  Hip scour -  PKB quad -   Noted atrophy right quad and glute max  Quad 4-  Glute max 4-  TA 4-  Lumbar mobility 25% not tested any further but no peripheralization  Tender greater troch mild  Right glute med 3+  SLS right has mod left pelvic drop and difficult to hold with whole leg shaking noted  Gait decreased control and stability left knee  Provided hand out of alarm system excitation levels.     2/23/24:  KE 4+  Ham 4+  Ev 4+  DF 4- RLE      ASSESSMENT: Open to pain neuroscience discussion.     PLAN: NV manual to lumbar and continue with PNE approach nerve glides positive support graded movement \"nudging\" along activity tolerance and function.     Patient/parent/caregiver agreed and consented to plan of care for skilled physical therapy at 1 times per week for 8-10 weeks. Physical Therapy to include modalities prn as indicated, therapeutic exercise, manual therapy, neuromuscular re-education, gait and functional performance training, instruction and practice in a home exercise program (HEP) and self-management skills.        CARE PLAN/GOALS: (met, progressing, not progressing)  STG's: " (weeks  4 / visits  4 )  Symptoms < 5 at 3 pm in afternoon  2. Right LE quad and TA strength increase to 4+/5 so pt. Able to stand on right leg > 10 count with good stability and pelvifemoral control  LTG's: (weeks  10 /visits  10 )  3. Able to walk steps reciprocally with good strength and knee control  4. Minimal RLE/thight to knee symptoms in pm  5. The Global Rating of Change Score (GROC) will improve to 5/7 =  “a good deal better” or more.   6. Improve functional outcome tool score (FOTS: KENNY, NDI, ASES, ABC, etc.) by > 10 points for Minimally Important Clinical Difference (MICD).  7. Patient/client will be independent with a HEP and self-management skills to further enhance recovery and progress.  8. Patient/client will verbalize >75% symptom reduction for frequency and severity of symptoms and/or > two point reduction of VAS/NPRS.  9. The Patient Specific Functional Scale metric (PSFS) will improve from baseline value to greater than 80% functional.

## 2024-03-29 ENCOUNTER — TREATMENT (OUTPATIENT)
Dept: PHYSICAL THERAPY | Facility: CLINIC | Age: 69
End: 2024-03-29
Payer: COMMERCIAL

## 2024-03-29 DIAGNOSIS — M54.16 RIGHT LUMBAR RADICULOPATHY: Primary | ICD-10-CM

## 2024-03-29 DIAGNOSIS — Z98.1 S/P LUMBAR FUSION: ICD-10-CM

## 2024-03-29 PROCEDURE — 97110 THERAPEUTIC EXERCISES: CPT | Mod: GP

## 2024-03-29 NOTE — PROGRESS NOTES
"Physical Therapy Treatment Note    Patient Name: Angel Jordan  MRN Number: [id number]  Initial Examination Date: 12/22/23  Referring Provider: Loly Farrell  Evaluating Clinician: ABIGAIL Dye PT  ONSET DATE: 11/2/23 DOS    Today's Date: 3/29/2024  Time Calculation  Start Time: 0800  Stop Time: 0845  Time Calculation (min): 45 min    INSURANCE:  Visit Number: 14  Approved Visits: 40   Insurance Info: EGP - No auth. 40 visits (PT/OT comb.) 100% coverage after deductible. Deduct $1500     PRECAUTIONS AND ANY SPECIAL CONCERNS: Okay to lift up to 25 pounds until 8 weeks postop, then may increase lifting as tolerated. Continue to limit excessive bending/lifting/twisting at the waist, however okay to do gentle ROM exercises to help reduce stiffness & increase mobility. No strenuous activity such as running/jumping/heavy lifting/activities that strain low back until 8 weeks postop. Encourage ambulating as tolerated.      Surgical history: L/R TKR, Left TSR, CTR on right, hernia repair,     PT CLINICAL PROBLEM: Lumbar Radiculopathy  M54.16 Status Post L5-S1 Anterior Lumbar Fusion 11/2/23 DOS Z98.1    PROBLEM LIST: Chief Dx code:   1. Right lumbar radiculopathy  Follow Up In Physical Therapy      2. S/P lumbar fusion  Follow Up In Physical Therapy            SUBJECTIVE: States before surgery had RLE numbness dhara. When standing and this was the most concerning issue. Now is having increased LBP post operatively and the location he was feeling numbness in the RLE is now painful. He is 7 weeks and 1 day post op. 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 He is a golfer and also rides trike motorcycles. His goals are to \"get rid of the pain so I can go back to normal\" He has had very good results with past ortho surgeries but this one has been the most difficult. He gets very painful by 3pm in the " "afternoon. He gets some relief with sitting. His symptoms at worst are 7/10 and on average 3/10. His symptoms are worst in the right lateral thigh down the leg to the knee and shin. He has no symptoms at night to sleep. He does feel better to lay down.     3/22/24: Saw ortho and to have next appt at 1 year follow up \"takes time for the nerves to heal... and no time schedule\" Does better in the am often in the late afternoon the leg starts to \"tighten and get numb and stiff\" and he needs to sit down for relief or recline. Most of the early evening he is limited with leg symptoms. Taking gabapentin 600 mg 2/day. \"It is getting better\" \"Twice this week I got 7000 steps\"     3/29/24: His low back seems to feel better on the weekends. Gets numbness in the leg by the end of the day. Pain is less 3-4/10 \"at it's worse point\" Gets most irritated by standing still in one place.     TREATMENT:  Symptoms/NPRS before Rx: 5  Symptoms/NPRS after Rx: 3  Timed Codes: (# minutes per modality and 0 if omitted)  TE:    NMRE-ED:     Gait:      Manual:      Stim:     Bike 7 min R3   Quadruped arch and round (new)  Seated flexion light stretch 5 x (new)  Seated ham stretch 3 times each  SKTC 5 x each focusing on positive body sensations and experience   Buttock stretch  Mid range LTR focus on comfort and positive sensations  Slow breathing 5 ct in and 5 ct out 1 min      Joint Range of Motion ROM “Mobility”  Range of motion is purposeful movement to the joints with the intention of loosening the joint up and creating more motion. It is often used for stiff and tight joints and sometimes even when a joint area is symptomatic. ROM can be started with light stretches for 5-10 seconds about 10-20 reps and under some circumstances stronger stretches will need to be held for 30 seconds and repeated 3-5 times. It depends on how symptomatic the area is, tolerance, and how tight the joint is. Generally, ROM is done 1-3 times per day based on need " and results.    Muscle Flexibility/Stretching  Stretches for muscle flexibility are usually held for 30 seconds and repeated 1-3 times based on the need. If the muscle is very tight more stretches are indicated if symptoms and tolerance allow. Generally, muscle stretching is done 3-5 times per week. If the muscle is very tight and not painful 5/week is better until it becomes looser. If the muscle is just moderately tight 3/week will do. Stretching is not usually needed if the muscle is not tight.     Strengthening and “Stability” Training  Exercises that are given for the purpose of strengthening usually should be done every other day hence 3 times per week. Sometimes, in the earlier stages of recovery when the intensity of the strengthening activity is low, the exercises can be done more often up to 5 days per week but you should have some days for rest and recovery. Generally, the harder the exertion on the muscle (intensity) and the more sore the muscle is after training the more time you need to rest between strengthening sessions.     Endurance/Cardiorespiratory Training  These would be activities that get your heart rate and breathing rate up but also could include lengthening the amount of time you stand or walk, or performing home tasks for longer amounts of time, or walking, stationary biking, swimming, etc. Usually 3-5 times per week.    Postural, Walking, and Movement Re-Training Activities: (Habits)  Postural re-training activities are better done many times per day to instill better habits and to take the stress and strain off the joints and muscles. If your aim is to have better posture or bodily alignment you may need to change positions often, do light stretches periodically, and other activities we will show you.     Soreness Rules for Exercise and Normal Daily Activities:  No pain > 5/10 with activity, stretches, or strengthening  No lasting symptoms: usually symptoms should ease pretty quickly  "after exercise   No sharp pain with exercise or activity     Access Code: [email or web address]  URL: [email or web address]  Date: 12/28/2023  Prepared by: Olegario Dye    Exercises  - Hook Lying Buttock Stretch with One Leg Crossed Over the other  - 1-2 x daily - 3-5 reps - 15 hold  - Hooklying Single Knee to Chest Stretch  - 1-2 x daily - 3-5 reps - 15 hold  - Heel Raises with Counter Support  - 3-5 x weekly - 1-3 sets - 10 reps  - Calf Stretch Against Wall (Gastroc)  - 1-2 x daily - 3 reps - 30 hold  - Standing Calf Stretch on 2 by 4 Board  - 1-2 x daily - 3 reps - 30 hold  - Seated Hamstring Curl with Anchored Resistance  - 3-5 x weekly - 1-3 sets - 10 reps  - Seated Hip Abd/ER \"Band Outs\" Outer Hip Exercise  - 3-5 x weekly  - Seated Long Arc Quad with Ankle Weight  - 3-5 x weekly - 1-3 sets - 10 reps - 2 hold  - Sit to Stand Squat Movement  - 3 x weekly - 1-2 sets - 10 reps    OBJECTIVE:  OUTCOME MEASURE: KENNY 40%   Supple full hip rom   SLR 70 clear NE  Hip scour -  PKB quad -   Noted atrophy right quad and glute max  Quad 4-  Glute max 4-  TA 4-  Lumbar mobility 25% not tested any further but no peripheralization  Tender greater troch mild  Right glute med 3+  SLS right has mod left pelvic drop and difficult to hold with whole leg shaking noted  Gait decreased control and stability left knee  Provided hand out of alarm system excitation levels.     2/23/24:  KE 4+  Ham 4+  Ev 4+  DF 4- RLE      ASSESSMENT: Open to pain neuroscience discussion. Felt relaxed after slow breathing reports less mental and physical tension after. Add breathing exercises.      PLAN: NV manual to lumbar and continue with PNE approach nerve glides positive support graded movement \"nudging\" along activity tolerance and function.     Patient/parent/caregiver agreed and consented to plan of care for skilled physical therapy at 1 times per week for 8-10 weeks. Physical Therapy to include modalities prn as indicated, therapeutic exercise, manual " therapy, neuromuscular re-education, gait and functional performance training, instruction and practice in a home exercise program (HEP) and self-management skills.        CARE PLAN/GOALS: (met, progressing, not progressing)  STG's: (weeks  4 / visits  4 )  Symptoms < 5 at 3 pm in afternoon  2. Right LE quad and TA strength increase to 4+/5 so pt. Able to stand on right leg > 10 count with good stability and pelvifemoral control  LTG's: (weeks  10 /visits  10 )  3. Able to walk steps reciprocally with good strength and knee control  4. Minimal RLE/thight to knee symptoms in pm  5. The Global Rating of Change Score (GROC) will improve to 5/7 =  “a good deal better” or more.   6. Improve functional outcome tool score (FOTS: KENNY, NDI, ASES, ABC, etc.) by > 10 points for Minimally Important Clinical Difference (MICD).  7. Patient/client will be independent with a HEP and self-management skills to further enhance recovery and progress.  8. Patient/client will verbalize >75% symptom reduction for frequency and severity of symptoms and/or > two point reduction of VAS/NPRS.  9. The Patient Specific Functional Scale metric (PSFS) will improve from baseline value to greater than 80% functional.

## 2024-04-02 DIAGNOSIS — I10 BENIGN HYPERTENSION: ICD-10-CM

## 2024-04-02 RX ORDER — METOPROLOL SUCCINATE 25 MG/1
25 TABLET, EXTENDED RELEASE ORAL DAILY
Qty: 90 TABLET | Refills: 1 | Status: SHIPPED | OUTPATIENT
Start: 2024-04-02

## 2024-04-11 ENCOUNTER — TELEPHONE (OUTPATIENT)
Dept: PRIMARY CARE | Facility: CLINIC | Age: 69
End: 2024-04-11
Payer: COMMERCIAL

## 2024-04-11 DIAGNOSIS — U07.1 COVID-19: Primary | ICD-10-CM

## 2024-04-11 RX ORDER — DEXAMETHASONE 4 MG/1
4 TABLET ORAL
Qty: 5 TABLET | Refills: 0 | Status: SHIPPED | OUTPATIENT
Start: 2024-04-11 | End: 2024-04-16

## 2024-04-11 RX ORDER — GUAIFENESIN 600 MG/1
600 TABLET, EXTENDED RELEASE ORAL 2 TIMES DAILY
Qty: 10 TABLET | Refills: 0 | Status: SHIPPED | OUTPATIENT
Start: 2024-04-11 | End: 2024-04-16

## 2024-04-11 RX ORDER — AZITHROMYCIN 500 MG/1
500 TABLET, FILM COATED ORAL DAILY
Qty: 5 TABLET | Refills: 0 | Status: SHIPPED | OUTPATIENT
Start: 2024-04-11 | End: 2024-04-16

## 2024-04-11 NOTE — TELEPHONE ENCOUNTER
Covid positive 4/7  Cough, sinus drainage, green phlegm   Facial and jaw pain and pressure   Fever comes and goes     Allergy? Codeine, meperidine

## 2024-04-12 ENCOUNTER — APPOINTMENT (OUTPATIENT)
Dept: PHYSICAL THERAPY | Facility: CLINIC | Age: 69
End: 2024-04-12
Payer: COMMERCIAL

## 2024-04-13 DIAGNOSIS — E78.00 HYPERCHOLESTEROLEMIA: ICD-10-CM

## 2024-04-15 RX ORDER — ATORVASTATIN CALCIUM 20 MG/1
20 TABLET, FILM COATED ORAL NIGHTLY
Qty: 90 TABLET | Refills: 0 | Status: SHIPPED | OUTPATIENT
Start: 2024-04-15

## 2024-04-23 NOTE — CARE PLAN
Problem: Fall/Injury  Goal: Not fall by end of shift  Outcome: Progressing  Goal: Be free from injury by end of the shift  Outcome: Progressing  Goal: Verbalize understanding of personal risk factors for fall in the hospital  Outcome: Progressing  Goal: Verbalize understanding of risk factor reduction measures to prevent injury from fall in the home  Outcome: Progressing  Goal: Use assistive devices by end of the shift  Outcome: Progressing  Goal: Pace activities to prevent fatigue by end of the shift  Outcome: Progressing      Skin, right medial thigh, shave biopsy:   -Fibroepithelial polyp (acrochordon).    ---- Message from Dylan Calderon MD sent at 4/23/2024  2:25 PM CDT -----  Please contact patient regarding benign result. Thank you.  ----- Message -----  From: Lab, Background User  Sent: 4/23/2024   2:23 PM CDT  To: LEONIDAS Hardin    Writer called patient to inform him of his benign biopsy results.  Patient verbalized understanding.

## 2024-04-26 ENCOUNTER — TREATMENT (OUTPATIENT)
Dept: PHYSICAL THERAPY | Facility: CLINIC | Age: 69
End: 2024-04-26
Payer: COMMERCIAL

## 2024-04-26 DIAGNOSIS — Z98.1 S/P LUMBAR FUSION: ICD-10-CM

## 2024-04-26 DIAGNOSIS — M54.16 RIGHT LUMBAR RADICULOPATHY: Primary | ICD-10-CM

## 2024-04-26 PROCEDURE — 97110 THERAPEUTIC EXERCISES: CPT | Mod: GP

## 2024-04-26 PROCEDURE — 97014 ELECTRIC STIMULATION THERAPY: CPT | Mod: GP

## 2024-04-26 NOTE — PROGRESS NOTES
"Physical Therapy Treatment Note    Patient Name: Angel Jordan  MRN Number: [id number]  Initial Examination Date: 12/22/23  Referring Provider: Loly Farrell  Evaluating Clinician: ABIGAIL Dye PT  ONSET DATE: 11/2/23 DOS    Today's Date: 4/26/2024  Time Calculation  Start Time: 0800  Stop Time: 0845  Time Calculation (min): 45 min    INSURANCE:  Visit Number: 15  Approved Visits: 40   Insurance Info: EGP - No auth. 40 visits (PT/OT comb.) 100% coverage after deductible. Deduct $1500     PRECAUTIONS AND ANY SPECIAL CONCERNS: Okay to lift up to 25 pounds until 8 weeks postop, then may increase lifting as tolerated. Continue to limit excessive bending/lifting/twisting at the waist, however okay to do gentle ROM exercises to help reduce stiffness & increase mobility. No strenuous activity such as running/jumping/heavy lifting/activities that strain low back until 8 weeks postop. Encourage ambulating as tolerated.      Surgical history: L/R TKR, Left TSR, CTR on right, hernia repair,     PT CLINICAL PROBLEM: Lumbar Radiculopathy  M54.16 Status Post L5-S1 Anterior Lumbar Fusion 11/2/23 DOS Z98.1    PROBLEM LIST: Chief Dx code:   1. Right lumbar radiculopathy        2. S/P lumbar fusion              SUBJECTIVE: States before surgery had RLE numbness dhara. When standing and this was the most concerning issue. Now is having increased LBP post operatively and the location he was feeling numbness in the RLE is now painful. He is 7 weeks and 1 day post op. 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 He is a golfer and also rides trike motorcycles. His goals are to \"get rid of the pain so I can go back to normal\" He has had very good results with past ortho surgeries but this one has been the most difficult. He gets very painful by 3pm in the afternoon. He gets some relief with sitting. His symptoms at worst " "are 7/10 and on average 3/10. His symptoms are worst in the right lateral thigh down the leg to the knee and shin. He has no symptoms at night to sleep. He does feel better to lay down.     3/22/24: Saw ortho and to have next appt at 1 year follow up \"takes time for the nerves to heal... and no time schedule\" Does better in the am often in the late afternoon the leg starts to \"tighten and get numb and stiff\" and he needs to sit down for relief or recline. Most of the early evening he is limited with leg symptoms. Taking gabapentin 600 mg 2/day. \"It is getting better\" \"Twice this week I got 7000 steps\"     3/29/24: His low back seems to feel better on the weekends. Gets numbness in the leg by the end of the day. Pain is less 3-4/10 \"at it's worse point\" Gets most irritated by standing still in one place.     4/26/24 was away for work and then traveled to visit family and then got covid. Now better and ready to resume PT. All in all states he continues to improve \"a little bit\" \"I am getting way better at standing\" Having neck pain this am.     TREATMENT:  Symptoms/NPRS before Rx: 5  Symptoms/NPRS after Rx: 3  Timed Codes: (# minutes per modality and 0 if omitted)  TE:  30  NMRE-ED:     Gait:      Manual:      Stim: 10    Bike 10 min R3   Seated ball rolls lumbar rom  Seated ham stretch  Slantboard calf stretch  Heel raises 2 x 10  Quadruped arch and round   Seated flexion light stretch 5 x   Seated ham stretch 3 times each  SKTC 5 x each focusing on positive body sensations and experience   Buttock stretch  Prone glute squeezes 10x  Mid range LTR focus on comfort and positive sensations  Slow breathing 5 ct in and 5 ct out 1 min  Seated IFS lumbar with moist heat x 10      Joint Range of Motion ROM “Mobility”  Range of motion is purposeful movement to the joints with the intention of loosening the joint up and creating more motion. It is often used for stiff and tight joints and sometimes even when a joint area is " symptomatic. ROM can be started with light stretches for 5-10 seconds about 10-20 reps and under some circumstances stronger stretches will need to be held for 30 seconds and repeated 3-5 times. It depends on how symptomatic the area is, tolerance, and how tight the joint is. Generally, ROM is done 1-3 times per day based on need and results.    Muscle Flexibility/Stretching  Stretches for muscle flexibility are usually held for 30 seconds and repeated 1-3 times based on the need. If the muscle is very tight more stretches are indicated if symptoms and tolerance allow. Generally, muscle stretching is done 3-5 times per week. If the muscle is very tight and not painful 5/week is better until it becomes looser. If the muscle is just moderately tight 3/week will do. Stretching is not usually needed if the muscle is not tight.     Strengthening and “Stability” Training  Exercises that are given for the purpose of strengthening usually should be done every other day hence 3 times per week. Sometimes, in the earlier stages of recovery when the intensity of the strengthening activity is low, the exercises can be done more often up to 5 days per week but you should have some days for rest and recovery. Generally, the harder the exertion on the muscle (intensity) and the more sore the muscle is after training the more time you need to rest between strengthening sessions.     Endurance/Cardiorespiratory Training  These would be activities that get your heart rate and breathing rate up but also could include lengthening the amount of time you stand or walk, or performing home tasks for longer amounts of time, or walking, stationary biking, swimming, etc. Usually 3-5 times per week.    Postural, Walking, and Movement Re-Training Activities: (Habits)  Postural re-training activities are better done many times per day to instill better habits and to take the stress and strain off the joints and muscles. If your aim is to have  "better posture or bodily alignment you may need to change positions often, do light stretches periodically, and other activities we will show you.     Soreness Rules for Exercise and Normal Daily Activities:  No pain > 5/10 with activity, stretches, or strengthening  No lasting symptoms: usually symptoms should ease pretty quickly after exercise   No sharp pain with exercise or activity     Access Code: [email or web address]  URL: [email or web address]  Date: 12/28/2023  Prepared by: Olegario Dye    Exercises  - Hook Lying Buttock Stretch with One Leg Crossed Over the other  - 1-2 x daily - 3-5 reps - 15 hold  - Hooklying Single Knee to Chest Stretch  - 1-2 x daily - 3-5 reps - 15 hold  - Heel Raises with Counter Support  - 3-5 x weekly - 1-3 sets - 10 reps  - Calf Stretch Against Wall (Gastroc)  - 1-2 x daily - 3 reps - 30 hold  - Standing Calf Stretch on 2 by 4 Board  - 1-2 x daily - 3 reps - 30 hold  - Seated Hamstring Curl with Anchored Resistance  - 3-5 x weekly - 1-3 sets - 10 reps  - Seated Hip Abd/ER \"Band Outs\" Outer Hip Exercise  - 3-5 x weekly  - Seated Long Arc Quad with Ankle Weight  - 3-5 x weekly - 1-3 sets - 10 reps - 2 hold  - Sit to Stand Squat Movement  - 3 x weekly - 1-2 sets - 10 reps    OBJECTIVE:  OUTCOME MEASURE: KENNY 40%   Supple full hip rom   SLR 70 clear NE  Hip scour -  PKB quad -   Noted atrophy right quad and glute max  Quad 4-  Glute max 4-  TA 4-  Lumbar mobility 25% not tested any further but no peripheralization  Tender greater troch mild  Right glute med 3+  SLS right has mod left pelvic drop and difficult to hold with whole leg shaking noted  Gait decreased control and stability left knee  Provided hand out of alarm system excitation levels.     2/23/24:  KE 4+  Ham 4+  Ev 4+  DF 4- RLE      ASSESSMENT:Pt. States \"I did very well with the exercises\"      PLAN: NV manual to lumbar and continue with PNE approach nerve glides positive support graded movement \"nudging\" along activity " tolerance and function.     Patient/parent/caregiver agreed and consented to plan of care for skilled physical therapy at 1 times per week for 8-10 weeks. Physical Therapy to include modalities prn as indicated, therapeutic exercise, manual therapy, neuromuscular re-education, gait and functional performance training, instruction and practice in a home exercise program (HEP) and self-management skills.        CARE PLAN/GOALS: (met, progressing, not progressing)  STG's: (weeks  4 / visits  4 )  Symptoms < 5 at 3 pm in afternoon  2. Right LE quad and TA strength increase to 4+/5 so pt. Able to stand on right leg > 10 count with good stability and pelvifemoral control  LTG's: (weeks  10 /visits  10 )  3. Able to walk steps reciprocally with good strength and knee control  4. Minimal RLE/thight to knee symptoms in pm  5. The Global Rating of Change Score (GROC) will improve to 5/7 =  “a good deal better” or more.   6. Improve functional outcome tool score (FOTS: KENNY, NDI, ASES, ABC, etc.) by > 10 points for Minimally Important Clinical Difference (MICD).  7. Patient/client will be independent with a HEP and self-management skills to further enhance recovery and progress.  8. Patient/client will verbalize >75% symptom reduction for frequency and severity of symptoms and/or > two point reduction of VAS/NPRS.  9. The Patient Specific Functional Scale metric (PSFS) will improve from baseline value to greater than 80% functional.

## 2024-05-01 DIAGNOSIS — M54.16 LUMBAR RADICULOPATHY: ICD-10-CM

## 2024-05-01 DIAGNOSIS — M47.816 LUMBAR SPONDYLOSIS: ICD-10-CM

## 2024-05-01 RX ORDER — DULOXETIN HYDROCHLORIDE 30 MG/1
30 CAPSULE, DELAYED RELEASE ORAL DAILY
Qty: 30 CAPSULE | Refills: 2 | Status: SHIPPED | OUTPATIENT
Start: 2024-05-01 | End: 2024-07-30

## 2024-06-03 DIAGNOSIS — I10 ESSENTIAL (PRIMARY) HYPERTENSION: ICD-10-CM

## 2024-06-03 RX ORDER — LISINOPRIL 10 MG/1
10 TABLET ORAL DAILY
Qty: 90 TABLET | Refills: 0 | Status: SHIPPED | OUTPATIENT
Start: 2024-06-03

## 2024-06-04 DIAGNOSIS — M54.16 LUMBAR RADICULOPATHY: ICD-10-CM

## 2024-06-04 RX ORDER — GABAPENTIN 300 MG/1
600 CAPSULE ORAL 2 TIMES DAILY
Qty: 180 CAPSULE | Refills: 1 | Status: SHIPPED | OUTPATIENT
Start: 2024-06-04 | End: 2024-09-02

## 2024-06-30 ENCOUNTER — DOCUMENTATION (OUTPATIENT)
Dept: PHYSICAL THERAPY | Facility: CLINIC | Age: 69
End: 2024-06-30
Payer: COMMERCIAL

## 2024-06-30 NOTE — PROGRESS NOTES
Discharge Summary    Name: Angel Jordan  MRN: [id number]  : 1955  Date: 24    Discharge Summary: PT        Rehab Discharge Reason: Other Pt. Most likely has reached max benefit from PT did have a considerable post operative course of therapy and now has not been seen in 2 months so will be Dc'd at this time. If he needs further care we would require a new referral and evaluation at that time.

## 2024-07-15 ENCOUNTER — APPOINTMENT (OUTPATIENT)
Dept: PRIMARY CARE | Facility: CLINIC | Age: 69
End: 2024-07-15
Payer: COMMERCIAL

## 2024-07-15 VITALS
BODY MASS INDEX: 28.94 KG/M2 | HEIGHT: 67 IN | OXYGEN SATURATION: 96 % | RESPIRATION RATE: 18 BRPM | WEIGHT: 184.4 LBS | SYSTOLIC BLOOD PRESSURE: 130 MMHG | DIASTOLIC BLOOD PRESSURE: 86 MMHG | HEART RATE: 65 BPM

## 2024-07-15 DIAGNOSIS — N18.31 STAGE 3A CHRONIC KIDNEY DISEASE (MULTI): ICD-10-CM

## 2024-07-15 DIAGNOSIS — J30.2 SEASONAL ALLERGIC RHINITIS, UNSPECIFIED TRIGGER: ICD-10-CM

## 2024-07-15 DIAGNOSIS — Z98.1 S/P LUMBAR FUSION: ICD-10-CM

## 2024-07-15 DIAGNOSIS — Z00.00 ANNUAL PHYSICAL EXAM: ICD-10-CM

## 2024-07-15 DIAGNOSIS — Z96.659 HISTORY OF ARTHROPLASTY OF KNEE: ICD-10-CM

## 2024-07-15 DIAGNOSIS — Z00.00 HEALTHCARE MAINTENANCE: ICD-10-CM

## 2024-07-15 DIAGNOSIS — K21.9 GASTROESOPHAGEAL REFLUX DISEASE WITHOUT ESOPHAGITIS: ICD-10-CM

## 2024-07-15 DIAGNOSIS — F41.1 GAD (GENERALIZED ANXIETY DISORDER): ICD-10-CM

## 2024-07-15 DIAGNOSIS — G89.29 CHRONIC LOW BACK PAIN, UNSPECIFIED BACK PAIN LATERALITY, UNSPECIFIED WHETHER SCIATICA PRESENT: ICD-10-CM

## 2024-07-15 DIAGNOSIS — Z12.11 ENCOUNTER FOR SCREENING FOR MALIGNANT NEOPLASM OF COLON: Primary | ICD-10-CM

## 2024-07-15 DIAGNOSIS — E78.00 HYPERCHOLESTEROLEMIA: ICD-10-CM

## 2024-07-15 DIAGNOSIS — M47.816 LUMBAR SPONDYLOSIS: ICD-10-CM

## 2024-07-15 DIAGNOSIS — Z23 ENCOUNTER FOR IMMUNIZATION: ICD-10-CM

## 2024-07-15 DIAGNOSIS — M54.50 CHRONIC LOW BACK PAIN, UNSPECIFIED BACK PAIN LATERALITY, UNSPECIFIED WHETHER SCIATICA PRESENT: ICD-10-CM

## 2024-07-15 DIAGNOSIS — I10 ESSENTIAL (PRIMARY) HYPERTENSION: ICD-10-CM

## 2024-07-15 DIAGNOSIS — M54.16 LUMBAR RADICULOPATHY: ICD-10-CM

## 2024-07-15 PROCEDURE — 1036F TOBACCO NON-USER: CPT

## 2024-07-15 PROCEDURE — 1160F RVW MEDS BY RX/DR IN RCRD: CPT

## 2024-07-15 PROCEDURE — 90677 PCV20 VACCINE IM: CPT

## 2024-07-15 PROCEDURE — 1159F MED LIST DOCD IN RCRD: CPT

## 2024-07-15 PROCEDURE — 90471 IMMUNIZATION ADMIN: CPT

## 2024-07-15 PROCEDURE — 1125F AMNT PAIN NOTED PAIN PRSNT: CPT

## 2024-07-15 PROCEDURE — 99397 PER PM REEVAL EST PAT 65+ YR: CPT

## 2024-07-15 PROCEDURE — 3075F SYST BP GE 130 - 139MM HG: CPT

## 2024-07-15 PROCEDURE — 1157F ADVNC CARE PLAN IN RCRD: CPT

## 2024-07-15 PROCEDURE — 1123F ACP DISCUSS/DSCN MKR DOCD: CPT

## 2024-07-15 PROCEDURE — 3079F DIAST BP 80-89 MM HG: CPT

## 2024-07-15 RX ORDER — LISINOPRIL 10 MG/1
10 TABLET ORAL DAILY
Qty: 90 TABLET | Refills: 1 | Status: SHIPPED | OUTPATIENT
Start: 2024-07-15

## 2024-07-15 RX ORDER — DULOXETIN HYDROCHLORIDE 30 MG/1
30 CAPSULE, DELAYED RELEASE ORAL DAILY
Qty: 90 CAPSULE | Refills: 1 | Status: SHIPPED | OUTPATIENT
Start: 2024-07-15 | End: 2025-01-11

## 2024-07-15 RX ORDER — METOPROLOL SUCCINATE 25 MG/1
25 TABLET, EXTENDED RELEASE ORAL DAILY
Qty: 90 TABLET | Refills: 1 | Status: SHIPPED | OUTPATIENT
Start: 2024-07-15

## 2024-07-15 RX ORDER — ATORVASTATIN CALCIUM 20 MG/1
20 TABLET, FILM COATED ORAL NIGHTLY
Qty: 90 TABLET | Refills: 1 | Status: SHIPPED | OUTPATIENT
Start: 2024-07-15 | End: 2025-01-11

## 2024-07-15 ASSESSMENT — ENCOUNTER SYMPTOMS
DIZZINESS: 0
HEADACHES: 0
CONSTIPATION: 0
ABDOMINAL PAIN: 0
CHILLS: 0
DIARRHEA: 0
NAUSEA: 0
SHORTNESS OF BREATH: 0
VOMITING: 0
FATIGUE: 0
FEVER: 0

## 2024-07-15 ASSESSMENT — PAIN SCALES - GENERAL: PAINLEVEL: 3

## 2024-07-15 NOTE — PROGRESS NOTES
Subjective   Patient ID:   Angel Jordan is a 69 y.o. male who presents for Annual Exam.    HPI  68 y.o. male with PMH remarkable for HTN, HLD, CKD3, GERD, allergic rhinitis who presents to the office today for annual physical. No current complaints.     HEALTH MAINTENANCE:   Smoking: Never  PSA (50+): March 2024   Labs: March 2024  Colonoscopy (45-75): 2011 - NL 10 years. DUE. Order placed   Ophthalmology: Lasix - annual check up  Dental: Annually    Pain scale: 3: arthritic   Living will? Yes  POA? Yes: wife Zenia   Are you currently or have you recently been threatened or abused? No  Do you feel unsafe going back to the place you are living? No  Reported health: Good  Dental visits? Yes  Hearing problems? No  Vision problems? No  Healthy diet? No  Exercise? Exercise 1-3x per week  Adequate fluid intake? Yes 3-4 bottles per day    Social History     Tobacco Use    Smoking status: Never    Smokeless tobacco: Never   Substance Use Topics    Alcohol use: Not Currently    Drug use: Never       Immunization History   Administered Date(s) Administered    Influenza, Seasonal, Quadrivalent, Adjuvanted 10/10/2022    Influenza, seasonal, injectable 11/02/2017, 10/05/2018, 09/18/2019, 10/16/2020    Moderna SARS-CoV-2 Vaccination 03/03/2021, 03/31/2021, 11/01/2021, 05/13/2022    Pneumococcal conjugate vaccine, 20-valent (PREVNAR 20) 07/15/2024    Tdap vaccine, age 7 year and older (BOOSTRIX, ADACEL) 11/16/2017    Zoster, live 11/20/2014       Review of Systems   Constitutional:  Negative for chills, fatigue and fever.   Respiratory:  Negative for shortness of breath.    Cardiovascular:  Negative for chest pain.   Gastrointestinal:  Negative for abdominal pain, constipation, diarrhea, nausea and vomiting.   Neurological:  Negative for dizziness and headaches.   All other systems reviewed and are negative.      Vitals:    07/15/24 0827   BP: 130/86   Pulse: 65   Resp: 18   SpO2: 96%       Physical Exam  Vitals reviewed.    Constitutional:       Appearance: Normal appearance.   HENT:      Head: Normocephalic and atraumatic.   Eyes:      Extraocular Movements: Extraocular movements intact.      Conjunctiva/sclera: Conjunctivae normal.   Neck:      Vascular: No carotid bruit.   Cardiovascular:      Rate and Rhythm: Normal rate and regular rhythm.      Pulses: Normal pulses.      Heart sounds: Normal heart sounds.   Pulmonary:      Effort: Pulmonary effort is normal.      Breath sounds: Normal breath sounds. No wheezing, rhonchi or rales.   Musculoskeletal:      Cervical back: Normal range of motion and neck supple.   Neurological:      General: No focal deficit present.      Mental Status: He is alert and oriented to person, place, and time.   Psychiatric:         Mood and Affect: Mood normal.         Behavior: Behavior normal.         Thought Content: Thought content normal.         Judgment: Judgment normal.         Assessment/Plan   Problem List Items Addressed This Visit             ICD-10-CM    Back pain M54.9    Essential (primary) hypertension I10    Relevant Medications    lisinopril 10 mg tablet    metoprolol succinate XL (Toprol-XL) 25 mg 24 hr tablet    GUANACO (generalized anxiety disorder) F41.1    Hypercholesterolemia E78.00    Relevant Medications    atorvastatin (Lipitor) 20 mg tablet    Lumbar radiculopathy M54.16    Relevant Medications    DULoxetine (Cymbalta) 30 mg DR capsule    Stage 3a chronic kidney disease (Multi) N18.31    Allergic rhinitis due to allergen J30.9    History of arthroplasty of knee Z96.659    Gastroesophageal reflux disease without esophagitis K21.9    Lumbar spondylosis M47.816    Relevant Medications    DULoxetine (Cymbalta) 30 mg DR capsule    S/P lumbar fusion Z98.1     Other Visit Diagnoses         Codes    Encounter for screening for malignant neoplasm of colon    -  Primary Z12.11    Relevant Orders    Colonoscopy Screening; Average Risk Patient    Healthcare maintenance     Z00.00    Relevant  Orders    Pneumococcal conjugate vaccine, 20-valent (PREVNAR 20) (Completed)    Annual physical exam     Z00.00    Encounter for immunization     Z23    Relevant Orders    Pneumococcal conjugate vaccine, 20-valent (PREVNAR 20) (Completed)          - Today counts as your annual physical exam.   - Chronic conditions stable. Continue with current medications and doses.  - Follow up in 6 months.

## 2024-07-16 DIAGNOSIS — N18.9 CHRONIC KIDNEY DISEASE, UNSPECIFIED CKD STAGE: Primary | ICD-10-CM

## 2024-07-24 DIAGNOSIS — Z12.11 COLON CANCER SCREENING: ICD-10-CM

## 2024-07-24 RX ORDER — POLYETHYLENE GLYCOL 3350, SODIUM SULFATE ANHYDROUS, SODIUM BICARBONATE, SODIUM CHLORIDE, POTASSIUM CHLORIDE 236; 22.74; 6.74; 5.86; 2.97 G/4L; G/4L; G/4L; G/4L; G/4L
POWDER, FOR SOLUTION ORAL
Qty: 4000 ML | Refills: 0 | Status: SHIPPED | OUTPATIENT
Start: 2024-07-24

## 2024-09-27 ENCOUNTER — LAB (OUTPATIENT)
Dept: LAB | Facility: LAB | Age: 69
End: 2024-09-27
Payer: COMMERCIAL

## 2024-09-27 DIAGNOSIS — N18.9 CHRONIC KIDNEY DISEASE, UNSPECIFIED CKD STAGE: ICD-10-CM

## 2024-09-27 LAB
ALBUMIN SERPL BCP-MCNC: 4.3 G/DL (ref 3.4–5)
ANION GAP SERPL CALC-SCNC: 11 MMOL/L (ref 10–20)
APPEARANCE UR: CLEAR
BILIRUB UR STRIP.AUTO-MCNC: NEGATIVE MG/DL
BUN SERPL-MCNC: 25 MG/DL (ref 6–23)
CALCIUM SERPL-MCNC: 9.6 MG/DL (ref 8.6–10.3)
CHLORIDE SERPL-SCNC: 105 MMOL/L (ref 98–107)
CO2 SERPL-SCNC: 25 MMOL/L (ref 21–32)
COLOR UR: YELLOW
CREAT SERPL-MCNC: 1.42 MG/DL (ref 0.5–1.3)
CREAT UR-MCNC: 125.5 MG/DL (ref 20–370)
EGFRCR SERPLBLD CKD-EPI 2021: 54 ML/MIN/1.73M*2
ERYTHROCYTE [DISTWIDTH] IN BLOOD BY AUTOMATED COUNT: 13.2 % (ref 11.5–14.5)
GLUCOSE SERPL-MCNC: 99 MG/DL (ref 74–99)
GLUCOSE UR STRIP.AUTO-MCNC: NORMAL MG/DL
HCT VFR BLD AUTO: 40.2 % (ref 41–52)
HGB BLD-MCNC: 13.3 G/DL (ref 13.5–17.5)
KETONES UR STRIP.AUTO-MCNC: NEGATIVE MG/DL
LEUKOCYTE ESTERASE UR QL STRIP.AUTO: NEGATIVE
MCH RBC QN AUTO: 29.2 PG (ref 26–34)
MCHC RBC AUTO-ENTMCNC: 33.1 G/DL (ref 32–36)
MCV RBC AUTO: 88 FL (ref 80–100)
MICROALBUMIN UR-MCNC: <7 MG/L
MICROALBUMIN/CREAT UR: NORMAL MG/G{CREAT}
NITRITE UR QL STRIP.AUTO: NEGATIVE
NRBC BLD-RTO: 0 /100 WBCS (ref 0–0)
PH UR STRIP.AUTO: 5 [PH]
PHOSPHATE SERPL-MCNC: 3.4 MG/DL (ref 2.5–4.9)
PLATELET # BLD AUTO: 262 X10*3/UL (ref 150–450)
POTASSIUM SERPL-SCNC: 4.4 MMOL/L (ref 3.5–5.3)
PROT UR STRIP.AUTO-MCNC: NEGATIVE MG/DL
RBC # BLD AUTO: 4.55 X10*6/UL (ref 4.5–5.9)
RBC # UR STRIP.AUTO: NEGATIVE /UL
SODIUM SERPL-SCNC: 137 MMOL/L (ref 136–145)
SP GR UR STRIP.AUTO: 1.02
UROBILINOGEN UR STRIP.AUTO-MCNC: NORMAL MG/DL
WBC # BLD AUTO: 7.9 X10*3/UL (ref 4.4–11.3)

## 2024-09-27 PROCEDURE — 82043 UR ALBUMIN QUANTITATIVE: CPT

## 2024-09-27 PROCEDURE — 36415 COLL VENOUS BLD VENIPUNCTURE: CPT

## 2024-09-27 PROCEDURE — 82570 ASSAY OF URINE CREATININE: CPT

## 2024-10-10 ENCOUNTER — APPOINTMENT (OUTPATIENT)
Dept: NEPHROLOGY | Facility: CLINIC | Age: 69
End: 2024-10-10
Payer: COMMERCIAL

## 2024-10-10 VITALS
DIASTOLIC BLOOD PRESSURE: 86 MMHG | HEART RATE: 65 BPM | HEIGHT: 67 IN | WEIGHT: 182 LBS | SYSTOLIC BLOOD PRESSURE: 146 MMHG | BODY MASS INDEX: 28.56 KG/M2

## 2024-10-10 DIAGNOSIS — I10 ESSENTIAL HYPERTENSION: ICD-10-CM

## 2024-10-10 DIAGNOSIS — N18.31 STAGE 3A CHRONIC KIDNEY DISEASE (MULTI): Primary | ICD-10-CM

## 2024-10-10 PROCEDURE — 1157F ADVNC CARE PLAN IN RCRD: CPT | Performed by: INTERNAL MEDICINE

## 2024-10-10 PROCEDURE — 3008F BODY MASS INDEX DOCD: CPT | Performed by: INTERNAL MEDICINE

## 2024-10-10 PROCEDURE — 1159F MED LIST DOCD IN RCRD: CPT | Performed by: INTERNAL MEDICINE

## 2024-10-10 PROCEDURE — 3079F DIAST BP 80-89 MM HG: CPT | Performed by: INTERNAL MEDICINE

## 2024-10-10 PROCEDURE — 1036F TOBACCO NON-USER: CPT | Performed by: INTERNAL MEDICINE

## 2024-10-10 PROCEDURE — 3077F SYST BP >= 140 MM HG: CPT | Performed by: INTERNAL MEDICINE

## 2024-10-10 PROCEDURE — 1126F AMNT PAIN NOTED NONE PRSNT: CPT | Performed by: INTERNAL MEDICINE

## 2024-10-10 PROCEDURE — 1123F ACP DISCUSS/DSCN MKR DOCD: CPT | Performed by: INTERNAL MEDICINE

## 2024-10-10 PROCEDURE — 99213 OFFICE O/P EST LOW 20 MIN: CPT | Performed by: INTERNAL MEDICINE

## 2024-10-10 ASSESSMENT — PAIN SCALES - GENERAL: PAINLEVEL: 0-NO PAIN

## 2024-10-10 NOTE — PROGRESS NOTES
Patient ID:       69 year old man seen in follow up for elevated serum creatinine, noted to be rising since 2018. blood work performed at last visit showed serum creatinine 1.4 mg/dL, repeat in september was again 1.4 mg/dL. urinalysis was again normal, with dilute urine based on specific gravity, and there was no proteinuria.      reports history of hypertension for the past few years. denies any history of diabetes, MI, stroke, PVD. was taking celecoxib twice daily for the past 3-4 years for arthritis related pain but has since stopped.      home blood pressures are in the low 130's systolic per patient report.     denies any other OTC medication use, flank pain, gross hematuria, dysuria, notes some slow urine stream and dribbling. no nocturia or urinary frequency.      denies any rashes, bruises, epistaxis, foamy urine, headache, vision changes, chest pain.      no family history of ESRD or autoimmune disease.      Review of systems negative unless as noted in HPI.            Patient Active Problem List   Diagnosis    Anxiety    Arthritis    Arthritis of knee    Back pain    Essential (primary) hypertension    Bursitis of elbow    Chronic kidney disease    Enlarged prostate    GUANACO (generalized anxiety disorder)    GERD (gastroesophageal reflux disease)    High creatinine    Hypercholesterolemia    Hyperglycemia    Hypertensive kidney disease    Low hemoglobin    Neuropathic pain    Obesity, Class I, BMI 30.0-34.9 (see actual BMI)    Primary localized osteoarthrosis of shoulder region    Lumbar radiculopathy    Spondylolysis    Spondylosis of lumbar spine    Stage 3a chronic kidney disease (Multi)    Subacute sinusitis    Bradycardia    Allergic rhinitis due to allergen    History of arthroplasty of knee    Gastroesophageal reflux disease without esophagitis    Hyperlipidemia, unspecified    Artificial knee joint present, bilateral    Sleep apnea    History of total right knee replacement    DJD (degenerative  joint disease), lumbar    Gout    Hyponatremia    Lumbar spondylosis    Personal history of arthritis    S/P lumbar fusion    Acute otitis externa of right ear    Sore nose        Family History   Problem Relation Name Age of Onset    Coronary artery disease Mother      Cancer Mother      Stroke Mother      Hypertension Mother      Coronary artery disease Father      Cancer Brother      Other (coagulation defects) Other family history     Hearing loss Other family history        Social History     Tobacco Use    Smoking status: Never    Smokeless tobacco: Never   Substance Use Topics    Alcohol use: Not Currently             Current Outpatient Medications:     albuterol 90 mcg/actuation inhaler, Inhale 2 puffs every 4 hours if needed., Disp: , Rfl:     aspirin 81 mg EC tablet, Take 1 tablet (81 mg) by mouth once daily., Disp: , Rfl:     atorvastatin (Lipitor) 20 mg tablet, Take 1 tablet (20 mg) by mouth once daily at bedtime., Disp: 90 tablet, Rfl: 1    b complex 0.4 mg tablet, Take 1 tablet by mouth once daily., Disp: , Rfl:     diphenhydrAMINE (Benadryl Allergy) 25 mg tablet, Take 1 tablet (25 mg) by mouth as needed at bedtime., Disp: , Rfl:     DULoxetine (Cymbalta) 30 mg DR capsule, Take 1 capsule (30 mg) by mouth once daily., Disp: 90 capsule, Rfl: 1    famotidine (Pepcid) 20 mg tablet, Take 1 tablet (20 mg) by mouth 2 times a day., Disp: , Rfl:     fexofenadine (Allegra Allergy) 180 mg tablet, Take 1 tablet (180 mg) by mouth once daily., Disp: , Rfl:     lisinopril 10 mg tablet, Take 1 tablet (10 mg) by mouth once daily., Disp: 90 tablet, Rfl: 1    metoprolol succinate XL (Toprol-XL) 25 mg 24 hr tablet, Take 1 tablet (25 mg) by mouth once daily. Do not crush or chew., Disp: 90 tablet, Rfl: 1    multivitamin tablet, Take 1 tablet by mouth once daily., Disp: , Rfl:     gabapentin (Neurontin) 300 mg capsule, Take 2 capsules (600 mg) by mouth 2 times a day. (Patient taking differently: Take 1 capsule (300 mg) by  mouth 2 times a day.), Disp: 180 capsule, Rfl: 1    polyethylene glycol-electrolytes (Golytely) 420 gram solution, STARTING AT 6PM DRINK HALF OF THE BOTTLE, DRINK THE OTHER HALF 5 HRS BEFORE PROCEDURE TIME (Patient not taking: Reported on 10/10/2024), Disp: 4000 mL, Rfl: 0       Vitals:    10/10/24 0845   BP: 146/86   Pulse: 65        Physical Exam  Gen alert, pleasant  Heart RRR, no murmurs or rubs  Lungs clear bilaterally  Abd soft, no bruits noted  Ext no lower extremity edema bilaterally, normal pulses  Skin no bruises or rashes on visible skin surfaces  Neuro no asterixis, no focal deficits  Psych normal affect      Lab Results   Component Value Date    WBC 7.9 09/27/2024    HGB 13.3 (L) 09/27/2024    HCT 40.2 (L) 09/27/2024    MCV 88 09/27/2024     09/27/2024      Lab Results   Component Value Date    GLUCOSE 99 09/27/2024    CALCIUM 9.6 09/27/2024     09/27/2024    K 4.4 09/27/2024    CO2 25 09/27/2024     09/27/2024    BUN 25 (H) 09/27/2024    CREATININE 1.42 (H) 09/27/2024      Lab Results   Component Value Date    MICROALBCREA  09/27/2024      Comment:      One or more analytes used in this calculation is outside of the analytical measurement range. Calculation cannot be performed.        Lab Results   Component Value Date    PTH 71.8 08/30/2023    UTPCR 0.11 10/27/2022                Assessment/Plan     CKD 3a - etiology not fully clear. possibly related to celecoxib use, particularly given concomitant use of ace inhibition. no electrolyte abnormalities. renal function stable with no electrolyte abnormalities or proteinuria.     htn - home bp's are controlled on the current medication regimen.        follow up in 12 months

## 2024-10-14 DIAGNOSIS — I10 ESSENTIAL (PRIMARY) HYPERTENSION: ICD-10-CM

## 2024-10-14 RX ORDER — METOPROLOL SUCCINATE 25 MG/1
25 TABLET, EXTENDED RELEASE ORAL DAILY
Qty: 90 TABLET | Refills: 0 | Status: SHIPPED | OUTPATIENT
Start: 2024-10-14

## 2024-11-05 ENCOUNTER — APPOINTMENT (OUTPATIENT)
Dept: ORTHOPEDIC SURGERY | Facility: CLINIC | Age: 69
End: 2024-11-05
Payer: COMMERCIAL

## 2024-11-14 ENCOUNTER — APPOINTMENT (OUTPATIENT)
Dept: NEPHROLOGY | Facility: CLINIC | Age: 69
End: 2024-11-14
Payer: COMMERCIAL

## 2024-11-19 ENCOUNTER — HOSPITAL ENCOUNTER (OUTPATIENT)
Dept: RADIOLOGY | Facility: CLINIC | Age: 69
Discharge: HOME | End: 2024-11-19
Payer: COMMERCIAL

## 2024-11-19 ENCOUNTER — OFFICE VISIT (OUTPATIENT)
Dept: ORTHOPEDIC SURGERY | Facility: CLINIC | Age: 69
End: 2024-11-19
Payer: COMMERCIAL

## 2024-11-19 DIAGNOSIS — M54.16 LUMBAR RADICULOPATHY: ICD-10-CM

## 2024-11-19 DIAGNOSIS — Z98.1 STATUS POST LUMBAR SPINAL FUSION: Primary | ICD-10-CM

## 2024-11-19 PROCEDURE — 72100 X-RAY EXAM L-S SPINE 2/3 VWS: CPT

## 2024-11-19 PROCEDURE — 1123F ACP DISCUSS/DSCN MKR DOCD: CPT

## 2024-11-19 PROCEDURE — 1157F ADVNC CARE PLAN IN RCRD: CPT

## 2024-11-19 PROCEDURE — 72100 X-RAY EXAM L-S SPINE 2/3 VWS: CPT | Performed by: RADIOLOGY

## 2024-11-19 PROCEDURE — 1159F MED LIST DOCD IN RCRD: CPT

## 2024-11-19 PROCEDURE — 99213 OFFICE O/P EST LOW 20 MIN: CPT

## 2024-11-19 PROCEDURE — 1125F AMNT PAIN NOTED PAIN PRSNT: CPT

## 2024-11-19 ASSESSMENT — PAIN SCALES - GENERAL: PAINLEVEL_OUTOF10: 2

## 2024-11-19 ASSESSMENT — PAIN DESCRIPTION - DESCRIPTORS: DESCRIPTORS: SORE

## 2024-11-19 ASSESSMENT — PAIN - FUNCTIONAL ASSESSMENT: PAIN_FUNCTIONAL_ASSESSMENT: 0-10

## 2024-11-19 NOTE — PROGRESS NOTES
HPI:  Angel Jordan is a 69-year-old male who presents today for a 1 year follow-up regarding L5-S1 anterior lumbar fusion; posterior instrumentation with Dr. Farrell on 11/2/2023.  Overall, he is doing well.  He is exercising ambulating as tolerated.  He does have some transient numbness and tingling in an L4/5 distribution on the right.  No symptoms in the left.  He denies pain in the legs.  It is not constant.  He does take gabapentin and duloxetine.  Otherwise, no questions or concerns at time of visit.    ROS:  Reviewed on EMR and patient intake sheet.    PMH/SH:  Reviewed on EMR and patient intake sheet.    Exam:  MSK: Full strength range of motion of lower extremities bilaterally.  General: No acute distress. Awake and conversant.  Eyes: Normal conjunctiva, anicteric. Round symmetric pupils.  ENT: Hearing grossly intact. No nasal discharge.  Neck: Neck is supple. No masses or thyromegaly.  Respiratory: Respirations are non-labored. No wheezing.  Skin: Warm. No rashes or ulcers.  Psych: Alert and oriented. Cooperative, appropriate mood and affect, normal judgement.  CV: No lower extremity edema.  Neuro: Sensation and CN II-XII grossly normal.    Radiology:     X-rays of lumbar spine personally reviewed and demonstrate anterior lumbar fusion at L5/S1 seated appropriately with pedicle screw instrumentation and rods seated appropriately.  No signs of instrumentation failure or malalignment.  Lateral listhesis of L3 on L4.  Lateral wedging of L4 and L5.    Diagnosis:    Status post lumbar fusion  Lumbar radiculopathy  Lumbar lateral listhesis    Assessment and Plan:  Patient was seen today evaluate for 1 year follow-up visit.  Overall, he is doing well.  I educated the patient on his transient numbness and tingling the right leg and that is possible it is coming from the level above the fusion at the L4/5 level.  I recommended continuing gabapentin, duloxetine, and following up if his symptoms ever become  constant, or if pain develops.  At that point, we can discuss other management such as physical therapy, injections, and potentially surgery in the future.  Hopefully, that will not be necessary in the future.  We discussed exercising, ambulating, and restrictions today.  He may follow-up on an as-needed basis.  Patient feels her questions were answered today.  Patient agrees to the plan above.    **This note was dictated using speech recognition software and was not corrected for spelling or grammatical errors**    Colten Otoole PA-C  Department of Orthopaedic Surgery  8:20 AM  11/19/24    [address] [place] [address]    Voicemail: [phone]   Appts: [phone]  Fax: [phone]

## 2024-12-08 DIAGNOSIS — I10 ESSENTIAL (PRIMARY) HYPERTENSION: ICD-10-CM

## 2024-12-09 RX ORDER — LISINOPRIL 10 MG/1
10 TABLET ORAL DAILY
Qty: 90 TABLET | Refills: 0 | Status: SHIPPED | OUTPATIENT
Start: 2024-12-09

## 2024-12-12 ENCOUNTER — APPOINTMENT (OUTPATIENT)
Dept: GASTROENTEROLOGY | Facility: EXTERNAL LOCATION | Age: 69
End: 2024-12-12
Payer: COMMERCIAL

## 2024-12-12 DIAGNOSIS — Z12.11 ENCOUNTER FOR SCREENING FOR MALIGNANT NEOPLASM OF COLON: ICD-10-CM

## 2024-12-12 DIAGNOSIS — D12.5 BENIGN NEOPLASM OF SIGMOID COLON: Primary | ICD-10-CM

## 2024-12-12 PROCEDURE — 45385 COLONOSCOPY W/LESION REMOVAL: CPT | Performed by: INTERNAL MEDICINE

## 2024-12-12 PROCEDURE — 88305 TISSUE EXAM BY PATHOLOGIST: CPT | Performed by: PATHOLOGY

## 2024-12-12 PROCEDURE — 1123F ACP DISCUSS/DSCN MKR DOCD: CPT | Performed by: INTERNAL MEDICINE

## 2024-12-12 PROCEDURE — 88305 TISSUE EXAM BY PATHOLOGIST: CPT

## 2024-12-12 PROCEDURE — 45381 COLONOSCOPY SUBMUCOUS NJX: CPT | Performed by: INTERNAL MEDICINE

## 2024-12-12 PROCEDURE — 1157F ADVNC CARE PLAN IN RCRD: CPT | Performed by: INTERNAL MEDICINE

## 2024-12-13 ENCOUNTER — LAB REQUISITION (OUTPATIENT)
Dept: LAB | Facility: HOSPITAL | Age: 69
End: 2024-12-13
Payer: COMMERCIAL

## 2024-12-20 DIAGNOSIS — M54.16 LUMBAR RADICULOPATHY: ICD-10-CM

## 2024-12-20 RX ORDER — GABAPENTIN 300 MG/1
600 CAPSULE ORAL 2 TIMES DAILY
COMMUNITY
End: 2024-12-20 | Stop reason: SDUPTHER

## 2024-12-20 RX ORDER — GABAPENTIN 300 MG/1
600 CAPSULE ORAL 2 TIMES DAILY
Qty: 360 CAPSULE | Refills: 0 | Status: SHIPPED | OUTPATIENT
Start: 2024-12-20

## 2024-12-24 LAB
LABORATORY COMMENT REPORT: NORMAL
PATH REPORT.FINAL DX SPEC: NORMAL
PATH REPORT.GROSS SPEC: NORMAL
PATH REPORT.RELEVANT HX SPEC: NORMAL
PATH REPORT.TOTAL CANCER: NORMAL

## 2024-12-25 NOTE — RESULT ENCOUNTER NOTE
The polyp that was removed from your colon was an adenoma (benign but precancerous).  The recommendation is to repeat colonoscopy in 1 year.      Martha Lawson MD

## 2025-01-16 DIAGNOSIS — E78.00 HYPERCHOLESTEROLEMIA: ICD-10-CM

## 2025-01-17 RX ORDER — ATORVASTATIN CALCIUM 20 MG/1
TABLET, FILM COATED ORAL
Qty: 90 TABLET | Refills: 0 | Status: SHIPPED | OUTPATIENT
Start: 2025-01-17

## 2025-01-27 ENCOUNTER — APPOINTMENT (OUTPATIENT)
Dept: PRIMARY CARE | Facility: CLINIC | Age: 70
End: 2025-01-27
Payer: COMMERCIAL

## 2025-01-29 ENCOUNTER — APPOINTMENT (OUTPATIENT)
Dept: PRIMARY CARE | Facility: CLINIC | Age: 70
End: 2025-01-29
Payer: COMMERCIAL

## 2025-01-29 VITALS
SYSTOLIC BLOOD PRESSURE: 145 MMHG | RESPIRATION RATE: 18 BRPM | DIASTOLIC BLOOD PRESSURE: 90 MMHG | HEIGHT: 67 IN | WEIGHT: 183 LBS | OXYGEN SATURATION: 96 % | HEART RATE: 63 BPM | BODY MASS INDEX: 28.72 KG/M2

## 2025-01-29 DIAGNOSIS — E78.5 HYPERLIPIDEMIA, UNSPECIFIED HYPERLIPIDEMIA TYPE: ICD-10-CM

## 2025-01-29 DIAGNOSIS — K21.9 GASTROESOPHAGEAL REFLUX DISEASE WITHOUT ESOPHAGITIS: ICD-10-CM

## 2025-01-29 DIAGNOSIS — Z98.1 S/P LUMBAR FUSION: ICD-10-CM

## 2025-01-29 DIAGNOSIS — M54.50 CHRONIC LOW BACK PAIN, UNSPECIFIED BACK PAIN LATERALITY, UNSPECIFIED WHETHER SCIATICA PRESENT: ICD-10-CM

## 2025-01-29 DIAGNOSIS — G89.29 CHRONIC LOW BACK PAIN, UNSPECIFIED BACK PAIN LATERALITY, UNSPECIFIED WHETHER SCIATICA PRESENT: ICD-10-CM

## 2025-01-29 DIAGNOSIS — E55.9 VITAMIN D DEFICIENCY: ICD-10-CM

## 2025-01-29 DIAGNOSIS — N18.30 STAGE 3 CHRONIC KIDNEY DISEASE, UNSPECIFIED WHETHER STAGE 3A OR 3B CKD (MULTI): ICD-10-CM

## 2025-01-29 DIAGNOSIS — N40.0 ENLARGED PROSTATE: ICD-10-CM

## 2025-01-29 DIAGNOSIS — E87.1 HYPONATREMIA: ICD-10-CM

## 2025-01-29 DIAGNOSIS — I10 ESSENTIAL (PRIMARY) HYPERTENSION: ICD-10-CM

## 2025-01-29 PROCEDURE — 1036F TOBACCO NON-USER: CPT | Performed by: INTERNAL MEDICINE

## 2025-01-29 PROCEDURE — 1157F ADVNC CARE PLAN IN RCRD: CPT | Performed by: INTERNAL MEDICINE

## 2025-01-29 PROCEDURE — 3008F BODY MASS INDEX DOCD: CPT | Performed by: INTERNAL MEDICINE

## 2025-01-29 PROCEDURE — 3080F DIAST BP >= 90 MM HG: CPT | Performed by: INTERNAL MEDICINE

## 2025-01-29 PROCEDURE — 99214 OFFICE O/P EST MOD 30 MIN: CPT | Performed by: INTERNAL MEDICINE

## 2025-01-29 PROCEDURE — 1159F MED LIST DOCD IN RCRD: CPT | Performed by: INTERNAL MEDICINE

## 2025-01-29 PROCEDURE — 1160F RVW MEDS BY RX/DR IN RCRD: CPT | Performed by: INTERNAL MEDICINE

## 2025-01-29 PROCEDURE — 3077F SYST BP >= 140 MM HG: CPT | Performed by: INTERNAL MEDICINE

## 2025-01-29 PROCEDURE — 1123F ACP DISCUSS/DSCN MKR DOCD: CPT | Performed by: INTERNAL MEDICINE

## 2025-01-29 PROCEDURE — 1125F AMNT PAIN NOTED PAIN PRSNT: CPT | Performed by: INTERNAL MEDICINE

## 2025-01-29 ASSESSMENT — PATIENT HEALTH QUESTIONNAIRE - PHQ9
SUM OF ALL RESPONSES TO PHQ9 QUESTIONS 1 AND 2: 0
1. LITTLE INTEREST OR PLEASURE IN DOING THINGS: NOT AT ALL
2. FEELING DOWN, DEPRESSED OR HOPELESS: NOT AT ALL

## 2025-01-29 ASSESSMENT — PAIN SCALES - GENERAL: PAINLEVEL_OUTOF10: 2

## 2025-01-29 NOTE — PROGRESS NOTES
Patient ID: States he is doing ok. He states he takes Cymbalta and gabapentin for his back pain, states it is well controlled. He denies depression. He states he had his colonoscopy last month, large polyp removed, has to repeat in one year. He states he gets up 2-3 times per night to urinate, feels he empties his bladder ok. He states he is pretty active, golfs every chance he can, walked one mile on treadmill this am for one mile on an incline. He denies any SOB, cough or SOB. Denies dizziness or lightheadedness. He states he had never had issues with his heart, has never seen cardiologist.     HEALTH MAINTENANCE: Follow Up  Last Office Visit: 7/15/24  Last Labs: 3/15/24  PSA Screening (starting at age 55 till 69): 3/15/24 wnl  Colonoscopy (45-75 or age 40 with 1st degree relative dx colon ca): 12/12/24 one 25mm polyp removed from sigmoid colon, pathology tubular adenoma, advised to repeat in one year  Lung cancer screening (50-76 y/o x 20 pk yr for at least 20 yrs + current smoker OR quit in last 15 years, no CT w/I last year): never smoker  DEXA (65+, q 2 years women and 70+ men):     --followed by ortho, s/p L5-S1 anterior lumbar fusion; posterior instrumentation with Dr. Farrell on 11/2/2023.     HPI Angel Jordan is a 69 y.o. male with PMH remarkable for HTN, HLD, CKD3, GERD, allergic rhinitis, L5-S1 anterior lumbar fusion; posterior instrumentation with Dr. Farrell on 11/2/2023 who presents to the office today for Follow-up.    Social History     Tobacco Use    Smoking status: Never    Smokeless tobacco: Never   Substance Use Topics    Alcohol use: Not Currently    Drug use: Never     Review of Systems   Constitutional: Negative.    HENT: Negative.     Respiratory: Negative.     Cardiovascular: Negative.    Gastrointestinal: Negative.    Genitourinary: Negative.    Musculoskeletal: Negative.    Skin: Negative.    Neurological: Negative.    Psychiatric/Behavioral: Negative.       Visit Vitals  /90  "  Pulse 63   Resp 18   Ht 1.702 m (5' 7\")   Wt 83 kg (183 lb)   SpO2 96%   BMI 28.66 kg/m²   Smoking Status Never   BSA 1.98 m²     Physical Exam  Vitals reviewed.   Constitutional:       Appearance: Normal appearance.   HENT:      Head: Normocephalic and atraumatic.   Cardiovascular:      Rate and Rhythm: Normal rate and regular rhythm.      Pulses: Normal pulses.      Heart sounds: Normal heart sounds.   Pulmonary:      Effort: Pulmonary effort is normal.      Breath sounds: Normal breath sounds.   Abdominal:      General: Bowel sounds are normal.      Palpations: Abdomen is soft.   Musculoskeletal:         General: Normal range of motion.   Skin:     General: Skin is warm and dry.   Neurological:      General: No focal deficit present.      Mental Status: He is alert and oriented to person, place, and time.   Psychiatric:         Mood and Affect: Mood normal.         Behavior: Behavior normal.       Current Outpatient Medications   Medication Instructions    albuterol 90 mcg/actuation inhaler 2 puffs, Every 4 hours PRN    aspirin 81 mg EC tablet 1 tablet, Daily    atorvastatin (Lipitor) 20 mg tablet TAKE 1 TABLET(20 MG) BY MOUTH DAILY AT BEDTIME    b complex 0.4 mg tablet 1 tablet, Daily    diphenhydrAMINE (Benadryl Allergy) 25 mg tablet 1 tablet, Nightly PRN    DULoxetine (CYMBALTA) 30 mg, oral, Daily    famotidine (Pepcid) 20 mg tablet 1 tablet, 2 times daily    fexofenadine (Allegra Allergy) 180 mg tablet 1 tablet, Daily    gabapentin (NEURONTIN) 600 mg, oral, 2 times daily    lisinopril 10 mg, oral, Daily    metoprolol succinate XL (TOPROL-XL) 25 mg, oral, Daily, Do not crush or chew.    multivitamin tablet 1 tablet, Daily    polyethylene glycol-electrolytes (Golytely) 420 gram solution STARTING AT 6PM DRINK HALF OF THE BOTTLE, DRINK THE OTHER HALF 5 HRS BEFORE PROCEDURE TIME      Lab Results   Component Value Date    WBC 7.9 09/27/2024    HGB 13.3 (L) 09/27/2024    HCT 40.2 (L) 09/27/2024     " 09/27/2024    CHOL 138 03/15/2024    TRIG 265 (H) 03/15/2024    HDL 29.7 03/15/2024    ALT 15 03/15/2024    AST 19 03/15/2024     09/27/2024    K 4.4 09/27/2024     09/27/2024    CREATININE 1.42 (H) 09/27/2024    BUN 25 (H) 09/27/2024    CO2 25 09/27/2024    TSH 1.46 03/15/2024    INR 1.0 10/26/2023    HGBA1C 5.1 03/15/2024     Problem List Items Addressed This Visit             ICD-10-CM    Back pain M54.9    Essential (primary) hypertension I10     Continue with Lisinopril and Metoprolol         Relevant Orders    Tsh With Reflex To Free T4 If Abnormal    Chronic kidney disease N18.9    Enlarged prostate N40.0    Relevant Orders    Prostate Spec.Ag,Screen    GERD (gastroesophageal reflux disease) K21.9     Stable on Pepcid         Relevant Orders    CBC and Auto Differential    Vitamin B12    Hyperlipidemia, unspecified E78.5     Continue with statin         Relevant Orders    Lipid panel    Hyponatremia E87.1    Relevant Orders    Comprehensive metabolic panel    S/P lumbar fusion Z98.1     Followed by ortho  Continue with cymbalta and gabapentin          Other Visit Diagnoses         Codes    Vitamin D deficiency     E55.9    Relevant Orders    Vitamin D 25-Hydroxy,Total (for eval of Vitamin D levels)            --------------------  Written by Sharonda Ellington RN, acting as a scribe for Dr. Bryant. This note accurately reflects the work and decisions made by Dr. Bryant.     I, Dr. Bryant, attest all medical record entries made by the scribe were under my direction and were personally dictated by me. I have reviewed the chart and agree that the record accurately reflects my performance of the history, physical exam, and assessment and plan.

## 2025-01-31 ASSESSMENT — ENCOUNTER SYMPTOMS
PSYCHIATRIC NEGATIVE: 1
RESPIRATORY NEGATIVE: 1
NEUROLOGICAL NEGATIVE: 1
CARDIOVASCULAR NEGATIVE: 1
MUSCULOSKELETAL NEGATIVE: 1
GASTROINTESTINAL NEGATIVE: 1
CONSTITUTIONAL NEGATIVE: 1

## 2025-01-31 NOTE — PATIENT INSTRUCTIONS
It was great to see you in the office today! Here is what we discussed at your visit today:  Please continue to take your current medications   Please get bloodwork drawn as soon as you are able. We will call you with results.  Follow up in four months

## 2025-02-01 DIAGNOSIS — M54.16 LUMBAR RADICULOPATHY: ICD-10-CM

## 2025-02-01 DIAGNOSIS — M47.816 LUMBAR SPONDYLOSIS: ICD-10-CM

## 2025-02-03 RX ORDER — DULOXETIN HYDROCHLORIDE 30 MG/1
30 CAPSULE, DELAYED RELEASE ORAL DAILY
Qty: 90 CAPSULE | Refills: 1 | Status: SHIPPED | OUTPATIENT
Start: 2025-02-03

## 2025-03-24 DIAGNOSIS — M54.16 LUMBAR RADICULOPATHY: ICD-10-CM

## 2025-03-24 RX ORDER — GABAPENTIN 300 MG/1
600 CAPSULE ORAL 2 TIMES DAILY
Qty: 360 CAPSULE | Refills: 1 | Status: SHIPPED | OUTPATIENT
Start: 2025-03-24

## 2025-03-26 DIAGNOSIS — I10 ESSENTIAL (PRIMARY) HYPERTENSION: ICD-10-CM

## 2025-03-26 RX ORDER — LISINOPRIL 10 MG/1
10 TABLET ORAL DAILY
Qty: 90 TABLET | Refills: 0 | Status: SHIPPED | OUTPATIENT
Start: 2025-03-26

## 2025-04-18 DIAGNOSIS — E78.00 HYPERCHOLESTEROLEMIA: ICD-10-CM

## 2025-04-18 RX ORDER — ATORVASTATIN CALCIUM 20 MG/1
20 TABLET, FILM COATED ORAL DAILY
Qty: 90 TABLET | Refills: 0 | Status: SHIPPED | OUTPATIENT
Start: 2025-04-18

## 2025-04-21 DIAGNOSIS — I10 ESSENTIAL (PRIMARY) HYPERTENSION: ICD-10-CM

## 2025-04-21 RX ORDER — METOPROLOL SUCCINATE 25 MG/1
25 TABLET, EXTENDED RELEASE ORAL DAILY
Qty: 90 TABLET | Refills: 1 | Status: SHIPPED | OUTPATIENT
Start: 2025-04-21

## 2025-04-29 DIAGNOSIS — E87.1 HYPONATREMIA: ICD-10-CM

## 2025-04-29 DIAGNOSIS — N40.0 ENLARGED PROSTATE: ICD-10-CM

## 2025-04-29 DIAGNOSIS — E55.9 VITAMIN D DEFICIENCY: ICD-10-CM

## 2025-04-29 DIAGNOSIS — K21.9 GASTROESOPHAGEAL REFLUX DISEASE WITHOUT ESOPHAGITIS: ICD-10-CM

## 2025-04-29 DIAGNOSIS — E78.5 HYPERLIPIDEMIA, UNSPECIFIED HYPERLIPIDEMIA TYPE: ICD-10-CM

## 2025-04-29 DIAGNOSIS — I10 ESSENTIAL (PRIMARY) HYPERTENSION: ICD-10-CM

## 2025-05-05 DIAGNOSIS — M54.16 LUMBAR RADICULOPATHY: ICD-10-CM

## 2025-05-05 DIAGNOSIS — M47.816 LUMBAR SPONDYLOSIS: ICD-10-CM

## 2025-05-05 RX ORDER — DULOXETIN HYDROCHLORIDE 30 MG/1
30 CAPSULE, DELAYED RELEASE ORAL DAILY
Qty: 90 CAPSULE | Refills: 1 | Status: SHIPPED | OUTPATIENT
Start: 2025-05-05

## 2025-06-23 DIAGNOSIS — I10 ESSENTIAL (PRIMARY) HYPERTENSION: ICD-10-CM

## 2025-06-23 RX ORDER — LISINOPRIL 10 MG/1
10 TABLET ORAL DAILY
Qty: 90 TABLET | Refills: 0 | Status: SHIPPED | OUTPATIENT
Start: 2025-06-23

## 2025-06-25 DIAGNOSIS — M54.16 LUMBAR RADICULOPATHY: ICD-10-CM

## 2025-06-27 RX ORDER — GABAPENTIN 300 MG/1
600 CAPSULE ORAL 2 TIMES DAILY
Qty: 360 CAPSULE | Refills: 1 | Status: SHIPPED | OUTPATIENT
Start: 2025-06-27

## 2025-07-18 DIAGNOSIS — E78.00 HYPERCHOLESTEROLEMIA: ICD-10-CM

## 2025-07-18 RX ORDER — ATORVASTATIN CALCIUM 20 MG/1
20 TABLET, FILM COATED ORAL DAILY
Qty: 90 TABLET | Refills: 0 | Status: SHIPPED | OUTPATIENT
Start: 2025-07-18

## 2025-07-30 DIAGNOSIS — N18.31 STAGE 3A CHRONIC KIDNEY DISEASE (MULTI): Primary | ICD-10-CM

## 2025-08-04 DIAGNOSIS — M54.16 LUMBAR RADICULOPATHY: ICD-10-CM

## 2025-08-04 DIAGNOSIS — M47.816 LUMBAR SPONDYLOSIS: ICD-10-CM

## 2025-08-04 RX ORDER — DULOXETIN HYDROCHLORIDE 30 MG/1
30 CAPSULE, DELAYED RELEASE ORAL DAILY
Qty: 90 CAPSULE | Refills: 1 | Status: SHIPPED | OUTPATIENT
Start: 2025-08-04

## 2025-08-05 ENCOUNTER — APPOINTMENT (OUTPATIENT)
Dept: PRIMARY CARE | Facility: CLINIC | Age: 70
End: 2025-08-05
Payer: COMMERCIAL

## 2025-08-12 ENCOUNTER — APPOINTMENT (OUTPATIENT)
Dept: PRIMARY CARE | Facility: CLINIC | Age: 70
End: 2025-08-12
Payer: COMMERCIAL

## 2025-08-12 VITALS
WEIGHT: 165 LBS | HEIGHT: 67 IN | DIASTOLIC BLOOD PRESSURE: 78 MMHG | BODY MASS INDEX: 25.9 KG/M2 | OXYGEN SATURATION: 97 % | HEART RATE: 47 BPM | SYSTOLIC BLOOD PRESSURE: 131 MMHG | RESPIRATION RATE: 18 BRPM

## 2025-08-12 DIAGNOSIS — Z11.59 NEED FOR HEPATITIS C SCREENING TEST: ICD-10-CM

## 2025-08-12 DIAGNOSIS — E78.00 HYPERCHOLESTEROLEMIA: ICD-10-CM

## 2025-08-12 DIAGNOSIS — I10 ESSENTIAL (PRIMARY) HYPERTENSION: ICD-10-CM

## 2025-08-12 DIAGNOSIS — N18.31 STAGE 3A CHRONIC KIDNEY DISEASE (MULTI): ICD-10-CM

## 2025-08-12 DIAGNOSIS — M79.2 NEUROPATHIC PAIN: ICD-10-CM

## 2025-08-12 DIAGNOSIS — Z12.5 SCREENING PSA (PROSTATE SPECIFIC ANTIGEN): ICD-10-CM

## 2025-08-12 DIAGNOSIS — Z00.00 WELCOME TO MEDICARE PREVENTIVE VISIT: ICD-10-CM

## 2025-08-12 PROCEDURE — 3008F BODY MASS INDEX DOCD: CPT | Performed by: INTERNAL MEDICINE

## 2025-08-12 PROCEDURE — 1170F FXNL STATUS ASSESSED: CPT | Performed by: INTERNAL MEDICINE

## 2025-08-12 PROCEDURE — 3078F DIAST BP <80 MM HG: CPT | Performed by: INTERNAL MEDICINE

## 2025-08-12 PROCEDURE — 1036F TOBACCO NON-USER: CPT | Performed by: INTERNAL MEDICINE

## 2025-08-12 PROCEDURE — 3075F SYST BP GE 130 - 139MM HG: CPT | Performed by: INTERNAL MEDICINE

## 2025-08-12 PROCEDURE — G0402 INITIAL PREVENTIVE EXAM: HCPCS | Performed by: INTERNAL MEDICINE

## 2025-08-12 PROCEDURE — 99214 OFFICE O/P EST MOD 30 MIN: CPT | Performed by: INTERNAL MEDICINE

## 2025-08-12 PROCEDURE — 1125F AMNT PAIN NOTED PAIN PRSNT: CPT | Performed by: INTERNAL MEDICINE

## 2025-08-12 PROCEDURE — 1159F MED LIST DOCD IN RCRD: CPT | Performed by: INTERNAL MEDICINE

## 2025-08-12 PROCEDURE — 1160F RVW MEDS BY RX/DR IN RCRD: CPT | Performed by: INTERNAL MEDICINE

## 2025-08-12 ASSESSMENT — PAIN SCALES - GENERAL: PAINLEVEL_OUTOF10: 2

## 2025-08-12 ASSESSMENT — ACTIVITIES OF DAILY LIVING (ADL)
DOING_HOUSEWORK: INDEPENDENT
MANAGING_FINANCES: INDEPENDENT
DRESSING: INDEPENDENT
BATHING: INDEPENDENT
GROCERY_SHOPPING: INDEPENDENT
TAKING_MEDICATION: INDEPENDENT

## 2025-08-12 ASSESSMENT — PATIENT HEALTH QUESTIONNAIRE - PHQ9
2. FEELING DOWN, DEPRESSED OR HOPELESS: NOT AT ALL
SUM OF ALL RESPONSES TO PHQ9 QUESTIONS 1 AND 2: 0
1. LITTLE INTEREST OR PLEASURE IN DOING THINGS: NOT AT ALL

## 2025-08-13 ASSESSMENT — ENCOUNTER SYMPTOMS
MUSCULOSKELETAL NEGATIVE: 1
RESPIRATORY NEGATIVE: 1
ENDOCRINE NEGATIVE: 1
NEUROLOGICAL NEGATIVE: 1
CARDIOVASCULAR NEGATIVE: 1
EYES NEGATIVE: 1
PSYCHIATRIC NEGATIVE: 1
CONSTITUTIONAL NEGATIVE: 1
GASTROINTESTINAL NEGATIVE: 1

## 2025-08-19 ENCOUNTER — OFFICE VISIT (OUTPATIENT)
Dept: PRIMARY CARE | Facility: CLINIC | Age: 70
End: 2025-08-19
Payer: MEDICARE

## 2025-08-19 VITALS
HEART RATE: 60 BPM | BODY MASS INDEX: 26.06 KG/M2 | SYSTOLIC BLOOD PRESSURE: 144 MMHG | DIASTOLIC BLOOD PRESSURE: 84 MMHG | WEIGHT: 166 LBS | HEIGHT: 67 IN | RESPIRATION RATE: 18 BRPM | OXYGEN SATURATION: 97 %

## 2025-08-19 DIAGNOSIS — H60.11 CELLULITIS OF RIGHT EAR: Primary | ICD-10-CM

## 2025-08-19 PROCEDURE — 1160F RVW MEDS BY RX/DR IN RCRD: CPT

## 2025-08-19 PROCEDURE — 1126F AMNT PAIN NOTED NONE PRSNT: CPT

## 2025-08-19 PROCEDURE — 99213 OFFICE O/P EST LOW 20 MIN: CPT

## 2025-08-19 PROCEDURE — 3008F BODY MASS INDEX DOCD: CPT

## 2025-08-19 PROCEDURE — 3077F SYST BP >= 140 MM HG: CPT

## 2025-08-19 PROCEDURE — 1036F TOBACCO NON-USER: CPT

## 2025-08-19 PROCEDURE — 3079F DIAST BP 80-89 MM HG: CPT

## 2025-08-19 PROCEDURE — 1159F MED LIST DOCD IN RCRD: CPT

## 2025-08-19 RX ORDER — CEFUROXIME AXETIL 500 MG/1
500 TABLET ORAL 2 TIMES DAILY
Qty: 14 TABLET | Refills: 0 | Status: SHIPPED | OUTPATIENT
Start: 2025-08-19 | End: 2025-08-26

## 2025-08-19 ASSESSMENT — ENCOUNTER SYMPTOMS
CHILLS: 0
CONSTIPATION: 0
COUGH: 0
FEVER: 0
NAUSEA: 0
JOINT SWELLING: 0
ABDOMINAL PAIN: 0
DYSURIA: 0
DIZZINESS: 0
SHORTNESS OF BREATH: 0
LIGHT-HEADEDNESS: 0
DIFFICULTY URINATING: 0
COLOR CHANGE: 1
FREQUENCY: 0
DIARRHEA: 0
APPETITE CHANGE: 0
ACTIVITY CHANGE: 0
VOMITING: 0
WOUND: 1

## 2025-08-19 ASSESSMENT — PAIN SCALES - GENERAL: PAINLEVEL_OUTOF10: 0-NO PAIN

## 2025-08-29 LAB
ALBUMIN SERPL-MCNC: 4.5 G/DL (ref 3.6–5.1)
ALBUMIN SERPL-MCNC: 4.6 G/DL (ref 3.6–5.1)
ALBUMIN/CREAT UR: 5 MG/G CREAT
ALP SERPL-CCNC: 67 U/L (ref 35–144)
ALT SERPL-CCNC: 20 U/L (ref 9–46)
ANION GAP SERPL CALCULATED.4IONS-SCNC: 8 MMOL/L (CALC) (ref 7–17)
APPEARANCE UR: CLEAR
AST SERPL-CCNC: 22 U/L (ref 10–35)
BASOPHILS # BLD AUTO: 37 CELLS/UL (ref 0–200)
BASOPHILS NFR BLD AUTO: 0.5 %
BILIRUB SERPL-MCNC: 0.6 MG/DL (ref 0.2–1.2)
BILIRUB UR QL STRIP: NEGATIVE
BUN SERPL-MCNC: 22 MG/DL (ref 7–25)
BUN SERPL-MCNC: 22 MG/DL (ref 7–25)
BUN/CREAT SERPL: NORMAL (CALC) (ref 6–22)
CALCIUM SERPL-MCNC: 9.6 MG/DL (ref 8.6–10.3)
CALCIUM SERPL-MCNC: 9.7 MG/DL (ref 8.6–10.3)
CHLORIDE SERPL-SCNC: 105 MMOL/L (ref 98–110)
CHLORIDE SERPL-SCNC: 105 MMOL/L (ref 98–110)
CHOLEST SERPL-MCNC: 158 MG/DL
CHOLEST/HDLC SERPL: 3.8 (CALC)
CO2 SERPL-SCNC: 26 MMOL/L (ref 20–32)
CO2 SERPL-SCNC: 26 MMOL/L (ref 20–32)
COLOR UR: YELLOW
CREAT SERPL-MCNC: 1.26 MG/DL (ref 0.7–1.35)
CREAT SERPL-MCNC: 1.33 MG/DL (ref 0.7–1.35)
CREAT UR-MCNC: 104 MG/DL (ref 20–320)
EGFRCR SERPLBLD CKD-EPI 2021: 58 ML/MIN/1.73M2
EGFRCR SERPLBLD CKD-EPI 2021: 62 ML/MIN/1.73M2
EOSINOPHIL # BLD AUTO: 252 CELLS/UL (ref 15–500)
EOSINOPHIL NFR BLD AUTO: 3.4 %
ERYTHROCYTE [DISTWIDTH] IN BLOOD BY AUTOMATED COUNT: 13.7 % (ref 11–15)
ERYTHROCYTE [DISTWIDTH] IN BLOOD BY AUTOMATED COUNT: 13.9 % (ref 11–15)
GLUCOSE SERPL-MCNC: 91 MG/DL (ref 65–99)
GLUCOSE SERPL-MCNC: 92 MG/DL (ref 65–99)
GLUCOSE UR QL STRIP: NEGATIVE
HCT VFR BLD AUTO: 41.4 % (ref 38.5–50)
HCT VFR BLD AUTO: 42.6 % (ref 38.5–50)
HCV AB SERPL QL IA: NORMAL
HDLC SERPL-MCNC: 42 MG/DL
HGB BLD-MCNC: 13.6 G/DL (ref 13.2–17.1)
HGB BLD-MCNC: 13.8 G/DL (ref 13.2–17.1)
HGB UR QL STRIP: NEGATIVE
KETONES UR QL STRIP: NEGATIVE
LDLC SERPL CALC-MCNC: 92 MG/DL (CALC)
LEUKOCYTE ESTERASE UR QL STRIP: NEGATIVE
LYMPHOCYTES # BLD AUTO: 2116 CELLS/UL (ref 850–3900)
LYMPHOCYTES NFR BLD AUTO: 28.6 %
MCH RBC QN AUTO: 30.1 PG (ref 27–33)
MCH RBC QN AUTO: 30.6 PG (ref 27–33)
MCHC RBC AUTO-ENTMCNC: 32.4 G/DL (ref 32–36)
MCHC RBC AUTO-ENTMCNC: 32.9 G/DL (ref 32–36)
MCV RBC AUTO: 93 FL (ref 80–100)
MCV RBC AUTO: 93 FL (ref 80–100)
MICROALBUMIN UR-MCNC: 0.5 MG/DL
MONOCYTES # BLD AUTO: 777 CELLS/UL (ref 200–950)
MONOCYTES NFR BLD AUTO: 10.5 %
NEUTROPHILS # BLD AUTO: 4218 CELLS/UL (ref 1500–7800)
NEUTROPHILS NFR BLD AUTO: 57 %
NITRITE UR QL STRIP: NEGATIVE
NONHDLC SERPL-MCNC: 116 MG/DL (CALC)
PH UR STRIP: ABNORMAL [PH] (ref 5–8)
PHOSPHATE SERPL-MCNC: 3.5 MG/DL (ref 2.1–4.3)
PLATELET # BLD AUTO: 264 THOUSAND/UL (ref 140–400)
PLATELET # BLD AUTO: 268 THOUSAND/UL (ref 140–400)
PMV BLD REES-ECKER: 9 FL (ref 7.5–12.5)
PMV BLD REES-ECKER: 9 FL (ref 7.5–12.5)
POTASSIUM SERPL-SCNC: 4.4 MMOL/L (ref 3.5–5.3)
POTASSIUM SERPL-SCNC: 4.7 MMOL/L (ref 3.5–5.3)
PROT SERPL-MCNC: 6.7 G/DL (ref 6.1–8.1)
PROT UR QL STRIP: NEGATIVE
PSA SERPL-MCNC: 1.66 NG/ML
RBC # BLD AUTO: 4.45 MILLION/UL (ref 4.2–5.8)
RBC # BLD AUTO: 4.58 MILLION/UL (ref 4.2–5.8)
SODIUM SERPL-SCNC: 139 MMOL/L (ref 135–146)
SODIUM SERPL-SCNC: 139 MMOL/L (ref 135–146)
SP GR UR STRIP: 1.01 (ref 1–1.03)
TRIGL SERPL-MCNC: 138 MG/DL
TSH SERPL-ACNC: 1.07 MIU/L (ref 0.4–4.5)
WBC # BLD AUTO: 7.4 THOUSAND/UL (ref 3.8–10.8)
WBC # BLD AUTO: 7.4 THOUSAND/UL (ref 3.8–10.8)

## 2025-11-13 ENCOUNTER — APPOINTMENT (OUTPATIENT)
Dept: NEPHROLOGY | Facility: CLINIC | Age: 70
End: 2025-11-13
Payer: COMMERCIAL

## (undated) DEVICE — DIFFUSER, MAESTRO, CORE

## (undated) DEVICE — SUTURE, VICRYL, 3-0, 27 IN, CT-1, VIOLET

## (undated) DEVICE — SPONGE, LAP, XRAY DECT, 4IN X 18IN, W/MASTER DMT, STERILE

## (undated) DEVICE — APPLIER, MULTIPLE CLIP, LIGACLIP, W/20 MEDIUM, 11.5 APPLIER

## (undated) DEVICE — FLOSEAL, MATRIX, HEMOSTATIC, FULL STERILE PREP, 5ML

## (undated) DEVICE — SUTURE, VICRYL, 2-0, 27 IN, SH, UNDYED

## (undated) DEVICE — SUTURE, SILK, 2-0, 18 IN, BLACK

## (undated) DEVICE — TIP,  ELECTRODE COATED INSULATED, EXTENDED, LF

## (undated) DEVICE — GOWN, ASTOUND, XL

## (undated) DEVICE — SUTURE, VICRYL, 1, 36 IN, CTX, VIOLET

## (undated) DEVICE — COVER HANDLE LIGHT, STERIS, BLUE, STERILE

## (undated) DEVICE — SUTURE, MONOCRYL, 4-0, 18 IN, PS2, UNDYED

## (undated) DEVICE — PAD, GROUNDING, ELECTROSURGICAL, W/9 FT CABLE, POLYHESIVE II, ADULT, LF

## (undated) DEVICE — COVER, C-ARM W/CLIPS, OEC GE

## (undated) DEVICE — SUTURE, PDSII, 1, TP-1, VIL, MONO, 48LP

## (undated) DEVICE — GUIDEWIRE, SEXTANT, BLUNT

## (undated) DEVICE — DRAPE, INCISE, ANTIMICROBIAL, IOBAN 2, STERI DRAPE, 23 X 33 IN, DISPOSABLE, STERILE

## (undated) DEVICE — KIT, MINOR, DOUBLE BASIN

## (undated) DEVICE — DRAPE, INSTRUMENT, W/POUCH, STERI DRAPE, 7 X 11 IN, DISPOSABLE, STERILE

## (undated) DEVICE — KIT, PATIENT CARE, JACKSON TABLE W/PRONE-SAFE HEADREST

## (undated) DEVICE — TOWEL, SURGICAL, NEURO, O/R, 16 X 26, BLUE, STERILE

## (undated) DEVICE — DRESSING, ISLAND, ADHESIVE, TELFA, 4 X 8 IN

## (undated) DEVICE — SUTURE, ETHILON, 2-0, FSLX 30, BLACK

## (undated) DEVICE — Device

## (undated) DEVICE — TRAY, DRY PREP, PREMIUM

## (undated) DEVICE — GLOVE, SURGICAL, PROTEXIS PI MICRO, 7.5, PF, LF

## (undated) DEVICE — SUTURE, MONOCRYL, 3-0, 18 IN, PS2, UNDYED

## (undated) DEVICE — GLOVE, SURGEON, PREMIERPRO PI, ORTHO, SZ-8.5, PF, LT GRN

## (undated) DEVICE — ADHESIVE, SKIN, LIQUIBAND EXCEED

## (undated) DEVICE — APPLICATOR, CHLORAPREP, W/ORANGE TINT, 26ML

## (undated) DEVICE — GLOVE, SURGICAL, PROTEXIS PI ORTHO, 8.0, PF, LF

## (undated) DEVICE — GLOVE, SURGEON, PREMIERPRO PI, MICRO, SZ-8.0, PF, WH

## (undated) DEVICE — DRAPE COVER, C ARM, FLOUROSCAN IMAGING SYS

## (undated) DEVICE — SUTURE, PDS II, 2-0, 27 IN, SH, VIOLET

## (undated) DEVICE — SUTURE, VICRYL, 3-0, 27IN, RB-1

## (undated) DEVICE — SUTURE, SILK, 3-0, 18 IN, MULTIPACK, BLACK

## (undated) DEVICE — SUTURE, PDS II, 0, 27 IN, CT1, VIOLET

## (undated) DEVICE — GLOVE, SURGICAL, PROTEXIS PI MICRO, 7.0, PF, LF

## (undated) DEVICE — STAPLER, SKIN PROXIMATE, 35 WIDE

## (undated) DEVICE — ELECTRODE, ELECTROSURGICAL, BLADE, INSULATED, ENT/IMA, STERILE